# Patient Record
Sex: MALE | Race: WHITE | NOT HISPANIC OR LATINO | Employment: OTHER | URBAN - METROPOLITAN AREA
[De-identification: names, ages, dates, MRNs, and addresses within clinical notes are randomized per-mention and may not be internally consistent; named-entity substitution may affect disease eponyms.]

---

## 2023-03-02 LAB
ALANINE AMINOTRANSFERASE (SGPT) (U/L) IN SER/PLAS: 10 U/L (ref 10–52)
ALBUMIN (G/DL) IN SER/PLAS: 4.6 G/DL (ref 3.4–5)
ALKALINE PHOSPHATASE (U/L) IN SER/PLAS: 57 U/L (ref 33–136)
ANION GAP IN SER/PLAS: 14 MMOL/L (ref 10–20)
ASPARTATE AMINOTRANSFERASE (SGOT) (U/L) IN SER/PLAS: 11 U/L (ref 9–39)
BILIRUBIN TOTAL (MG/DL) IN SER/PLAS: 1.5 MG/DL (ref 0–1.2)
CALCIDIOL (25 OH VITAMIN D3) (NG/ML) IN SER/PLAS: 70 NG/ML
CALCIUM (MG/DL) IN SER/PLAS: 9 MG/DL (ref 8.6–10.3)
CARBON DIOXIDE, TOTAL (MMOL/L) IN SER/PLAS: 26 MMOL/L (ref 21–32)
CHLORIDE (MMOL/L) IN SER/PLAS: 103 MMOL/L (ref 98–107)
CHOLESTEROL (MG/DL) IN SER/PLAS: 63 MG/DL (ref 0–199)
CHOLESTEROL IN HDL (MG/DL) IN SER/PLAS: 32.3 MG/DL
CHOLESTEROL/HDL RATIO: 2
CREATININE (MG/DL) IN SER/PLAS: 1.58 MG/DL (ref 0.5–1.3)
CREATININE (MG/DL) IN URINE: 139 MG/DL (ref 20–370)
ERYTHROCYTE DISTRIBUTION WIDTH (RATIO) BY AUTOMATED COUNT: 21.6 % (ref 11.5–14.5)
ERYTHROCYTE MEAN CORPUSCULAR HEMOGLOBIN CONCENTRATION (G/DL) BY AUTOMATED: 32.1 G/DL (ref 32–36)
ERYTHROCYTE MEAN CORPUSCULAR VOLUME (FL) BY AUTOMATED COUNT: 114 FL (ref 80–100)
ERYTHROCYTES (10*6/UL) IN BLOOD BY AUTOMATED COUNT: 1.96 X10E12/L (ref 4.5–5.9)
ESTIMATED AVERAGE GLUCOSE FOR HBA1C: 134 MG/DL
GFR MALE: 44 ML/MIN/1.73M2
GLUCOSE (MG/DL) IN SER/PLAS: 135 MG/DL (ref 74–99)
HEMATOCRIT (%) IN BLOOD BY AUTOMATED COUNT: 22.4 % (ref 41–52)
HEMOGLOBIN (G/DL) IN BLOOD: 7.2 G/DL (ref 13.5–17.5)
HEMOGLOBIN A1C/HEMOGLOBIN TOTAL IN BLOOD: 6.3 %
LDL: 19 MG/DL (ref 0–99)
LEUKOCYTES (10*3/UL) IN BLOOD BY AUTOMATED COUNT: 4.8 X10E9/L (ref 4.4–11.3)
NRBC (PER 100 WBCS) BY AUTOMATED COUNT: 0.4 /100 WBC (ref 0–0)
PARATHYRIN INTACT (PG/ML) IN SER/PLAS: 48.1 PG/ML (ref 18.5–88)
PHOSPHATE (MG/DL) IN SER/PLAS: 3.9 MG/DL (ref 2.5–4.9)
PLATELETS (10*3/UL) IN BLOOD AUTOMATED COUNT: 183 X10E9/L (ref 150–450)
POTASSIUM (MMOL/L) IN SER/PLAS: 4.1 MMOL/L (ref 3.5–5.3)
PROTEIN (MG/DL) IN URINE: 46 MG/DL (ref 5–25)
PROTEIN TOTAL: 6.8 G/DL (ref 6.4–8.2)
PROTEIN/CREATININE (MG/MG) IN URINE: 0.33 MG/MG CREAT (ref 0–0.17)
SODIUM (MMOL/L) IN SER/PLAS: 139 MMOL/L (ref 136–145)
TRIGLYCERIDE (MG/DL) IN SER/PLAS: 60 MG/DL (ref 0–149)
UREA NITROGEN (MG/DL) IN SER/PLAS: 27 MG/DL (ref 6–23)
VLDL: 12 MG/DL (ref 0–40)

## 2023-03-03 LAB
ABO GROUP (TYPE) IN BLOOD: NORMAL
ABO GROUP (TYPE) IN BLOOD: NORMAL
ANTIBODY SCREEN: NORMAL
RH FACTOR: NORMAL
RH FACTOR: NORMAL

## 2023-05-15 LAB
ABO GROUP (TYPE) IN BLOOD: NORMAL
ANTIBODY SCREEN: NORMAL
RH FACTOR: NORMAL

## 2023-05-16 LAB
BB ANTIBODY IDENTIFICATION: NORMAL
PATH REV-IMMUNOHEMATOLOGY-PR30: NORMAL

## 2023-06-06 LAB — PROSTATE SPECIFIC AG (NG/ML) IN SER/PLAS: 1.21 NG/ML (ref 0–4)

## 2023-06-16 LAB
ABO GROUP (TYPE) IN BLOOD: NORMAL
ANTIBODY SCREEN: NORMAL
RH FACTOR: NORMAL

## 2023-06-19 LAB
BB ANTIBODY IDENTIFICATION: NORMAL
PATH REV-IMMUNOHEMATOLOGY-PR30: NORMAL

## 2023-06-30 LAB
ANION GAP IN SER/PLAS: 14 MMOL/L (ref 10–20)
CALCIDIOL (25 OH VITAMIN D3) (NG/ML) IN SER/PLAS: 54 NG/ML
CALCIUM (MG/DL) IN SER/PLAS: 9.2 MG/DL (ref 8.6–10.3)
CARBON DIOXIDE, TOTAL (MMOL/L) IN SER/PLAS: 25 MMOL/L (ref 21–32)
CHLORIDE (MMOL/L) IN SER/PLAS: 105 MMOL/L (ref 98–107)
CREATININE (MG/DL) IN SER/PLAS: 1.59 MG/DL (ref 0.5–1.3)
CREATININE (MG/DL) IN URINE: 115 MG/DL (ref 20–370)
GFR MALE: 44 ML/MIN/1.73M2
GLUCOSE (MG/DL) IN SER/PLAS: 134 MG/DL (ref 74–99)
PARATHYRIN INTACT (PG/ML) IN SER/PLAS: 35.8 PG/ML (ref 18.5–88)
PHOSPHATE (MG/DL) IN SER/PLAS: 4 MG/DL (ref 2.5–4.9)
POTASSIUM (MMOL/L) IN SER/PLAS: 4.1 MMOL/L (ref 3.5–5.3)
PROTEIN (MG/DL) IN URINE: 66 MG/DL (ref 5–25)
PROTEIN/CREATININE (MG/MG) IN URINE: 0.57 MG/MG CREAT (ref 0–0.17)
SODIUM (MMOL/L) IN SER/PLAS: 140 MMOL/L (ref 136–145)
UREA NITROGEN (MG/DL) IN SER/PLAS: 30 MG/DL (ref 6–23)

## 2023-07-07 LAB
COBALAMIN (VITAMIN B12) (PG/ML) IN SER/PLAS: 1786 PG/ML (ref 211–911)
ESTIMATED AVERAGE GLUCOSE FOR HBA1C: 137 MG/DL
HEMOGLOBIN A1C/HEMOGLOBIN TOTAL IN BLOOD: 6.4 %
THYROTROPIN (MIU/L) IN SER/PLAS BY DETECTION LIMIT <= 0.05 MIU/L: 2.78 MIU/L (ref 0.44–3.98)

## 2023-08-30 PROBLEM — N40.0 BENIGN PROSTATIC HYPERPLASIA: Status: ACTIVE | Noted: 2023-08-30

## 2023-08-30 PROBLEM — R09.89 CAROTID BRUIT: Status: ACTIVE | Noted: 2023-08-30

## 2023-08-30 PROBLEM — Z79.4 DIABETES MELLITUS TREATED WITH INSULIN AND ORAL MEDICATION (MULTI): Status: ACTIVE | Noted: 2023-08-30

## 2023-08-30 PROBLEM — E83.10 IRON DISORDER: Status: ACTIVE | Noted: 2023-08-30

## 2023-08-30 PROBLEM — I25.10 CORONARY ARTERY DISEASE INVOLVING NATIVE CORONARY ARTERY OF NATIVE HEART: Status: ACTIVE | Noted: 2023-08-30

## 2023-08-30 PROBLEM — I65.23 STENOSIS OF BOTH EXTERNAL CAROTID ARTERIES: Status: ACTIVE | Noted: 2023-08-30

## 2023-08-30 PROBLEM — K21.9 GERD (GASTROESOPHAGEAL REFLUX DISEASE): Status: ACTIVE | Noted: 2023-08-30

## 2023-08-30 PROBLEM — Z95.1 S/P CABG X 5: Status: ACTIVE | Noted: 2023-08-30

## 2023-08-30 PROBLEM — I20.9 ANGINA, CLASS II (CMS-HCC): Status: ACTIVE | Noted: 2023-08-30

## 2023-08-30 PROBLEM — C44.320 SQUAMOUS CELL CARCINOMA OF SKIN OF FACE: Status: ACTIVE | Noted: 2023-08-30

## 2023-08-30 PROBLEM — R94.30 ABNORMAL RESULTS OF CARDIOVASCULAR FUNCTION STUDIES: Status: ACTIVE | Noted: 2023-08-30

## 2023-08-30 PROBLEM — K76.0 FATTY LIVER: Status: ACTIVE | Noted: 2023-08-30

## 2023-08-30 PROBLEM — I10 ESSENTIAL HYPERTENSION: Status: ACTIVE | Noted: 2023-08-30

## 2023-08-30 PROBLEM — D64.9 CHRONIC ANEMIA: Status: ACTIVE | Noted: 2023-08-30

## 2023-08-30 PROBLEM — E66.3 OVERWEIGHT WITH BODY MASS INDEX (BMI) OF 27 TO 27.9 IN ADULT: Status: ACTIVE | Noted: 2023-08-30

## 2023-08-30 PROBLEM — I73.9 PVD (PERIPHERAL VASCULAR DISEASE) (CMS-HCC): Status: ACTIVE | Noted: 2023-08-30

## 2023-08-30 PROBLEM — E78.2 MIXED HYPERLIPIDEMIA: Status: ACTIVE | Noted: 2023-08-30

## 2023-08-30 PROBLEM — N18.30 CKD (CHRONIC KIDNEY DISEASE), STAGE III (MULTI): Status: ACTIVE | Noted: 2023-08-30

## 2023-08-30 PROBLEM — E55.9 VITAMIN D DEFICIENCY: Status: ACTIVE | Noted: 2023-08-30

## 2023-08-30 PROBLEM — Z87.891 FORMER SMOKER: Status: ACTIVE | Noted: 2023-08-30

## 2023-08-30 PROBLEM — E11.9 DIABETES MELLITUS TREATED WITH INSULIN AND ORAL MEDICATION (MULTI): Status: ACTIVE | Noted: 2023-08-30

## 2023-08-30 PROBLEM — Z79.84 DIABETES MELLITUS TREATED WITH INSULIN AND ORAL MEDICATION (MULTI): Status: ACTIVE | Noted: 2023-08-30

## 2023-08-30 RX ORDER — ASPIRIN 81 MG/1
1 TABLET ORAL DAILY
COMMUNITY

## 2023-08-30 RX ORDER — TAMSULOSIN HYDROCHLORIDE 0.4 MG/1
1 CAPSULE ORAL DAILY
COMMUNITY

## 2023-08-30 RX ORDER — METFORMIN HYDROCHLORIDE 1000 MG/1
1000 TABLET ORAL 2 TIMES DAILY
COMMUNITY
End: 2023-11-10 | Stop reason: ALTCHOICE

## 2023-08-30 RX ORDER — LUSPATERCEPT 25 MG/1
INJECTION, POWDER, LYOPHILIZED, FOR SOLUTION SUBCUTANEOUS
COMMUNITY
End: 2024-01-25 | Stop reason: WASHOUT

## 2023-08-30 RX ORDER — CYANOCOBALAMIN (VITAMIN B-12) 500 MCG
1 TABLET ORAL NIGHTLY
COMMUNITY

## 2023-08-30 RX ORDER — LANOLIN ALCOHOL/MO/W.PET/CERES
2 CREAM (GRAM) TOPICAL DAILY
COMMUNITY

## 2023-08-30 RX ORDER — GLIMEPIRIDE 4 MG/1
4 TABLET ORAL 2 TIMES DAILY
COMMUNITY
End: 2023-11-30 | Stop reason: SDUPTHER

## 2023-08-30 RX ORDER — ISOSORBIDE MONONITRATE 30 MG/1
1 TABLET, EXTENDED RELEASE ORAL DAILY
COMMUNITY
End: 2023-10-17 | Stop reason: SDUPTHER

## 2023-08-30 RX ORDER — NITROGLYCERIN 0.4 MG/1
0.4 TABLET SUBLINGUAL EVERY 5 MIN PRN
COMMUNITY
End: 2023-11-10 | Stop reason: SDUPTHER

## 2023-08-30 RX ORDER — DAPAGLIFLOZIN 10 MG/1
1 TABLET, FILM COATED ORAL EVERY MORNING
COMMUNITY
Start: 2022-09-28 | End: 2023-10-03

## 2023-08-30 RX ORDER — SIMVASTATIN 20 MG/1
20 TABLET, FILM COATED ORAL NIGHTLY
COMMUNITY
End: 2023-10-17 | Stop reason: SDUPTHER

## 2023-08-30 RX ORDER — PEN NEEDLE, DIABETIC 29 G X1/2"
1 NEEDLE, DISPOSABLE MISCELLANEOUS DAILY
COMMUNITY
End: 2023-10-10 | Stop reason: ALTCHOICE

## 2023-08-30 RX ORDER — DILTIAZEM HYDROCHLORIDE 240 MG/1
1 CAPSULE, EXTENDED RELEASE ORAL DAILY
COMMUNITY
End: 2023-10-10 | Stop reason: ALTCHOICE

## 2023-08-30 RX ORDER — INSULIN DETEMIR 100 [IU]/ML
15 INJECTION, SOLUTION SUBCUTANEOUS NIGHTLY
COMMUNITY
End: 2024-02-29 | Stop reason: SDUPTHER

## 2023-08-30 RX ORDER — HYDROCHLOROTHIAZIDE 25 MG/1
1 TABLET ORAL DAILY
COMMUNITY
End: 2023-10-27

## 2023-08-30 RX ORDER — ERGOCALCIFEROL 1.25 MG/1
1 CAPSULE ORAL
COMMUNITY
Start: 2022-09-14 | End: 2024-03-27 | Stop reason: WASHOUT

## 2023-08-30 RX ORDER — PANTOPRAZOLE SODIUM 40 MG/1
1 TABLET, DELAYED RELEASE ORAL DAILY
COMMUNITY
End: 2024-01-30 | Stop reason: SDUPTHER

## 2023-08-30 RX ORDER — CLOPIDOGREL BISULFATE 75 MG/1
1 TABLET ORAL DAILY
COMMUNITY
Start: 2022-05-12 | End: 2023-11-30 | Stop reason: SDUPTHER

## 2023-08-30 RX ORDER — LISINOPRIL 30 MG/1
1 TABLET ORAL DAILY
COMMUNITY
End: 2023-11-30 | Stop reason: SDUPTHER

## 2023-09-21 VITALS — HEIGHT: 66 IN | BODY MASS INDEX: 27.32 KG/M2 | WEIGHT: 169.97 LBS

## 2023-09-21 DIAGNOSIS — D46.Z MDS (MYELODYSPLASTIC SYNDROME), LOW GRADE (MULTI): Primary | ICD-10-CM

## 2023-09-21 DIAGNOSIS — D46.1 REFRACTORY ANEMIA WITH RING SIDEROBLASTS (MULTI): ICD-10-CM

## 2023-09-21 RX ORDER — FAMOTIDINE 10 MG/ML
20 INJECTION INTRAVENOUS ONCE AS NEEDED
Status: CANCELLED | OUTPATIENT
Start: 2023-10-02

## 2023-09-21 RX ORDER — HEPARIN SODIUM,PORCINE/PF 10 UNIT/ML
50 SYRINGE (ML) INTRAVENOUS AS NEEDED
Status: CANCELLED | OUTPATIENT
Start: 2023-09-30

## 2023-09-21 RX ORDER — HEPARIN 100 UNIT/ML
500 SYRINGE INTRAVENOUS AS NEEDED
Status: CANCELLED | OUTPATIENT
Start: 2023-09-30

## 2023-09-21 RX ORDER — DIPHENHYDRAMINE HYDROCHLORIDE 50 MG/ML
50 INJECTION INTRAMUSCULAR; INTRAVENOUS AS NEEDED
Status: CANCELLED | OUTPATIENT
Start: 2023-10-02

## 2023-09-21 RX ORDER — EPINEPHRINE 0.3 MG/.3ML
0.3 INJECTION SUBCUTANEOUS EVERY 5 MIN PRN
Status: CANCELLED | OUTPATIENT
Start: 2023-10-02

## 2023-09-21 RX ORDER — ALBUTEROL SULFATE 0.83 MG/ML
3 SOLUTION RESPIRATORY (INHALATION) AS NEEDED
Status: CANCELLED | OUTPATIENT
Start: 2023-10-02

## 2023-09-24 DIAGNOSIS — D46.1 REFRACTORY ANEMIA WITH RING SIDEROBLASTS (MULTI): ICD-10-CM

## 2023-09-24 DIAGNOSIS — D46.Z MDS (MYELODYSPLASTIC SYNDROME), LOW GRADE (MULTI): Primary | ICD-10-CM

## 2023-10-02 ENCOUNTER — OFFICE VISIT (OUTPATIENT)
Dept: HEMATOLOGY/ONCOLOGY | Facility: CLINIC | Age: 78
End: 2023-10-02
Payer: MEDICARE

## 2023-10-02 ENCOUNTER — LAB (OUTPATIENT)
Dept: LAB | Facility: CLINIC | Age: 78
End: 2023-10-02
Payer: MEDICARE

## 2023-10-02 ENCOUNTER — INFUSION (OUTPATIENT)
Dept: HEMATOLOGY/ONCOLOGY | Facility: CLINIC | Age: 78
End: 2023-10-02
Payer: MEDICARE

## 2023-10-02 VITALS
SYSTOLIC BLOOD PRESSURE: 123 MMHG | RESPIRATION RATE: 18 BRPM | TEMPERATURE: 97.3 F | WEIGHT: 168.43 LBS | OXYGEN SATURATION: 96 % | HEIGHT: 66 IN | DIASTOLIC BLOOD PRESSURE: 57 MMHG | BODY MASS INDEX: 27.07 KG/M2 | HEART RATE: 86 BPM

## 2023-10-02 DIAGNOSIS — Z79.4 DIABETES MELLITUS TREATED WITH INSULIN AND ORAL MEDICATION (MULTI): ICD-10-CM

## 2023-10-02 DIAGNOSIS — E83.110 HEREDITARY HEMOCHROMATOSIS (CMS-HCC): ICD-10-CM

## 2023-10-02 DIAGNOSIS — D46.1 REFRACTORY ANEMIA WITH RING SIDEROBLASTS (MULTI): ICD-10-CM

## 2023-10-02 DIAGNOSIS — I10 ESSENTIAL HYPERTENSION: ICD-10-CM

## 2023-10-02 DIAGNOSIS — D46.Z MDS (MYELODYSPLASTIC SYNDROME), LOW GRADE (MULTI): ICD-10-CM

## 2023-10-02 DIAGNOSIS — Z79.84 DIABETES MELLITUS TREATED WITH INSULIN AND ORAL MEDICATION (MULTI): ICD-10-CM

## 2023-10-02 DIAGNOSIS — I20.9 ANGINA, CLASS II (CMS-HCC): ICD-10-CM

## 2023-10-02 DIAGNOSIS — E78.2 MIXED HYPERLIPIDEMIA: ICD-10-CM

## 2023-10-02 DIAGNOSIS — D46.Z MDS (MYELODYSPLASTIC SYNDROME), LOW GRADE (MULTI): Primary | ICD-10-CM

## 2023-10-02 DIAGNOSIS — I73.9 PVD (PERIPHERAL VASCULAR DISEASE) (CMS-HCC): ICD-10-CM

## 2023-10-02 DIAGNOSIS — E11.9 DIABETES MELLITUS TREATED WITH INSULIN AND ORAL MEDICATION (MULTI): ICD-10-CM

## 2023-10-02 DIAGNOSIS — N18.31 STAGE 3A CHRONIC KIDNEY DISEASE (MULTI): ICD-10-CM

## 2023-10-02 LAB
ALBUMIN SERPL BCP-MCNC: 4.6 G/DL (ref 3.4–5)
ALP SERPL-CCNC: 74 U/L (ref 33–136)
ALT SERPL W P-5'-P-CCNC: 10 U/L (ref 10–52)
ANION GAP SERPL CALC-SCNC: 17 MMOL/L (ref 10–20)
AST SERPL W P-5'-P-CCNC: 11 U/L (ref 9–39)
BASOPHILS # BLD AUTO: 0.06 X10*3/UL (ref 0–0.1)
BASOPHILS NFR BLD AUTO: 1.1 %
BILIRUB SERPL-MCNC: 1.4 MG/DL (ref 0–1.2)
BUN SERPL-MCNC: 28 MG/DL (ref 6–23)
CALCIUM SERPL-MCNC: 9.5 MG/DL (ref 8.6–10.3)
CHLORIDE SERPL-SCNC: 102 MMOL/L (ref 98–107)
CO2 SERPL-SCNC: 24 MMOL/L (ref 21–32)
CREAT SERPL-MCNC: 1.6 MG/DL (ref 0.5–1.3)
DACRYOCYTES BLD QL SMEAR: NORMAL
EOSINOPHIL # BLD AUTO: 0.51 X10*3/UL (ref 0–0.4)
EOSINOPHIL NFR BLD AUTO: 9.5 %
ERYTHROCYTE [DISTWIDTH] IN BLOOD BY AUTOMATED COUNT: 21.5 % (ref 11.5–14.5)
GFR SERPL CREATININE-BSD FRML MDRD: 44 ML/MIN/1.73M*2
GLUCOSE SERPL-MCNC: 184 MG/DL (ref 74–99)
HCT VFR BLD AUTO: 25.7 % (ref 41–52)
HGB BLD-MCNC: 8.6 G/DL (ref 13.5–17.5)
IMM GRANULOCYTES # BLD AUTO: 0.02 X10*3/UL (ref 0–0.5)
IMM GRANULOCYTES NFR BLD AUTO: 0.4 % (ref 0–0.9)
LYMPHOCYTES # BLD AUTO: 1.74 X10*3/UL (ref 0.8–3)
LYMPHOCYTES NFR BLD AUTO: 32.3 %
MCH RBC QN AUTO: 37.6 PG (ref 26–34)
MCHC RBC AUTO-ENTMCNC: 33.5 G/DL (ref 32–36)
MCV RBC AUTO: 112 FL (ref 80–100)
MONOCYTES # BLD AUTO: 0.42 X10*3/UL (ref 0.05–0.8)
MONOCYTES NFR BLD AUTO: 7.8 %
NEUTROPHILS # BLD AUTO: 2.64 X10*3/UL (ref 1.6–5.5)
NEUTROPHILS NFR BLD AUTO: 48.9 %
NRBC BLD-RTO: ABNORMAL /100{WBCS}
OVALOCYTES BLD QL SMEAR: NORMAL
PLATELET # BLD AUTO: 218 X10*3/UL (ref 150–450)
PMV BLD AUTO: 14.5 FL (ref 7.5–11.5)
POLYCHROMASIA BLD QL SMEAR: NORMAL
POTASSIUM SERPL-SCNC: 4.1 MMOL/L (ref 3.5–5.3)
PROT SERPL-MCNC: 6.9 G/DL (ref 6.4–8.2)
RBC # BLD AUTO: 2.29 X10*6/UL (ref 4.5–5.9)
RBC MORPH BLD: NORMAL
SCHISTOCYTES BLD QL SMEAR: NORMAL
SODIUM SERPL-SCNC: 139 MMOL/L (ref 136–145)
WBC # BLD AUTO: 5.4 X10*3/UL (ref 4.4–11.3)

## 2023-10-02 PROCEDURE — 1159F MED LIST DOCD IN RCRD: CPT | Performed by: INTERNAL MEDICINE

## 2023-10-02 PROCEDURE — 1125F AMNT PAIN NOTED PAIN PRSNT: CPT | Performed by: INTERNAL MEDICINE

## 2023-10-02 PROCEDURE — 99214 OFFICE O/P EST MOD 30 MIN: CPT | Performed by: INTERNAL MEDICINE

## 2023-10-02 PROCEDURE — 3078F DIAST BP <80 MM HG: CPT | Performed by: INTERNAL MEDICINE

## 2023-10-02 PROCEDURE — 1126F AMNT PAIN NOTED NONE PRSNT: CPT | Performed by: INTERNAL MEDICINE

## 2023-10-02 PROCEDURE — 85025 COMPLETE CBC W/AUTO DIFF WBC: CPT

## 2023-10-02 PROCEDURE — 2500000004 HC RX 250 GENERAL PHARMACY W/ HCPCS (ALT 636 FOR OP/ED): Mod: JW | Performed by: INTERNAL MEDICINE

## 2023-10-02 PROCEDURE — 80053 COMPREHEN METABOLIC PANEL: CPT

## 2023-10-02 PROCEDURE — 36415 COLL VENOUS BLD VENIPUNCTURE: CPT

## 2023-10-02 PROCEDURE — 85060 BLOOD SMEAR INTERPRETATION: CPT | Performed by: INTERNAL MEDICINE

## 2023-10-02 PROCEDURE — 3074F SYST BP LT 130 MM HG: CPT | Performed by: INTERNAL MEDICINE

## 2023-10-02 PROCEDURE — 1036F TOBACCO NON-USER: CPT | Performed by: INTERNAL MEDICINE

## 2023-10-02 PROCEDURE — 1160F RVW MEDS BY RX/DR IN RCRD: CPT | Performed by: INTERNAL MEDICINE

## 2023-10-02 RX ORDER — EPINEPHRINE 0.3 MG/.3ML
0.3 INJECTION SUBCUTANEOUS EVERY 5 MIN PRN
Status: CANCELLED | OUTPATIENT
Start: 2023-10-23

## 2023-10-02 RX ORDER — ALBUTEROL SULFATE 0.83 MG/ML
3 SOLUTION RESPIRATORY (INHALATION) AS NEEDED
Status: CANCELLED | OUTPATIENT
Start: 2023-10-23

## 2023-10-02 RX ORDER — DIPHENHYDRAMINE HYDROCHLORIDE 50 MG/ML
50 INJECTION INTRAMUSCULAR; INTRAVENOUS AS NEEDED
Status: DISCONTINUED | OUTPATIENT
Start: 2023-10-02 | End: 2023-10-02 | Stop reason: HOSPADM

## 2023-10-02 RX ORDER — FAMOTIDINE 10 MG/ML
20 INJECTION INTRAVENOUS ONCE AS NEEDED
Status: DISCONTINUED | OUTPATIENT
Start: 2023-10-02 | End: 2023-10-02 | Stop reason: HOSPADM

## 2023-10-02 RX ORDER — DIPHENHYDRAMINE HYDROCHLORIDE 50 MG/ML
50 INJECTION INTRAMUSCULAR; INTRAVENOUS AS NEEDED
Status: CANCELLED | OUTPATIENT
Start: 2023-10-23

## 2023-10-02 RX ORDER — EPINEPHRINE 0.3 MG/.3ML
0.3 INJECTION SUBCUTANEOUS EVERY 5 MIN PRN
Status: DISCONTINUED | OUTPATIENT
Start: 2023-10-02 | End: 2023-10-02 | Stop reason: HOSPADM

## 2023-10-02 RX ORDER — FAMOTIDINE 10 MG/ML
20 INJECTION INTRAVENOUS ONCE AS NEEDED
Status: CANCELLED | OUTPATIENT
Start: 2023-10-23

## 2023-10-02 RX ORDER — ALBUTEROL SULFATE 0.83 MG/ML
3 SOLUTION RESPIRATORY (INHALATION) AS NEEDED
Status: DISCONTINUED | OUTPATIENT
Start: 2023-10-02 | End: 2023-10-02 | Stop reason: HOSPADM

## 2023-10-02 RX ADMIN — LUSPATERCEPT 102.5 MG: 25 INJECTION, POWDER, LYOPHILIZED, FOR SOLUTION SUBCUTANEOUS at 15:10

## 2023-10-02 ASSESSMENT — PROMIS GLOBAL HEALTH SCALE
IN THE PAST 7 DAYS, HOW WOULD YOU RATE YOUR FATIGUE ON AVERAGE [ON A SCALE FROM 1 (NONE) TO 5 (VERY SEVERE)]?: MODERATE
IN GENERAL, PLEASE RATE HOW WELL YOU CARRY OUT YOUR USUAL SOCIAL ACTIVITIES (INCLUDES ACTIVITIES AT HOME, AT WORK, AND IN YOUR COMMUNITY, AND RESPONSIBILITIES AS A PARENT, CHILD, SPOUSE, EMPLOYEE, FRIEND, ETC) [ON A SCALE OF 1 (POOR) TO 5 (EXCELLENT)]?: FAIR
IN THE PAST 7 DAYS, HOW OFTEN HAVE YOU BEEN BOTHERED BY EMOTIONAL PROBLEMS, SUCH AS FEELING ANXIOUS, DEPRESSED, OR IRRITABLE [ON A SCALE FROM 1 (NEVER) TO 5 (ALWAYS)]?: RARELY
IN GENERAL, HOW WOULD YOU RATE YOUR SATISFACTION WITH YOUR SOCIAL ACTIVITIES AND RELATIONSHIPS [ON A SCALE OF 1 (POOR) TO 5 (EXCELLENT)]?: GOOD
TO WHAT EXTENT ARE YOU ABLE TO CARRY OUT YOUR EVERYDAY PHYSICAL ACTIVITIES SUCH AS WALKING, CLIMBING STAIRS, CARRYING GROCERIES, OR MOVING A CHAIR [ON A SCALE OF 1 (NOT AT ALL) TO 5 (COMPLETELY)]?: MODERATELY
IN GENERAL, WOULD YOU SAY YOUR QUALITY OF LIFE IS...[ON A SCALE OF 1 (POOR) TO 5 (EXCELLENT)]: VERY GOOD
IN GENERAL, HOW WOULD YOU RATE YOUR PHYSICAL HEALTH [ON A SCALE OF 1 (POOR) TO 5 (EXCELLENT)]?: FAIR
IN THE PAST 7 DAYS, HOW WOULD YOU RATE YOUR PAIN ON AVERAGE [ON A SCALE FROM 0 (NO PAIN) TO 10 (WORST IMAGINABLE PAIN)]?: 2
IN GENERAL, HOW WOULD YOU RATE YOUR MENTAL HEALTH, INCLUDING YOUR MOOD AND YOUR ABILITY TO THINK [ON A SCALE OF 1 (POOR) TO 5 (EXCELLENT)]?: EXCELLENT

## 2023-10-02 ASSESSMENT — ENCOUNTER SYMPTOMS
LOSS OF SENSATION IN FEET: 0
DEPRESSION: 0
OCCASIONAL FEELINGS OF UNSTEADINESS: 0

## 2023-10-02 ASSESSMENT — PAIN SCALES - GENERAL: PAINLEVEL: 0-NO PAIN

## 2023-10-02 NOTE — PROGRESS NOTES
"Patient ID: Chas Melgar is a 78 y.o. male.    Subjective    HPI How are my blood counts?    Here for next dose of luspatercept.  Says his exercise tolerance over the last couple weeks has gotten a bit better.        Objective    BSA: 1.88 meters squared  /57   Pulse 86   Temp 36.3 °C (97.3 °F)   Resp 18   Ht 1.667 m (5' 5.63\")   Wt 76.4 kg (168 lb 6.9 oz)   SpO2 96%   BMI 27.49 kg/m²      Physical Exam  Vitals reviewed.   Constitutional:       Appearance: Normal appearance.   HENT:      Head: Normocephalic.   Eyes:      Extraocular Movements: Extraocular movements intact.      Pupils: Pupils are equal, round, and reactive to light.   Cardiovascular:      Rate and Rhythm: Normal rate and regular rhythm.      Heart sounds: Normal heart sounds.   Abdominal:      General: Bowel sounds are normal.      Palpations: Abdomen is soft.   Musculoskeletal:         General: Normal range of motion.      Cervical back: Normal range of motion and neck supple.   Skin:     General: Skin is warm.   Neurological:      General: No focal deficit present.      Mental Status: He is alert and oriented to person, place, and time.   Psychiatric:         Mood and Affect: Mood normal.     Constitutional: positive for fatigue   Eyes: negative  Ears, nose, mouth, throat, and face: negative  Respiratory: negative  Cardiovascular: negative  Integument/breasts: negative  Gastrointestinal: negative  Genitourinary:negative  Musculoskeletal: legs feel weak  Neurological: negative  Behavioral/Psych: negative  Hematologic/lymphatic: negative  Endocrine: negative  Allergic/Immunologic: negative    Performance Status:  Symptomatic; fully ambulatory      Assessment/Plan    1) MDS/RARS  -has symptomatic anemia  -failed aranesp  -currently on luspatercept Q3 weeks  -currently being dose at 1.33 mg/kg  -reports exercise tolerance a bit better over the last couple weeks  -labs to be done today include CBC, COMP  -results reviewed--wbc 5.4, " hgb 8.6, plt 218,000, creatinine 1.6  -benefits, risks, potential morbidity related to luspatercept were reviewed with Chas and he provided informed consent to prceed  -he went on to receive luspatercept 1.33 mg/kg subcutaneous  -he will return in 3 weeks for next dose    2) hereditary hemochromatosis  -had been on therapeutic phlebotomy in the past, however, given current marked anemia, it has not been wise to continue with phlebotomy    3) hyperlipidemia  -on simvastatin    4) hypertension  -on hydrochlorothiazide  -on lisinopril  -on Cartia XT    5) diabetes  -on glimepiride  -on metformin  -on levemir    6) PAD  -on ASA  -s/p multiple stents    Cancer Staging   No matching staging information was found for the patient.    Oncology History    No history exists.        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

## 2023-10-03 LAB — PATH REVIEW-CBC DIFFERENTIAL: NORMAL

## 2023-10-10 RX ORDER — PEN NEEDLE, DIABETIC 31 GX5/16"
NEEDLE, DISPOSABLE MISCELLANEOUS
COMMUNITY
Start: 2023-08-31 | End: 2023-12-20 | Stop reason: SDUPTHER

## 2023-10-10 RX ORDER — DILTIAZEM HYDROCHLORIDE 240 MG/1
240 CAPSULE, COATED, EXTENDED RELEASE ORAL DAILY
COMMUNITY
Start: 2023-09-16 | End: 2023-12-21

## 2023-10-17 ENCOUNTER — DOCUMENTATION (OUTPATIENT)
Dept: CARDIOLOGY | Facility: CLINIC | Age: 78
End: 2023-10-17
Payer: MEDICARE

## 2023-10-17 DIAGNOSIS — I25.10 CORONARY ARTERY DISEASE INVOLVING NATIVE CORONARY ARTERY OF NATIVE HEART, UNSPECIFIED WHETHER ANGINA PRESENT: ICD-10-CM

## 2023-10-17 DIAGNOSIS — Z95.1 S/P CABG X 5: ICD-10-CM

## 2023-10-17 DIAGNOSIS — E78.2 MIXED HYPERLIPIDEMIA: ICD-10-CM

## 2023-10-17 DIAGNOSIS — I20.9 ANGINA, CLASS II (CMS-HCC): ICD-10-CM

## 2023-10-17 NOTE — PROGRESS NOTES
10/17/23  Recvd vm from [facility] asking for refills of pt's Imdur 30mg every day, and Simvastatin 20mg every day, from Dr. Mcmullen.  This pt. Follows with Dr. Goodwin.  Sent pending auth.  Alfonso

## 2023-10-18 RX ORDER — SIMVASTATIN 20 MG/1
20 TABLET, FILM COATED ORAL NIGHTLY
Qty: 90 TABLET | Refills: 3 | Status: SHIPPED | OUTPATIENT
Start: 2023-10-18 | End: 2024-10-17

## 2023-10-18 RX ORDER — ISOSORBIDE MONONITRATE 30 MG/1
30 TABLET, EXTENDED RELEASE ORAL DAILY
Qty: 90 TABLET | Refills: 3 | Status: SHIPPED | OUTPATIENT
Start: 2023-10-18 | End: 2024-10-17

## 2023-10-23 ENCOUNTER — APPOINTMENT (OUTPATIENT)
Dept: LAB | Facility: CLINIC | Age: 78
End: 2023-10-23
Payer: MEDICARE

## 2023-10-23 ENCOUNTER — INFUSION (OUTPATIENT)
Dept: HEMATOLOGY/ONCOLOGY | Facility: CLINIC | Age: 78
End: 2023-10-23
Payer: MEDICARE

## 2023-10-23 VITALS
DIASTOLIC BLOOD PRESSURE: 68 MMHG | OXYGEN SATURATION: 99 % | TEMPERATURE: 97.3 F | WEIGHT: 169.31 LBS | BODY MASS INDEX: 27.64 KG/M2 | SYSTOLIC BLOOD PRESSURE: 142 MMHG | RESPIRATION RATE: 20 BRPM

## 2023-10-23 DIAGNOSIS — D46.1 REFRACTORY ANEMIA WITH RING SIDEROBLASTS (MULTI): ICD-10-CM

## 2023-10-23 DIAGNOSIS — D46.Z MDS (MYELODYSPLASTIC SYNDROME), LOW GRADE (MULTI): ICD-10-CM

## 2023-10-23 LAB
ALBUMIN SERPL BCP-MCNC: 4.3 G/DL (ref 3.4–5)
ALP SERPL-CCNC: 78 U/L (ref 33–136)
ALT SERPL W P-5'-P-CCNC: 10 U/L (ref 10–52)
ANION GAP SERPL CALC-SCNC: 17 MMOL/L (ref 10–20)
AST SERPL W P-5'-P-CCNC: 9 U/L (ref 9–39)
BASO STIPL BLD QL SMEAR: PRESENT
BASOPHILS # BLD AUTO: 0.06 X10*3/UL (ref 0–0.1)
BASOPHILS NFR BLD AUTO: 1.2 %
BILIRUB SERPL-MCNC: 1.3 MG/DL (ref 0–1.2)
BUN SERPL-MCNC: 32 MG/DL (ref 6–23)
CALCIUM SERPL-MCNC: 9.3 MG/DL (ref 8.6–10.3)
CHLORIDE SERPL-SCNC: 103 MMOL/L (ref 98–107)
CO2 SERPL-SCNC: 24 MMOL/L (ref 21–32)
CREAT SERPL-MCNC: 1.65 MG/DL (ref 0.5–1.3)
DACRYOCYTES BLD QL SMEAR: NORMAL
EOSINOPHIL # BLD AUTO: 0.45 X10*3/UL (ref 0–0.4)
EOSINOPHIL NFR BLD AUTO: 8.8 %
ERYTHROCYTE [DISTWIDTH] IN BLOOD BY AUTOMATED COUNT: 22.3 % (ref 11.5–14.5)
GFR SERPL CREATININE-BSD FRML MDRD: 42 ML/MIN/1.73M*2
GLUCOSE SERPL-MCNC: 302 MG/DL (ref 74–99)
HCT VFR BLD AUTO: 25.7 % (ref 41–52)
HGB BLD-MCNC: 8.6 G/DL (ref 13.5–17.5)
HYPOCHROMIA BLD QL SMEAR: NORMAL
IMM GRANULOCYTES # BLD AUTO: 0.01 X10*3/UL (ref 0–0.5)
IMM GRANULOCYTES NFR BLD AUTO: 0.2 % (ref 0–0.9)
LYMPHOCYTES # BLD AUTO: 1.38 X10*3/UL (ref 0.8–3)
LYMPHOCYTES NFR BLD AUTO: 27 %
MCH RBC QN AUTO: 37.4 PG (ref 26–34)
MCHC RBC AUTO-ENTMCNC: 33.5 G/DL (ref 32–36)
MCV RBC AUTO: 112 FL (ref 80–100)
MONOCYTES # BLD AUTO: 0.46 X10*3/UL (ref 0.05–0.8)
MONOCYTES NFR BLD AUTO: 9 %
NEUTROPHILS # BLD AUTO: 2.75 X10*3/UL (ref 1.6–5.5)
NEUTROPHILS NFR BLD AUTO: 53.8 %
NRBC BLD-RTO: ABNORMAL /100{WBCS}
OVALOCYTES BLD QL SMEAR: NORMAL
PLATELET # BLD AUTO: 155 X10*3/UL (ref 150–450)
PMV BLD AUTO: 14.6 FL (ref 7.5–11.5)
POLYCHROMASIA BLD QL SMEAR: NORMAL
POTASSIUM SERPL-SCNC: 4.7 MMOL/L (ref 3.5–5.3)
PROT SERPL-MCNC: 6.4 G/DL (ref 6.4–8.2)
RBC # BLD AUTO: 2.3 X10*6/UL (ref 4.5–5.9)
RBC MORPH BLD: NORMAL
SCHISTOCYTES BLD QL SMEAR: NORMAL
SODIUM SERPL-SCNC: 139 MMOL/L (ref 136–145)
TARGETS BLD QL SMEAR: NORMAL
WBC # BLD AUTO: 5.1 X10*3/UL (ref 4.4–11.3)

## 2023-10-23 PROCEDURE — 36415 COLL VENOUS BLD VENIPUNCTURE: CPT

## 2023-10-23 PROCEDURE — 80053 COMPREHEN METABOLIC PANEL: CPT

## 2023-10-23 PROCEDURE — 2500000004 HC RX 250 GENERAL PHARMACY W/ HCPCS (ALT 636 FOR OP/ED): Performed by: INTERNAL MEDICINE

## 2023-10-23 PROCEDURE — 96372 THER/PROPH/DIAG INJ SC/IM: CPT

## 2023-10-23 PROCEDURE — 85025 COMPLETE CBC W/AUTO DIFF WBC: CPT

## 2023-10-23 RX ORDER — ALBUTEROL SULFATE 0.83 MG/ML
3 SOLUTION RESPIRATORY (INHALATION) AS NEEDED
Status: CANCELLED | OUTPATIENT
Start: 2023-11-13

## 2023-10-23 RX ORDER — DIPHENHYDRAMINE HYDROCHLORIDE 50 MG/ML
50 INJECTION INTRAMUSCULAR; INTRAVENOUS AS NEEDED
Status: CANCELLED | OUTPATIENT
Start: 2023-11-13

## 2023-10-23 RX ORDER — EPINEPHRINE 0.3 MG/.3ML
0.3 INJECTION SUBCUTANEOUS EVERY 5 MIN PRN
Status: CANCELLED | OUTPATIENT
Start: 2023-11-13

## 2023-10-23 RX ORDER — FAMOTIDINE 10 MG/ML
20 INJECTION INTRAVENOUS ONCE AS NEEDED
Status: CANCELLED | OUTPATIENT
Start: 2023-11-13

## 2023-10-23 RX ADMIN — LUSPATERCEPT 92.5 MG: 75 INJECTION, POWDER, LYOPHILIZED, FOR SOLUTION SUBCUTANEOUS at 14:14

## 2023-10-23 ASSESSMENT — PAIN SCALES - GENERAL: PAINLEVEL: 4

## 2023-10-27 DIAGNOSIS — I10 ESSENTIAL HYPERTENSION: Primary | ICD-10-CM

## 2023-10-27 RX ORDER — HYDROCHLOROTHIAZIDE 25 MG/1
25 TABLET ORAL DAILY
Qty: 90 TABLET | Refills: 0 | Status: SHIPPED | OUTPATIENT
Start: 2023-10-27 | End: 2024-01-30 | Stop reason: SDUPTHER

## 2023-11-10 ENCOUNTER — OFFICE VISIT (OUTPATIENT)
Dept: PRIMARY CARE | Facility: CLINIC | Age: 78
End: 2023-11-10
Payer: MEDICARE

## 2023-11-10 VITALS
DIASTOLIC BLOOD PRESSURE: 57 MMHG | BODY MASS INDEX: 27.26 KG/M2 | HEIGHT: 66 IN | SYSTOLIC BLOOD PRESSURE: 116 MMHG | WEIGHT: 169.6 LBS | HEART RATE: 82 BPM

## 2023-11-10 DIAGNOSIS — Z79.84 DIABETES MELLITUS TREATED WITH INSULIN AND ORAL MEDICATION (MULTI): ICD-10-CM

## 2023-11-10 DIAGNOSIS — E11.9 DIABETES MELLITUS TREATED WITH INSULIN AND ORAL MEDICATION (MULTI): ICD-10-CM

## 2023-11-10 DIAGNOSIS — R53.1 GENERALIZED WEAKNESS: ICD-10-CM

## 2023-11-10 DIAGNOSIS — I10 PRIMARY HYPERTENSION: Primary | ICD-10-CM

## 2023-11-10 DIAGNOSIS — Z79.4 DIABETES MELLITUS TREATED WITH INSULIN AND ORAL MEDICATION (MULTI): ICD-10-CM

## 2023-11-10 DIAGNOSIS — I20.9 ANGINA, CLASS II (CMS-HCC): ICD-10-CM

## 2023-11-10 DIAGNOSIS — Z23 NEED FOR INFLUENZA VACCINATION: ICD-10-CM

## 2023-11-10 PROCEDURE — 1159F MED LIST DOCD IN RCRD: CPT | Performed by: FAMILY MEDICINE

## 2023-11-10 PROCEDURE — G0008 ADMIN INFLUENZA VIRUS VAC: HCPCS | Performed by: FAMILY MEDICINE

## 2023-11-10 PROCEDURE — 99213 OFFICE O/P EST LOW 20 MIN: CPT | Performed by: FAMILY MEDICINE

## 2023-11-10 PROCEDURE — 1125F AMNT PAIN NOTED PAIN PRSNT: CPT | Performed by: FAMILY MEDICINE

## 2023-11-10 PROCEDURE — 1160F RVW MEDS BY RX/DR IN RCRD: CPT | Performed by: FAMILY MEDICINE

## 2023-11-10 PROCEDURE — 90662 IIV NO PRSV INCREASED AG IM: CPT | Performed by: FAMILY MEDICINE

## 2023-11-10 PROCEDURE — 3078F DIAST BP <80 MM HG: CPT | Performed by: FAMILY MEDICINE

## 2023-11-10 PROCEDURE — 3074F SYST BP LT 130 MM HG: CPT | Performed by: FAMILY MEDICINE

## 2023-11-10 PROCEDURE — 1036F TOBACCO NON-USER: CPT | Performed by: FAMILY MEDICINE

## 2023-11-10 RX ORDER — NITROGLYCERIN 0.4 MG/1
0.4 TABLET SUBLINGUAL EVERY 5 MIN PRN
Qty: 25 TABLET | Refills: 1 | Status: CANCELLED | OUTPATIENT
Start: 2023-11-10

## 2023-11-10 RX ORDER — METFORMIN HYDROCHLORIDE 1000 MG/1
1000 TABLET ORAL
Qty: 180 TABLET | Refills: 1 | Status: CANCELLED | OUTPATIENT
Start: 2023-11-10

## 2023-11-10 RX ORDER — NITROGLYCERIN 0.4 MG/1
0.4 TABLET SUBLINGUAL EVERY 5 MIN PRN
Qty: 30 TABLET | Refills: 1 | Status: SHIPPED | OUTPATIENT
Start: 2023-11-10 | End: 2023-11-16

## 2023-11-10 ASSESSMENT — PATIENT HEALTH QUESTIONNAIRE - PHQ9
2. FEELING DOWN, DEPRESSED OR HOPELESS: NOT AT ALL
SUM OF ALL RESPONSES TO PHQ9 QUESTIONS 1 AND 2: 0
1. LITTLE INTEREST OR PLEASURE IN DOING THINGS: NOT AT ALL

## 2023-11-10 ASSESSMENT — ENCOUNTER SYMPTOMS
GASTROINTESTINAL NEGATIVE: 1
RESPIRATORY NEGATIVE: 1
CARDIOVASCULAR NEGATIVE: 1
ENDOCRINE NEGATIVE: 1
HEMATOLOGIC/LYMPHATIC NEGATIVE: 1
MUSCULOSKELETAL NEGATIVE: 1
ALLERGIC/IMMUNOLOGIC NEGATIVE: 1
EYES NEGATIVE: 1
FATIGUE: 1
PSYCHIATRIC NEGATIVE: 1
WEAKNESS: 1

## 2023-11-10 NOTE — PROGRESS NOTES
Pat Hua is here for follow-up on diabetes and hypertension.  He states that he has overall been feeling well.  He does have chronic weakness because of his chronic anemia that is being followed closely by his hematologist.  Patient has not had any recent falls and he does use a cane on occasion for his gait.  He sees Dr. Burnett for nephrology care and Dr. Goodwin for cardiology care.  He continues on his meds noted.    Patient ID: Chas Melgar is a 78 y.o. male who presents for Follow-up (Routine 4 month follow up:  metformin and nitroglycerin refill:  flu shot: drooling:):    Problem List Items Addressed This Visit       Angina, class II (CMS/[facility])    Diabetes mellitus treated with insulin and oral medication (CMS/[facility])     Other Visit Diagnoses       Need for influenza vaccination               Past Medical History:   Diagnosis Date    Acute gastric ulcer with hemorrhage 09/07/2022    Acute gastric ulcer with bleeding    Acute upper respiratory infection, unspecified     URI, acute    Body mass index (BMI) 28.0-28.9, adult 10/19/2021    BMI 28.0-28.9,adult    Encounter for other preprocedural examination 10/14/2021    Pre-operative clearance    Encounter for screening for malignant neoplasm of prostate     Encounter for prostate cancer screening    Impingement syndrome of unspecified shoulder 07/22/2021    Impingement syndrome of shoulder region, unspecified laterality    Other abnormalities of breathing     Difficulty breathing    Other specified disorders of pancreatic internal secretion     Other specified disorders of pancreatic internal secretion    Other specified follicular disorders     Actinic folliculitis    Other specified postprocedural states 06/09/2021    Status post arthroscopy of left shoulder    Overweight 02/21/2022    Overweight with body mass index (BMI) of 29 to 29.9 in adult    Overweight 01/18/2022    Overweight with body mass index (BMI) of 28 to 28.9 in adult    Pain in  right hip     Hip pain, right    Pain in right leg     Pain of right lower extremity    Pain in unspecified hip 08/04/2021    Hip joint pain    Pain in unspecified shoulder 08/10/2021    Shoulder pain    Personal history of malignant neoplasm, unspecified 08/04/2021    History of malignant neoplasm    Personal history of other diseases of male genital organs     History of benign prostatic hyperplasia    Personal history of other diseases of the circulatory system 08/04/2021    History of hypertension    Personal history of other diseases of the circulatory system 08/04/2021    History of cardiac disorder    Personal history of other diseases of the digestive system 10/14/2021    History of gastrointestinal hemorrhage    Personal history of other diseases of the digestive system 02/26/2022    History of right inguinal hernia    Personal history of other diseases of the digestive system     History of fatty infiltration of liver    Personal history of other diseases of the musculoskeletal system and connective tissue 08/04/2021    History of arthritis    Personal history of other diseases of the musculoskeletal system and connective tissue 08/05/2021    History of rotator cuff tear    Personal history of other diseases of the respiratory system 11/17/2021    History of upper respiratory infection    Personal history of other diseases of the respiratory system     History of acute bronchitis    Personal history of other diseases of the respiratory system     History of acute pharyngitis    Personal history of other diseases of the respiratory system     History of acute sinusitis    Personal history of other diseases of urinary system 09/01/2022    History of renal insufficiency syndrome    Personal history of other endocrine, nutritional and metabolic disease 10/06/2021    History of diabetes mellitus    Personal history of other endocrine, nutritional and metabolic disease 01/18/2022    History of hyperkalemia     Personal history of other malignant neoplasm of skin     History of other malignant neoplasm of skin    Personal history of other specified conditions 09/01/2022    History of chest pain    Personal history of other specified conditions     History of dysuria    Personal history of other specified conditions     History of chest pain    Persons encountering health services in other specified circumstances     Encounter for support and coordination of transition of care    Primary osteoarthritis, 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    DJD of left shoulder    Right lower quadrant pain 02/26/2022    Right inguinal pain    Strain of muscle, fascia and tendon of lower back, initial encounter     Lumbar strain    Strain of muscle, fascia and tendon of other parts of biceps, right arm, initial encounter 10/14/2021    Rupture of right biceps tendon, initial encounter    Trochanteric bursitis, left hip 08/10/2021    Trochanteric bursitis of left hip      Past Surgical History:   Procedure Laterality Date    CT AORTA AND BILATERAL ILIOFEMORAL RUNOFF ANGIOGRAM W AND/OR WO IV CONTRAST  10/27/2020    CT AORTA AND BILATERAL ILIOFEMORAL RUNOFF ANGIOGRAM W AND/OR WO IV CONTRAST 10/27/2020 [facility]    OTHER SURGICAL HISTORY  11/07/2022    Colonoscopy    OTHER SURGICAL HISTORY  10/14/2021    Cardiac catheterization with stent placement    OTHER SURGICAL HISTORY  10/14/2021    PTA femoral artery    OTHER SURGICAL HISTORY  10/14/2021    Rotator cuff repair    OTHER SURGICAL HISTORY  10/14/2021    Inguinal hernia repair    OTHER SURGICAL HISTORY  10/14/2021    Knee arthroscopy    OTHER SURGICAL HISTORY  11/17/2021    Skin biopsy      Family History   Problem Relation Name Age of Onset    Coronary artery disease Father      Hypertension Other      Alzheimer's disease Other        Social History     Socioeconomic History    Marital status:      Spouse name: Not on file    Number of children: Not on file    Years of education: Not  "on file    Highest education level: Not on file   Occupational History    Not on file   Tobacco Use    Smoking status: Former     Types: Cigarettes     Quit date: 1986     Years since quittin.8    Smokeless tobacco: Never   Substance and Sexual Activity    Alcohol use: Yes     Alcohol/week: 2.0 standard drinks of alcohol     Types: 2 Shots of liquor per week    Drug use: Never    Sexual activity: Not on file   Other Topics Concern    Not on file   Social History Narrative    Not on file     Social Determinants of Health     Financial Resource Strain: Not on file   Food Insecurity: Not on file   Transportation Needs: Not on file   Physical Activity: Not on file   Stress: Not on file   Social Connections: Not on file   Intimate Partner Violence: Not on file   Housing Stability: Not on file      Aluminum and Nickel   Current Outpatient Medications   Medication Sig Dispense Refill    aspirin 81 mg EC tablet Take 1 tablet (81 mg) by mouth once daily.      BD Ultra-Fine Short Pen Needle 31 gauge x 5/16\" needle USE ONCE DAILY WITH LEVEMIR      clopidogrel (Plavix) 75 mg tablet Take 1 tablet (75 mg) by mouth once daily.      cyanocobalamin (Vitamin B-12) 1,000 mcg tablet Take 2 tablets (2,000 mcg) by mouth once daily.      dilTIAZem CD (Cardizem CD) 240 mg 24 hr capsule Take 1 capsule (240 mg) by mouth once daily.      ergocalciferol (Vitamin D-2) 1.25 MG (95247 UT) capsule Take 1 capsule (1,250 mcg) by mouth every 14 (fourteen) days.      Farxiga 10 mg TAKE 1 TABLET BY MOUTH EVERY MORNING 90 tablet 1    folic acid (Folvite) 400 mcg tablet Take 2 tablets (0.8 mg) by mouth once daily at bedtime.      glimepiride (Amaryl) 4 mg tablet Take 1 tablet (4 mg) by mouth twice a day.      hydroCHLOROthiazide (HYDRODiuril) 25 mg tablet TAKE 1 TABLET BY MOUTH DAILY 90 tablet 0    insulin detemir (Levemir FlexPen) 100 unit/mL (3 mL) pen Inject 15 Units under the skin once daily at bedtime.      isosorbide mononitrate ER (Imdur) " 30 mg 24 hr tablet Take 1 tablet (30 mg) by mouth once daily. 90 tablet 3    lisinopril 30 mg tablet Take 1 tablet (30 mg) by mouth once daily.      luspatercept-aamt (ReblozyL) 25 mg recon soln Inject under the skin. As directed      metFORMIN (Glucophage) 1,000 mg tablet Take 1 tablet (1,000 mg) by mouth twice a day.      nitroglycerin (Nitrostat) 0.4 mg SL tablet Place 1 tablet (0.4 mg) under the tongue every 5 minutes if needed for chest pain. up to 3 doses As Needed - for chest pain      pantoprazole (ProtoNix) 40 mg EC tablet Take 1 tablet (40 mg) by mouth once daily.      simvastatin (Zocor) 20 mg tablet Take 1 tablet (20 mg) by mouth once daily at bedtime. 90 tablet 3    tamsulosin (Flomax) 0.4 mg 24 hr capsule Take 1 capsule (0.4 mg) by mouth once daily.       No current facility-administered medications for this visit.       Immunization History   Administered Date(s) Administered    Flu vaccine, quadrivalent, high-dose, preservative free, age 65y+ (FLUZONE) 10/28/2020    Hep A / Hep B 05/08/2014, 06/16/2014, 11/12/2014    Influenza, High Dose Seasonal, Preservative Free 11/16/2017, 09/20/2018, 11/29/2019    Influenza, Unspecified 10/01/2011, 10/03/2012, 10/01/2013, 10/01/2014, 10/01/2015, 10/14/2016    Pfizer Purple Cap SARS-CoV-2 02/27/2021, 03/20/2021, 01/22/2022    Pneumococcal conjugate vaccine, 13-valent (PREVNAR 13) 11/30/2016    Pneumococcal polysaccharide vaccine, 23-valent, age 2 years and older (PNEUMOVAX 23) 06/19/2011        Review of Systems   Constitutional:  Positive for fatigue.   HENT: Negative.     Eyes: Negative.    Respiratory: Negative.     Cardiovascular: Negative.    Gastrointestinal: Negative.    Endocrine: Negative.    Genitourinary: Negative.    Musculoskeletal: Negative.    Skin: Negative.    Allergic/Immunologic: Negative.    Neurological:  Positive for weakness.   Hematological: Negative.    Psychiatric/Behavioral: Negative.     All other systems reviewed and are negative.        Vitals:    11/10/23 0921   BP: 116/57   Pulse: 82     Vitals:    11/10/23 0921   Weight: 76.9 kg (169 lb 9.6 oz)      Physical Exam  Constitutional:       General: He is not in acute distress.     Appearance: Normal appearance.   Cardiovascular:      Rate and Rhythm: Normal rate and regular rhythm.   Pulmonary:      Effort: Pulmonary effort is normal.      Breath sounds: Normal breath sounds.   Neurological:      Mental Status: He is alert.   Psychiatric:         Mood and Affect: Mood normal.         Thought Content: Thought content normal.     Heart-regular S1 and S2 with a grade 1/6 systolic ejection murmur at the left sternal border  The neck is supple.  There is a right carotid bruit.  Recent carotid duplex scan was unremarkable  ASSESSMENT/PLAN: Diabetes mellitus type 2  Hypertension stable  Peripheral vascular disease  Carotid vascular disease  Chronic anemia followed by hematology  Chronic kidney disease  Generalized weakness  Hyperlipidemia    Plan--check A1c lipid profile and TSH  Continue current meds except discontinue metformin  Follow-up closely with Dr. Burnett for nephrology care and Dr. Bowser for hematology care  We discussed the importance of fall prevention.  Recommended using a cane at all times  Try to stay active within his limits  Follow-up in 4 months and call as needed

## 2023-11-13 ENCOUNTER — INFUSION (OUTPATIENT)
Dept: HEMATOLOGY/ONCOLOGY | Facility: CLINIC | Age: 78
End: 2023-11-13
Payer: MEDICARE

## 2023-11-13 ENCOUNTER — LAB (OUTPATIENT)
Dept: LAB | Facility: CLINIC | Age: 78
End: 2023-11-13
Payer: MEDICARE

## 2023-11-13 VITALS
HEART RATE: 89 BPM | TEMPERATURE: 97.2 F | SYSTOLIC BLOOD PRESSURE: 122 MMHG | BODY MASS INDEX: 27.78 KG/M2 | DIASTOLIC BLOOD PRESSURE: 62 MMHG | WEIGHT: 169.53 LBS | RESPIRATION RATE: 17 BRPM | OXYGEN SATURATION: 95 %

## 2023-11-13 DIAGNOSIS — D46.Z MDS (MYELODYSPLASTIC SYNDROME), LOW GRADE (MULTI): ICD-10-CM

## 2023-11-13 DIAGNOSIS — D46.1 REFRACTORY ANEMIA WITH RING SIDEROBLASTS (MULTI): ICD-10-CM

## 2023-11-13 LAB
ALBUMIN SERPL BCP-MCNC: 4.4 G/DL (ref 3.4–5)
ALP SERPL-CCNC: 77 U/L (ref 33–136)
ALT SERPL W P-5'-P-CCNC: 13 U/L (ref 10–52)
ANION GAP SERPL CALC-SCNC: 14 MMOL/L (ref 10–20)
AST SERPL W P-5'-P-CCNC: 11 U/L (ref 9–39)
BASO STIPL BLD QL SMEAR: PRESENT
BASOPHILS # BLD AUTO: 0.09 X10*3/UL (ref 0–0.1)
BASOPHILS NFR BLD AUTO: 1.2 %
BILIRUB SERPL-MCNC: 1.3 MG/DL (ref 0–1.2)
BUN SERPL-MCNC: 28 MG/DL (ref 6–23)
CALCIUM SERPL-MCNC: 9.3 MG/DL (ref 8.6–10.3)
CHLORIDE SERPL-SCNC: 102 MMOL/L (ref 98–107)
CO2 SERPL-SCNC: 26 MMOL/L (ref 21–32)
CREAT SERPL-MCNC: 1.76 MG/DL (ref 0.5–1.3)
DACRYOCYTES BLD QL SMEAR: NORMAL
EOSINOPHIL # BLD AUTO: 0.67 X10*3/UL (ref 0–0.4)
EOSINOPHIL NFR BLD AUTO: 8.9 %
ERYTHROCYTE [DISTWIDTH] IN BLOOD BY AUTOMATED COUNT: 21.8 % (ref 11.5–14.5)
GFR SERPL CREATININE-BSD FRML MDRD: 39 ML/MIN/1.73M*2
GLUCOSE SERPL-MCNC: 317 MG/DL (ref 74–99)
HCT VFR BLD AUTO: 25.6 % (ref 41–52)
HGB BLD-MCNC: 8.4 G/DL (ref 13.5–17.5)
HYPOCHROMIA BLD QL SMEAR: NORMAL
IMM GRANULOCYTES # BLD AUTO: 0.05 X10*3/UL (ref 0–0.5)
IMM GRANULOCYTES NFR BLD AUTO: 0.7 % (ref 0–0.9)
LYMPHOCYTES # BLD AUTO: 1.62 X10*3/UL (ref 0.8–3)
LYMPHOCYTES NFR BLD AUTO: 21.6 %
MCH RBC QN AUTO: 36.2 PG (ref 26–34)
MCHC RBC AUTO-ENTMCNC: 32.8 G/DL (ref 32–36)
MCV RBC AUTO: 110 FL (ref 80–100)
MONOCYTES # BLD AUTO: 0.7 X10*3/UL (ref 0.05–0.8)
MONOCYTES NFR BLD AUTO: 9.3 %
NEUTROPHILS # BLD AUTO: 4.38 X10*3/UL (ref 1.6–5.5)
NEUTROPHILS NFR BLD AUTO: 58.3 %
NRBC BLD-RTO: ABNORMAL /100{WBCS}
OVALOCYTES BLD QL SMEAR: NORMAL
PLATELET # BLD AUTO: 202 X10*3/UL (ref 150–450)
POLYCHROMASIA BLD QL SMEAR: NORMAL
POTASSIUM SERPL-SCNC: 4.2 MMOL/L (ref 3.5–5.3)
PROT SERPL-MCNC: 6.6 G/DL (ref 6.4–8.2)
RBC # BLD AUTO: 2.32 X10*6/UL (ref 4.5–5.9)
RBC MORPH BLD: NORMAL
SODIUM SERPL-SCNC: 138 MMOL/L (ref 136–145)
TARGETS BLD QL SMEAR: NORMAL
WBC # BLD AUTO: 7.5 X10*3/UL (ref 4.4–11.3)

## 2023-11-13 PROCEDURE — 36415 COLL VENOUS BLD VENIPUNCTURE: CPT

## 2023-11-13 PROCEDURE — 82374 ASSAY BLOOD CARBON DIOXIDE: CPT

## 2023-11-13 PROCEDURE — 96372 THER/PROPH/DIAG INJ SC/IM: CPT

## 2023-11-13 PROCEDURE — 85025 COMPLETE CBC W/AUTO DIFF WBC: CPT

## 2023-11-13 PROCEDURE — 2500000004 HC RX 250 GENERAL PHARMACY W/ HCPCS (ALT 636 FOR OP/ED): Mod: JW | Performed by: INTERNAL MEDICINE

## 2023-11-13 PROCEDURE — 82565 ASSAY OF CREATININE: CPT

## 2023-11-13 RX ORDER — DIPHENHYDRAMINE HYDROCHLORIDE 50 MG/ML
50 INJECTION INTRAMUSCULAR; INTRAVENOUS AS NEEDED
Status: CANCELLED | OUTPATIENT
Start: 2023-12-04

## 2023-11-13 RX ORDER — HEPARIN 100 UNIT/ML
500 SYRINGE INTRAVENOUS AS NEEDED
OUTPATIENT
Start: 2023-11-13

## 2023-11-13 RX ORDER — ALBUTEROL SULFATE 0.83 MG/ML
3 SOLUTION RESPIRATORY (INHALATION) AS NEEDED
Status: DISCONTINUED | OUTPATIENT
Start: 2023-11-13 | End: 2023-11-13 | Stop reason: HOSPADM

## 2023-11-13 RX ORDER — HEPARIN SODIUM,PORCINE/PF 10 UNIT/ML
50 SYRINGE (ML) INTRAVENOUS AS NEEDED
OUTPATIENT
Start: 2023-11-13

## 2023-11-13 RX ORDER — EPINEPHRINE 0.3 MG/.3ML
0.3 INJECTION SUBCUTANEOUS EVERY 5 MIN PRN
Status: DISCONTINUED | OUTPATIENT
Start: 2023-11-13 | End: 2023-11-13 | Stop reason: HOSPADM

## 2023-11-13 RX ORDER — DIPHENHYDRAMINE HYDROCHLORIDE 50 MG/ML
50 INJECTION INTRAMUSCULAR; INTRAVENOUS AS NEEDED
Status: DISCONTINUED | OUTPATIENT
Start: 2023-11-13 | End: 2023-11-13 | Stop reason: HOSPADM

## 2023-11-13 RX ORDER — FAMOTIDINE 10 MG/ML
20 INJECTION INTRAVENOUS ONCE AS NEEDED
Status: DISCONTINUED | OUTPATIENT
Start: 2023-11-13 | End: 2023-11-13 | Stop reason: HOSPADM

## 2023-11-13 RX ORDER — EPINEPHRINE 0.3 MG/.3ML
0.3 INJECTION SUBCUTANEOUS EVERY 5 MIN PRN
Status: CANCELLED | OUTPATIENT
Start: 2023-12-04

## 2023-11-13 RX ORDER — ALBUTEROL SULFATE 0.83 MG/ML
3 SOLUTION RESPIRATORY (INHALATION) AS NEEDED
Status: CANCELLED | OUTPATIENT
Start: 2023-12-04

## 2023-11-13 RX ORDER — FAMOTIDINE 10 MG/ML
20 INJECTION INTRAVENOUS ONCE AS NEEDED
Status: CANCELLED | OUTPATIENT
Start: 2023-12-04

## 2023-11-13 RX ADMIN — LUSPATERCEPT 102.5 MG: 75 INJECTION, POWDER, LYOPHILIZED, FOR SOLUTION SUBCUTANEOUS at 11:21

## 2023-11-13 ASSESSMENT — PATIENT HEALTH QUESTIONNAIRE - PHQ9
2. FEELING DOWN, DEPRESSED OR HOPELESS: NOT AT ALL
1. LITTLE INTEREST OR PLEASURE IN DOING THINGS: NOT AT ALL
SUM OF ALL RESPONSES TO PHQ9 QUESTIONS 1 AND 2: 0

## 2023-11-13 ASSESSMENT — COLUMBIA-SUICIDE SEVERITY RATING SCALE - C-SSRS
2. HAVE YOU ACTUALLY HAD ANY THOUGHTS OF KILLING YOURSELF?: NO
6. HAVE YOU EVER DONE ANYTHING, STARTED TO DO ANYTHING, OR PREPARED TO DO ANYTHING TO END YOUR LIFE?: NO
1. IN THE PAST MONTH, HAVE YOU WISHED YOU WERE DEAD OR WISHED YOU COULD GO TO SLEEP AND NOT WAKE UP?: NO

## 2023-11-13 ASSESSMENT — PAIN SCALES - GENERAL: PAINLEVEL: 4

## 2023-11-16 ENCOUNTER — ANCILLARY PROCEDURE (OUTPATIENT)
Dept: RADIOLOGY | Facility: CLINIC | Age: 78
End: 2023-11-16
Payer: MEDICARE

## 2023-11-16 ENCOUNTER — OFFICE VISIT (OUTPATIENT)
Dept: ORTHOPEDIC SURGERY | Facility: CLINIC | Age: 78
End: 2023-11-16
Payer: MEDICARE

## 2023-11-16 VITALS — WEIGHT: 165 LBS | HEIGHT: 66 IN | BODY MASS INDEX: 26.52 KG/M2

## 2023-11-16 DIAGNOSIS — M25.552 LEFT HIP PAIN: ICD-10-CM

## 2023-11-16 DIAGNOSIS — M70.62 TROCHANTERIC BURSITIS OF LEFT HIP: ICD-10-CM

## 2023-11-16 PROCEDURE — 1159F MED LIST DOCD IN RCRD: CPT | Performed by: ORTHOPAEDIC SURGERY

## 2023-11-16 PROCEDURE — 1125F AMNT PAIN NOTED PAIN PRSNT: CPT | Performed by: ORTHOPAEDIC SURGERY

## 2023-11-16 PROCEDURE — 73502 X-RAY EXAM HIP UNI 2-3 VIEWS: CPT | Mod: LEFT SIDE | Performed by: ORTHOPAEDIC SURGERY

## 2023-11-16 PROCEDURE — 20610 DRAIN/INJ JOINT/BURSA W/O US: CPT | Performed by: ORTHOPAEDIC SURGERY

## 2023-11-16 PROCEDURE — 1036F TOBACCO NON-USER: CPT | Performed by: ORTHOPAEDIC SURGERY

## 2023-11-16 PROCEDURE — 99214 OFFICE O/P EST MOD 30 MIN: CPT | Performed by: ORTHOPAEDIC SURGERY

## 2023-11-16 PROCEDURE — 73502 X-RAY EXAM HIP UNI 2-3 VIEWS: CPT | Mod: LT

## 2023-11-16 PROCEDURE — 20610 DRAIN/INJ JOINT/BURSA W/O US: CPT | Mod: PO | Performed by: ORTHOPAEDIC SURGERY

## 2023-11-16 PROCEDURE — 2500000004 HC RX 250 GENERAL PHARMACY W/ HCPCS (ALT 636 FOR OP/ED): Mod: PO | Performed by: ORTHOPAEDIC SURGERY

## 2023-11-16 PROCEDURE — 99214 OFFICE O/P EST MOD 30 MIN: CPT | Mod: PO | Performed by: ORTHOPAEDIC SURGERY

## 2023-11-16 PROCEDURE — 1160F RVW MEDS BY RX/DR IN RCRD: CPT | Performed by: ORTHOPAEDIC SURGERY

## 2023-11-16 PROCEDURE — 2500000005 HC RX 250 GENERAL PHARMACY W/O HCPCS: Mod: PO | Performed by: ORTHOPAEDIC SURGERY

## 2023-11-16 RX ORDER — BETAMETHASONE SODIUM PHOSPHATE AND BETAMETHASONE ACETATE 3; 3 MG/ML; MG/ML
12 INJECTION, SUSPENSION INTRA-ARTICULAR; INTRALESIONAL; INTRAMUSCULAR; SOFT TISSUE ONCE
Status: COMPLETED | OUTPATIENT
Start: 2023-11-16 | End: 2023-11-16

## 2023-11-16 RX ORDER — LIDOCAINE HYDROCHLORIDE 10 MG/ML
5 INJECTION INFILTRATION; PERINEURAL ONCE
Status: COMPLETED | OUTPATIENT
Start: 2023-11-16 | End: 2023-11-16

## 2023-11-16 RX ADMIN — BETAMETHASONE SODIUM PHOSPHATE AND BETAMETHASONE ACETATE 12 MG: 3; 3 INJECTION, SUSPENSION INTRA-ARTICULAR; INTRALESIONAL; INTRAMUSCULAR at 10:07

## 2023-11-16 RX ADMIN — LIDOCAINE HYDROCHLORIDE 5 ML: 10 INJECTION, SOLUTION INFILTRATION; PERINEURAL at 10:08

## 2023-11-16 ASSESSMENT — PAIN - FUNCTIONAL ASSESSMENT: PAIN_FUNCTIONAL_ASSESSMENT: 0-10

## 2023-11-16 ASSESSMENT — PATIENT HEALTH QUESTIONNAIRE - PHQ9
1. LITTLE INTEREST OR PLEASURE IN DOING THINGS: NOT AT ALL
2. FEELING DOWN, DEPRESSED OR HOPELESS: NOT AT ALL
SUM OF ALL RESPONSES TO PHQ9 QUESTIONS 1 AND 2: 0

## 2023-11-16 ASSESSMENT — PAIN SCALES - GENERAL: PAINLEVEL_OUTOF10: 3

## 2023-11-16 NOTE — PROGRESS NOTES
History of present illness: Patient with chronic left hip trochanteric bursitis    He has a history of low back pain who is seen Dr. Osman flores in the office he is on shoulder pain who is seen Dr. Bart Ramirez for and he seen Dr. Hilliard for his knee he comes in today for left hip evaluation and aggravating his hip this summer but mostly over the trochanteric bursa    Physical exam:    General: No acute distress or breathing difficulty or discomfort, pleasant and cooperative with the examination.    Extremities: The affected left hip was examined and inspected.  There was tenderness to touch along the groin and over the lateral aspect of the hip over the bursal area.  Hip joint moves freely.    There was no pain over the groin area to internal/external rotation abduction.    Palpable reproducible pain was reproduced with palpation over the lateral trochanteric process.  There was a tight IT band with a positive Tito sign.      The skin was intact without breakdown or open wound.  Old incisions of present were all healed.  There was mild crepitus seen with internal and external rotation and range of motion without evidence of gross instability.    Inspection of the low back showed normal curvature integrity of the skin.  The strength and stability of the low back and ligaments were within normal limits.    There was a normal straight leg raise with no foot drop, numbness or tingling in the bilateral lower extremities.  Sensation, reflexes and pulses in the foot and ankle are preserved and present.  There was no obvious effusion.    Range of motion showed flexion to beyond 100 degrees degrees, abduction to 30 degrees, external rotation to 15 degrees and 18 degrees of internal rotation.  The patient had the ability to bear weight but with discomfort.  The patient's gait antalgic  secondary to discomfort      Before aspiration injection the benefits of a cortisone injection including infection, local skin irritation,  skin atrophy, calcification, continued pain and discomfort, elevated blood sugar, burning, failure to relieve pain, possible late infection were discussed with the patient.    Postprocedure discomfort can be alleviated with additional medications, ice, elevation, rest over the first 24 hours as recommended.    Patient verbalized understanding and wanted to proceed with the planned procedure.    After informed consent was provided and allergies verified, the patient was positioned appropriately on the  bed.  The left hip to be aspirated and injected was prepped and draped in a sterile fashion.  The skin was anesthetized with ethyl chloride spray.  A joint aspiration was to be performed an 18-gauge needle was used otherwise a 22-gauge needle was used to inject the appropriate joint.    Joint injection was performed with a mixture of 5 cc 1% lidocaine plain and 2 cc Celestone Soluspan 6 mg per mL.  The needle was removed and the puncture site closed and sealed with a Band-Aid.  The patient tolerated the procedure well.    Diagnostic studies: X-rays show heavily calcified arteries but otherwise fairly well-preserved left hip joint no fracture dislocation noted    Impression: Trochanteric bursitis left hip    Plan: Injection therapy stretching  program    He tolerated the shot quite nicely local modalities moist heat stretching we offered formal therapy he declined he said he has been through therapy for his back    He will see us back in 5 6 weeks or after the holidays if he fails to improve we can talk about advanced imaging studies additionally it is recommended that formal PT and stretching can be very beneficial but again he declined we will see him back then after the holidays if he needed    If he does well he can cancel visits and would see him back then possibly in the future if needed for additional injections and treatment

## 2023-11-21 ENCOUNTER — APPOINTMENT (OUTPATIENT)
Dept: RADIOLOGY | Facility: HOSPITAL | Age: 78
End: 2023-11-21
Payer: MEDICARE

## 2023-11-21 ENCOUNTER — HOSPITAL ENCOUNTER (EMERGENCY)
Facility: HOSPITAL | Age: 78
Discharge: HOME | End: 2023-11-21
Attending: EMERGENCY MEDICINE
Payer: MEDICARE

## 2023-11-21 VITALS
TEMPERATURE: 97.7 F | DIASTOLIC BLOOD PRESSURE: 62 MMHG | OXYGEN SATURATION: 98 % | HEIGHT: 66 IN | BODY MASS INDEX: 26.52 KG/M2 | SYSTOLIC BLOOD PRESSURE: 133 MMHG | RESPIRATION RATE: 20 BRPM | WEIGHT: 165 LBS | HEART RATE: 82 BPM
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
## 2023-11-21 DIAGNOSIS — S22.42XA CLOSED FRACTURE OF MULTIPLE RIBS OF LEFT SIDE, INITIAL ENCOUNTER: Primary | ICD-10-CM

## 2023-11-21 LAB
ALBUMIN SERPL BCP-MCNC: 4.8 G/DL (ref 3.4–5)
ALP SERPL-CCNC: 104 U/L (ref 33–136)
ALT SERPL W P-5'-P-CCNC: 11 U/L (ref 10–52)
ANION GAP SERPL CALC-SCNC: 15 MMOL/L (ref 10–20)
APTT PPP: 31 SECONDS (ref 27–38)
AST SERPL W P-5'-P-CCNC: 8 U/L (ref 9–39)
BASOPHILS # BLD AUTO: 0.07 X10*3/UL (ref 0–0.1)
BASOPHILS NFR BLD AUTO: 0.9 %
BILIRUB SERPL-MCNC: 1.4 MG/DL (ref 0–1.2)
BUN SERPL-MCNC: 39 MG/DL (ref 6–23)
CALCIUM SERPL-MCNC: 9.6 MG/DL (ref 8.6–10.3)
CARDIAC TROPONIN I PNL SERPL HS: 19 NG/L (ref 0–20)
CHLORIDE SERPL-SCNC: 100 MMOL/L (ref 98–107)
CO2 SERPL-SCNC: 26 MMOL/L (ref 21–32)
CREAT SERPL-MCNC: 1.77 MG/DL (ref 0.5–1.3)
EOSINOPHIL # BLD AUTO: 0.52 X10*3/UL (ref 0–0.4)
EOSINOPHIL NFR BLD AUTO: 6.9 %
ERYTHROCYTE [DISTWIDTH] IN BLOOD BY AUTOMATED COUNT: 22.4 % (ref 11.5–14.5)
GFR SERPL CREATININE-BSD FRML MDRD: 39 ML/MIN/1.73M*2
GLUCOSE SERPL-MCNC: 272 MG/DL (ref 74–99)
HCT VFR BLD AUTO: 29.5 % (ref 41–52)
HGB BLD-MCNC: 9.7 G/DL (ref 13.5–17.5)
IMM GRANULOCYTES # BLD AUTO: 0.1 X10*3/UL (ref 0–0.5)
IMM GRANULOCYTES NFR BLD AUTO: 1.3 % (ref 0–0.9)
INR PPP: 1.2 (ref 0.9–1.1)
LYMPHOCYTES # BLD AUTO: 1.93 X10*3/UL (ref 0.8–3)
LYMPHOCYTES NFR BLD AUTO: 25.6 %
MCH RBC QN AUTO: 36.3 PG (ref 26–34)
MCHC RBC AUTO-ENTMCNC: 32.9 G/DL (ref 32–36)
MCV RBC AUTO: 111 FL (ref 80–100)
MONOCYTES # BLD AUTO: 1.07 X10*3/UL (ref 0.05–0.8)
MONOCYTES NFR BLD AUTO: 14.2 %
NEUTROPHILS # BLD AUTO: 3.85 X10*3/UL (ref 1.6–5.5)
NEUTROPHILS NFR BLD AUTO: 51.1 %
NRBC BLD-RTO: 0.3 /100 WBCS (ref 0–0)
PLATELET # BLD AUTO: 197 X10*3/UL (ref 150–450)
POTASSIUM SERPL-SCNC: 4.2 MMOL/L (ref 3.5–5.3)
PROT SERPL-MCNC: 7.3 G/DL (ref 6.4–8.2)
PROTHROMBIN TIME: 13.4 SECONDS (ref 9.8–12.8)
RBC # BLD AUTO: 2.67 X10*6/UL (ref 4.5–5.9)
SODIUM SERPL-SCNC: 137 MMOL/L (ref 136–145)
WBC # BLD AUTO: 7.5 X10*3/UL (ref 4.4–11.3)

## 2023-11-21 PROCEDURE — 36415 COLL VENOUS BLD VENIPUNCTURE: CPT | Performed by: EMERGENCY MEDICINE

## 2023-11-21 PROCEDURE — 96360 HYDRATION IV INFUSION INIT: CPT

## 2023-11-21 PROCEDURE — 2500000005 HC RX 250 GENERAL PHARMACY W/O HCPCS: Performed by: EMERGENCY MEDICINE

## 2023-11-21 PROCEDURE — 74177 CT ABD & PELVIS W/CONTRAST: CPT

## 2023-11-21 PROCEDURE — 99285 EMERGENCY DEPT VISIT HI MDM: CPT | Performed by: EMERGENCY MEDICINE

## 2023-11-21 PROCEDURE — 96361 HYDRATE IV INFUSION ADD-ON: CPT

## 2023-11-21 PROCEDURE — 85730 THROMBOPLASTIN TIME PARTIAL: CPT | Performed by: EMERGENCY MEDICINE

## 2023-11-21 PROCEDURE — 2550000001 HC RX 255 CONTRASTS: Performed by: EMERGENCY MEDICINE

## 2023-11-21 PROCEDURE — 85025 COMPLETE CBC W/AUTO DIFF WBC: CPT | Performed by: EMERGENCY MEDICINE

## 2023-11-21 PROCEDURE — 99285 EMERGENCY DEPT VISIT HI MDM: CPT | Mod: 25 | Performed by: EMERGENCY MEDICINE

## 2023-11-21 PROCEDURE — 85610 PROTHROMBIN TIME: CPT | Performed by: EMERGENCY MEDICINE

## 2023-11-21 PROCEDURE — 80053 COMPREHEN METABOLIC PANEL: CPT | Performed by: EMERGENCY MEDICINE

## 2023-11-21 PROCEDURE — 71260 CT THORAX DX C+: CPT

## 2023-11-21 PROCEDURE — 2500000004 HC RX 250 GENERAL PHARMACY W/ HCPCS (ALT 636 FOR OP/ED): Performed by: EMERGENCY MEDICINE

## 2023-11-21 PROCEDURE — 84484 ASSAY OF TROPONIN QUANT: CPT | Performed by: EMERGENCY MEDICINE

## 2023-11-21 RX ORDER — LIDOCAINE 560 MG/1
1 PATCH PERCUTANEOUS; TOPICAL; TRANSDERMAL ONCE
Status: DISCONTINUED | OUTPATIENT
Start: 2023-11-21 | End: 2023-11-21 | Stop reason: HOSPADM

## 2023-11-21 RX ORDER — ACETAMINOPHEN 325 MG/1
650 TABLET ORAL ONCE
Status: DISCONTINUED | OUTPATIENT
Start: 2023-11-21 | End: 2023-11-21 | Stop reason: HOSPADM

## 2023-11-21 RX ORDER — LIDOCAINE 560 MG/1
1 PATCH PERCUTANEOUS; TOPICAL; TRANSDERMAL DAILY
Qty: 10 PATCH | Refills: 0 | Status: SHIPPED | OUTPATIENT
Start: 2023-11-21 | End: 2023-11-30 | Stop reason: SDUPTHER

## 2023-11-21 RX ADMIN — IOHEXOL 75 ML: 350 INJECTION, SOLUTION INTRAVENOUS at 10:05

## 2023-11-21 RX ADMIN — LIDOCAINE 1 PATCH: 4 PATCH TOPICAL at 09:14

## 2023-11-21 RX ADMIN — SODIUM CHLORIDE, POTASSIUM CHLORIDE, SODIUM LACTATE AND CALCIUM CHLORIDE 500 ML: 600; 310; 30; 20 INJECTION, SOLUTION INTRAVENOUS at 10:36

## 2023-11-21 ASSESSMENT — PAIN SCALES - GENERAL: PAINLEVEL_OUTOF10: 2

## 2023-11-21 ASSESSMENT — LIFESTYLE VARIABLES
EVER FELT BAD OR GUILTY ABOUT YOUR DRINKING: NO
HAVE YOU EVER FELT YOU SHOULD CUT DOWN ON YOUR DRINKING: NO
REASON UNABLE TO ASSESS: NO
EVER HAD A DRINK FIRST THING IN THE MORNING TO STEADY YOUR NERVES TO GET RID OF A HANGOVER: NO
HAVE PEOPLE ANNOYED YOU BY CRITICIZING YOUR DRINKING: NO

## 2023-11-21 ASSESSMENT — COLUMBIA-SUICIDE SEVERITY RATING SCALE - C-SSRS
6. HAVE YOU EVER DONE ANYTHING, STARTED TO DO ANYTHING, OR PREPARED TO DO ANYTHING TO END YOUR LIFE?: NO
1. IN THE PAST MONTH, HAVE YOU WISHED YOU WERE DEAD OR WISHED YOU COULD GO TO SLEEP AND NOT WAKE UP?: NO
2. HAVE YOU ACTUALLY HAD ANY THOUGHTS OF KILLING YOURSELF?: NO

## 2023-11-21 ASSESSMENT — PAIN - FUNCTIONAL ASSESSMENT: PAIN_FUNCTIONAL_ASSESSMENT: 0-10

## 2023-11-21 ASSESSMENT — PAIN DESCRIPTION - DESCRIPTORS: DESCRIPTORS: STABBING

## 2023-11-21 ASSESSMENT — PAIN DESCRIPTION - LOCATION: LOCATION: OTHER (COMMENT)

## 2023-11-21 ASSESSMENT — PAIN DESCRIPTION - ORIENTATION: ORIENTATION: LEFT

## 2023-11-21 ASSESSMENT — PAIN DESCRIPTION - FREQUENCY: FREQUENCY: CONSTANT/CONTINUOUS

## 2023-11-21 NOTE — ED PROVIDER NOTES
HPI   Chief Complaint   Patient presents with    Fall       78-year-old male presenting to the emergency department with left-sided chest wall pain.  Patient states that he had a fall about 12 hours ago and landed on the left side of his chest hitting it against a footstool.  Patient states that he was getting up from the couch to go to another couch lost his balance causing him to fall.  He states that he did not hit his head or lose consciousness.  He denies any presyncopal symptoms prior to falling.  He denies any shortness of breath.  He denies any other injuries.  Patient is on Plavix, no other blood thinners.                          [place] Coma Scale Score: 15                  Patient History   Past Medical History:   Diagnosis Date    Acute gastric ulcer with hemorrhage 09/07/2022    Acute gastric ulcer with bleeding    Acute upper respiratory infection, unspecified     URI, acute    Body mass index (BMI) 28.0-28.9, adult 10/19/2021    BMI 28.0-28.9,adult    Encounter for other preprocedural examination 10/14/2021    Pre-operative clearance    Encounter for screening for malignant neoplasm of prostate     Encounter for prostate cancer screening    Impingement syndrome of unspecified shoulder 07/22/2021    Impingement syndrome of shoulder region, unspecified laterality    Other abnormalities of breathing     Difficulty breathing    Other specified disorders of pancreatic internal secretion     Other specified disorders of pancreatic internal secretion    Other specified follicular disorders     Actinic folliculitis    Other specified postprocedural states 06/09/2021    Status post arthroscopy of left shoulder    Overweight 02/21/2022    Overweight with body mass index (BMI) of 29 to 29.9 in adult    Overweight 01/18/2022    Overweight with body mass index (BMI) of 28 to 28.9 in adult    Pain in right hip     Hip pain, right    Pain in right leg     Pain of right lower extremity    Pain in unspecified hip  08/04/2021    Hip joint pain    Pain in unspecified shoulder 08/10/2021    Shoulder pain    Personal history of malignant neoplasm, unspecified 08/04/2021    History of malignant neoplasm    Personal history of other diseases of male genital organs     History of benign prostatic hyperplasia    Personal history of other diseases of the circulatory system 08/04/2021    History of hypertension    Personal history of other diseases of the circulatory system 08/04/2021    History of cardiac disorder    Personal history of other diseases of the digestive system 10/14/2021    History of gastrointestinal hemorrhage    Personal history of other diseases of the digestive system 02/26/2022    History of right inguinal hernia    Personal history of other diseases of the digestive system     History of fatty infiltration of liver    Personal history of other diseases of the musculoskeletal system and connective tissue 08/04/2021    History of arthritis    Personal history of other diseases of the musculoskeletal system and connective tissue 08/05/2021    History of rotator cuff tear    Personal history of other diseases of the respiratory system 11/17/2021    History of upper respiratory infection    Personal history of other diseases of the respiratory system     History of acute bronchitis    Personal history of other diseases of the respiratory system     History of acute pharyngitis    Personal history of other diseases of the respiratory system     History of acute sinusitis    Personal history of other diseases of urinary system 09/01/2022    History of renal insufficiency syndrome    Personal history of other endocrine, nutritional and metabolic disease 10/06/2021    History of diabetes mellitus    Personal history of other endocrine, nutritional and metabolic disease 01/18/2022    History of hyperkalemia    Personal history of other malignant neoplasm of skin     History of other malignant neoplasm of skin    Personal  history of other specified conditions 2022    History of chest pain    Personal history of other specified conditions     History of dysuria    Personal history of other specified conditions     History of chest pain    Persons encountering health services in other specified circumstances     Encounter for support and coordination of transition of care    Primary osteoarthritis, 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    DJD of left shoulder    Right lower quadrant pain 2022    Right inguinal pain    Strain of muscle, fascia and tendon of lower back, initial encounter     Lumbar strain    Strain of muscle, fascia and tendon of other parts of biceps, right arm, initial encounter 10/14/2021    Rupture of right biceps tendon, initial encounter    Trochanteric bursitis, left hip 08/10/2021    Trochanteric bursitis of left hip     Past Surgical History:   Procedure Laterality Date    CT AORTA AND BILATERAL ILIOFEMORAL RUNOFF ANGIOGRAM W AND/OR WO IV CONTRAST  10/27/2020    CT AORTA AND BILATERAL ILIOFEMORAL RUNOFF ANGIOGRAM W AND/OR WO IV CONTRAST 10/27/2020 [facility]    OTHER SURGICAL HISTORY  2022    Colonoscopy    OTHER SURGICAL HISTORY  10/14/2021    Cardiac catheterization with stent placement    OTHER SURGICAL HISTORY  10/14/2021    PTA femoral artery    OTHER SURGICAL HISTORY  10/14/2021    Rotator cuff repair    OTHER SURGICAL HISTORY  10/14/2021    Inguinal hernia repair    OTHER SURGICAL HISTORY  10/14/2021    Knee arthroscopy    OTHER SURGICAL HISTORY  2021    Skin biopsy     Family History   Problem Relation Name Age of Onset    Coronary artery disease Father      Hypertension Other      Alzheimer's disease Other       Social History     Tobacco Use    Smoking status: Former     Types: Cigarettes     Quit date: 1986     Years since quittin.9    Smokeless tobacco: Never   Substance Use Topics    Alcohol use: Yes     Alcohol/week: 2.0 standard drinks of alcohol     Types: 2 Shots of  liquor per week    Drug use: Never       Physical Exam   ED Triage Vitals [11/21/23 0756]   Temp Heart Rate Resp BP   36.5 °C (97.7 °F) 99 18 144/79      SpO2 Temp src Heart Rate Source Patient Position   98 % -- -- --      BP Location FiO2 (%)     -- --       Physical Exam  Vitals and nursing note reviewed.   Constitutional:       Appearance: Normal appearance.   HENT:      Head: Normocephalic.      Nose: Nose normal.      Mouth/Throat:      Mouth: Mucous membranes are moist.   Eyes:      Extraocular Movements: Extraocular movements intact.      Pupils: Pupils are equal, round, and reactive to light.   Cardiovascular:      Rate and Rhythm: Normal rate and regular rhythm.   Pulmonary:      Effort: Pulmonary effort is normal.      Breath sounds: Normal breath sounds.   Chest:      Chest wall: Tenderness (Left lower rib tenderness with palpation) present.   Abdominal:      General: Abdomen is flat.      Tenderness: There is abdominal tenderness (Mild left upper quadrant abdominal pain to palpation).   Musculoskeletal:         General: No signs of injury. Normal range of motion.      Cervical back: Normal range of motion and neck supple.   Skin:     General: Skin is warm and dry.      Capillary Refill: Capillary refill takes less than 2 seconds.      Coloration: Skin is not pale.   Neurological:      General: No focal deficit present.      Mental Status: He is alert and oriented to person, place, and time.      Cranial Nerves: No cranial nerve deficit.      Sensory: No sensory deficit.      Motor: No weakness.   Psychiatric:         Mood and Affect: Mood normal.         Behavior: Behavior normal.         ED Course & MDM   Diagnoses as of 11/21/23 1412   Closed fracture of multiple ribs of left side, initial encounter   Fall, initial encounter     CT chest abdomen pelvis w IV contrast   Final Result   Probable nondisplaced fractures of the anterior left 9th and 10th   ribs near the chondral margins        No solid organ  injury is noted        Right renal mass measuring 1.4 cm extends from the lower posterior   right kidney and is dense, measuring about 85 Hounsfield units. There   is no noncontrast imaging available to determine if this is an   enhancing mass. Most commonly this would be due to a cyst with   hemorrhagic or proteinaceous content, but consider either   surveillance CT with pre and postcontrast imaging or MRI.             MACRO:   None.        Signed by: Meg Castellanos 11/21/2023 10:33 AM   Dictation workstation:   [id number]            Medical Decision Making  Patient presenting with left lower chest wall pain and left upper quadrant abdominal pain after he sustained a mechanical fall about 12 hours prior to arrival.  Patient is on Plavix.  Given concern for rib fracture, with abdominal pain on examination plan to perform CT scan of the chest abdomen pelvis with IV contrast to evaluate for solid organ injury.    Labs and imaging all reviewed.  Patient does not have any splenic laceration or solid organ injury noted.  He does have nondisplaced fractures of the 9th and 10th ribs.  He had not incidental renal mass noted.  He was informed of this and instructed to follow-up with his urologist (Dr. Morales) regarding this finding.  No other traumatic injuries are noted on examination.    Pain is improved after Lidoderm patch, he is requesting some Tylenol which is ordered for him.  He does not want anything stronger than Tylenol for home.  At this time I feel he is appropriate for discharge and outpatient management.  He is given walker to help with ambulation.  He is given prescription for Lidoderm patches and instructed to take Tylenol at home.  He is to return if he is unable to tolerate the pain or with any increased shortness of breath or any new or worsening symptoms.  Patient understands and agrees stated plan.    Amount and/or Complexity of Data Reviewed  Labs: ordered.  Radiology: ordered and independent  interpretation performed.  ECG/medicine tests: ordered and independent interpretation performed.        Procedure  Procedures     Carlos Alberto Coon,   11/21/23 1714

## 2023-11-21 NOTE — ED TRIAGE NOTES
Pt to ED after falling yesterday from standing to floor, pt states he feels weaker than normal. Pt c/o left sided rib pain. + pt is on Plavix since after his bypass surgery. -LOC, - head injury or pain.

## 2023-11-21 NOTE — DISCHARGE INSTRUCTIONS
Return to the ED if unable to tolerate the pain or with shortness of breath or with any new or worsening symptoms.

## 2023-11-30 ENCOUNTER — HOSPITAL ENCOUNTER (OUTPATIENT)
Dept: CARDIOLOGY | Facility: HOSPITAL | Age: 78
Discharge: HOME | End: 2023-11-30
Payer: MEDICARE

## 2023-11-30 ENCOUNTER — TELEMEDICINE (OUTPATIENT)
Dept: PRIMARY CARE | Facility: CLINIC | Age: 78
End: 2023-11-30
Payer: MEDICARE

## 2023-11-30 DIAGNOSIS — Z79.4 DIABETES MELLITUS TREATED WITH INSULIN AND ORAL MEDICATION (MULTI): ICD-10-CM

## 2023-11-30 DIAGNOSIS — I10 ESSENTIAL HYPERTENSION: ICD-10-CM

## 2023-11-30 DIAGNOSIS — S22.42XA CLOSED FRACTURE OF MULTIPLE RIBS OF LEFT SIDE, INITIAL ENCOUNTER: ICD-10-CM

## 2023-11-30 DIAGNOSIS — E11.9 DIABETES MELLITUS TREATED WITH INSULIN AND ORAL MEDICATION (MULTI): ICD-10-CM

## 2023-11-30 DIAGNOSIS — J06.9 VIRAL UPPER RESPIRATORY TRACT INFECTION: Primary | ICD-10-CM

## 2023-11-30 DIAGNOSIS — I25.10 CORONARY ARTERY DISEASE INVOLVING NATIVE CORONARY ARTERY OF NATIVE HEART, UNSPECIFIED WHETHER ANGINA PRESENT: ICD-10-CM

## 2023-11-30 DIAGNOSIS — Z79.84 DIABETES MELLITUS TREATED WITH INSULIN AND ORAL MEDICATION (MULTI): ICD-10-CM

## 2023-11-30 DIAGNOSIS — Z87.891 FORMER SMOKER: ICD-10-CM

## 2023-11-30 LAB
ATRIAL RATE: 92 BPM
P AXIS: 48 DEGREES
P OFFSET: 197 MS
P ONSET: 138 MS
PR INTERVAL: 124 MS
Q ONSET: 200 MS
QRS COUNT: 16 BEATS
QRS DURATION: 126 MS
QT INTERVAL: 366 MS
QTC CALCULATION(BAZETT): 452 MS
QTC FREDERICIA: 422 MS
R AXIS: 50 DEGREES
T AXIS: 42 DEGREES
T OFFSET: 383 MS
VENTRICULAR RATE: 92 BPM

## 2023-11-30 PROCEDURE — 93005 ELECTROCARDIOGRAM TRACING: CPT

## 2023-11-30 PROCEDURE — 99442 PR PHYS/QHP TELEPHONE EVALUATION 11-20 MIN: CPT | Performed by: FAMILY MEDICINE

## 2023-11-30 RX ORDER — LISINOPRIL 30 MG/1
30 TABLET ORAL DAILY
Qty: 90 TABLET | Refills: 1 | Status: SHIPPED | OUTPATIENT
Start: 2023-11-30 | End: 2024-06-03

## 2023-11-30 RX ORDER — LIDOCAINE 560 MG/1
1 PATCH PERCUTANEOUS; TOPICAL; TRANSDERMAL DAILY
Qty: 10 PATCH | Refills: 1 | Status: SHIPPED | OUTPATIENT
Start: 2023-11-30

## 2023-11-30 RX ORDER — GLIMEPIRIDE 4 MG/1
4 TABLET ORAL 2 TIMES DAILY
Qty: 180 TABLET | Refills: 1 | Status: SHIPPED | OUTPATIENT
Start: 2023-11-30

## 2023-11-30 RX ORDER — CLOPIDOGREL BISULFATE 75 MG/1
75 TABLET ORAL DAILY
Qty: 90 TABLET | Refills: 1 | Status: SHIPPED | OUTPATIENT
Start: 2023-11-30 | End: 2024-01-25 | Stop reason: ALTCHOICE

## 2023-11-30 ASSESSMENT — ENCOUNTER SYMPTOMS
MUSCULOSKELETAL NEGATIVE: 1
PSYCHIATRIC NEGATIVE: 1
GASTROINTESTINAL NEGATIVE: 1
COUGH: 1
HEMATOLOGIC/LYMPHATIC NEGATIVE: 1
ALLERGIC/IMMUNOLOGIC NEGATIVE: 1
CARDIOVASCULAR NEGATIVE: 1
EYES NEGATIVE: 1
ENDOCRINE NEGATIVE: 1
FATIGUE: 1

## 2023-11-30 NOTE — PROGRESS NOTES
"Subjective Javid and I participated in a telephone visit regarding coughing and rib fractures.  The patient began to have a cough about 2 weeks ago.  The cough has been productive of clear sputum.  Along with this he had a sore throat but no nasal congestion fever or worsening shortness of breath.  At this point the cough and sore throat are much improved.  He did not test for COVID.  About 10 days ago the patient fell down a step in his home and hit his chest on a stool.  2 days later he went to [facility] and CT of the chest and abdomen did show nondisplaced fractures of the anterior left ninth and 10th ribs.  At this point patient states the pain is worse with motion but overall improving.  He denies any other type of injury.  The last week or 2 the patient has not been eating a good diet and his blood sugars have been running around 300.  He also has been off of his glimepiride for several days.  He continues on his other meds noted    Patient ID: Chas Melgar is a 78 y.o. male who presents for Cough (Cough non-productive 2-4 weeks- had been \"sticky\"; no fever:  did not test for Covid:), Fall (11/19 left rib fracture:  [facility];), Hyperglycemia (Dr. Jeter is requesting PT resume metformin d/t increased urination d/t blood sugar control), and Med Refill (Lisinopril/Clopidogrel/Glimepiride/Lidocaine patches):    Problem List Items Addressed This Visit       Coronary artery disease involving native coronary artery of native heart    Relevant Medications    clopidogrel (Plavix) 75 mg tablet    Essential hypertension    Relevant Medications    lisinopril 30 mg tablet    Mixed hyperlipidemia    Diabetes mellitus treated with insulin and oral medication (CMS/[facility])    Relevant Medications    glimepiride (Amaryl) 4 mg tablet    Former smoker     Other Visit Diagnoses       Closed fracture of multiple ribs of left side, initial encounter        Relevant Medications    lidocaine 4 % patch      "      Past Medical History:   Diagnosis Date    Acute gastric ulcer with hemorrhage 09/07/2022    Acute gastric ulcer with bleeding    Acute upper respiratory infection, unspecified     URI, acute    Arthritis     Body mass index (BMI) 28.0-28.9, adult 10/19/2021    BMI 28.0-28.9,adult    Cancer (CMS/[facility])     Coronary artery disease     Diabetes mellitus (CMS/[facility])     Encounter for other preprocedural examination 10/14/2021    Pre-operative clearance    Encounter for screening for malignant neoplasm of prostate     Encounter for prostate cancer screening    Impingement syndrome of unspecified shoulder 07/22/2021    Impingement syndrome of shoulder region, unspecified laterality    Other abnormalities of breathing     Difficulty breathing    Other specified disorders of pancreatic internal secretion     Other specified disorders of pancreatic internal secretion    Other specified follicular disorders     Actinic folliculitis    Other specified postprocedural states 06/09/2021    Status post arthroscopy of left shoulder    Overweight 02/21/2022    Overweight with body mass index (BMI) of 29 to 29.9 in adult    Overweight 01/18/2022    Overweight with body mass index (BMI) of 28 to 28.9 in adult    Pain in right hip     Hip pain, right    Pain in right leg     Pain of right lower extremity    Pain in unspecified hip 08/04/2021    Hip joint pain    Pain in unspecified shoulder 08/10/2021    Shoulder pain    Personal history of malignant neoplasm, unspecified 08/04/2021    History of malignant neoplasm    Personal history of other diseases of male genital organs     History of benign prostatic hyperplasia    Personal history of other diseases of the circulatory system 08/04/2021    History of hypertension    Personal history of other diseases of the circulatory system 08/04/2021    History of cardiac disorder    Personal history of other diseases of the digestive system 10/14/2021    History of gastrointestinal hemorrhage     Personal history of other diseases of the digestive system 02/26/2022    History of right inguinal hernia    Personal history of other diseases of the digestive system     History of fatty infiltration of liver    Personal history of other diseases of the musculoskeletal system and connective tissue 08/04/2021    History of arthritis    Personal history of other diseases of the musculoskeletal system and connective tissue 08/05/2021    History of rotator cuff tear    Personal history of other diseases of the respiratory system 11/17/2021    History of upper respiratory infection    Personal history of other diseases of the respiratory system     History of acute bronchitis    Personal history of other diseases of the respiratory system     History of acute pharyngitis    Personal history of other diseases of the respiratory system     History of acute sinusitis    Personal history of other diseases of urinary system 09/01/2022    History of renal insufficiency syndrome    Personal history of other endocrine, nutritional and metabolic disease 10/06/2021    History of diabetes mellitus    Personal history of other endocrine, nutritional and metabolic disease 01/18/2022    History of hyperkalemia    Personal history of other malignant neoplasm of skin     History of other malignant neoplasm of skin    Personal history of other specified conditions 09/01/2022    History of chest pain    Personal history of other specified conditions     History of dysuria    Personal history of other specified conditions     History of chest pain    Persons encountering health services in other specified circumstances     Encounter for support and coordination of transition of care    Primary osteoarthritis, 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    DJD of left shoulder    Right lower quadrant pain 02/26/2022    Right inguinal pain    Strain of muscle, fascia and tendon of lower back, initial encounter     Lumbar strain    Strain of muscle, fascia  and tendon of other parts of biceps, right arm, initial encounter 10/14/2021    Rupture of right biceps tendon, initial encounter    Trochanteric bursitis, left hip 08/10/2021    Trochanteric bursitis of left hip      Past Surgical History:   Procedure Laterality Date    CT AORTA AND BILATERAL ILIOFEMORAL RUNOFF ANGIOGRAM W AND/OR WO IV CONTRAST  10/27/2020    CT AORTA AND BILATERAL ILIOFEMORAL RUNOFF ANGIOGRAM W AND/OR WO IV CONTRAST 10/27/2020 [facility]    OTHER SURGICAL HISTORY  2022    Colonoscopy    OTHER SURGICAL HISTORY  10/14/2021    Cardiac catheterization with stent placement    OTHER SURGICAL HISTORY  10/14/2021    PTA femoral artery    OTHER SURGICAL HISTORY  10/14/2021    Rotator cuff repair    OTHER SURGICAL HISTORY  10/14/2021    Inguinal hernia repair    OTHER SURGICAL HISTORY  10/14/2021    Knee arthroscopy    OTHER SURGICAL HISTORY  2021    Skin biopsy    ROTATOR CUFF REPAIR        Family History   Problem Relation Name Age of Onset    Coronary artery disease Father      Hypertension Other      Alzheimer's disease Other      Diabetes Mother Melinda andino       Social History     Socioeconomic History    Marital status:      Spouse name: Not on file    Number of children: Not on file    Years of education: Not on file    Highest education level: Not on file   Occupational History    Not on file   Tobacco Use    Smoking status: Former     Types: Cigarettes     Quit date: 1986     Years since quittin.9    Smokeless tobacco: Never   Substance and Sexual Activity    Alcohol use: Yes     Alcohol/week: 2.0 standard drinks of alcohol     Types: 2 Standard drinks or equivalent per week    Drug use: Never    Sexual activity: Not Currently     Partners: Female   Other Topics Concern    Not on file   Social History Narrative    Not on file     Social Determinants of Health     Financial Resource Strain: Not on file   Food Insecurity: Not on file   Transportation Needs:  "Not on file   Physical Activity: Not on file   Stress: Not on file   Social Connections: Not on file   Intimate Partner Violence: Not on file   Housing Stability: Not on file      Aluminum and [employer]el   Current Outpatient Medications   Medication Sig Dispense Refill    aspirin 81 mg EC tablet Take 1 tablet (81 mg) by mouth once daily.      cyanocobalamin (Vitamin B-12) 1,000 mcg tablet Take 2 tablets (2,000 mcg) by mouth once daily.      dilTIAZem CD (Cardizem CD) 240 mg 24 hr capsule Take 1 capsule (240 mg) by mouth once daily.      ergocalciferol (Vitamin D-2) 1.25 MG (25556 UT) capsule Take 1 capsule (1,250 mcg) by mouth every 14 (fourteen) days.      Farxiga 10 mg TAKE 1 TABLET BY MOUTH EVERY MORNING 90 tablet 1    folic acid 0.8 mg capsule Take 1 tablet by mouth once daily at bedtime.      hydroCHLOROthiazide (HYDRODiuril) 25 mg tablet TAKE 1 TABLET BY MOUTH DAILY 90 tablet 0    insulin detemir (Levemir FlexPen) 100 unit/mL (3 mL) pen Inject 15 Units under the skin once daily at bedtime.      isosorbide mononitrate ER (Imdur) 30 mg 24 hr tablet Take 1 tablet (30 mg) by mouth once daily. 90 tablet 3    luspatercept-aamt (ReblozyL) 25 mg recon soln Inject under the skin. As directed      pantoprazole (ProtoNix) 40 mg EC tablet Take 1 tablet (40 mg) by mouth once daily.      simvastatin (Zocor) 20 mg tablet Take 1 tablet (20 mg) by mouth once daily at bedtime. 90 tablet 3    tamsulosin (Flomax) 0.4 mg 24 hr capsule Take 1 capsule (0.4 mg) by mouth once daily.      BD Ultra-Fine Short Pen Needle 31 gauge x 5/16\" needle USE ONCE DAILY WITH LEVEMIR      clopidogrel (Plavix) 75 mg tablet Take 1 tablet (75 mg) by mouth once daily. 90 tablet 1    glimepiride (Amaryl) 4 mg tablet Take 1 tablet (4 mg) by mouth 2 times a day. 180 tablet 1    lidocaine 4 % patch Place 1 patch over 12 hours on the skin once daily. Remove & discard patch within 12 hours or as directed by MD. 10 patch 1    lisinopril 30 mg tablet Take 1 tablet " (30 mg) by mouth once daily. 90 tablet 1     No current facility-administered medications for this visit.       Immunization History   Administered Date(s) Administered    Flu vaccine, quadrivalent, high-dose, preservative free, age 65y+ (FLUZONE) 10/28/2020, 11/10/2023    Hep A / Hep B 05/08/2014, 06/16/2014, 11/12/2014    Influenza, High Dose Seasonal, Preservative Free 11/16/2017, 09/20/2018, 11/29/2019    Influenza, Unspecified 10/01/2011, 10/03/2012, 10/01/2013, 10/01/2014, 10/01/2015, 10/14/2016    Pfizer Purple Cap SARS-CoV-2 02/27/2021, 03/20/2021, 01/22/2022    Pneumococcal conjugate vaccine, 13-valent (PREVNAR 13) 11/30/2016    Pneumococcal polysaccharide vaccine, 23-valent, age 2 years and older (PNEUMOVAX 23) 06/19/2011        Review of Systems   Constitutional:  Positive for fatigue.   HENT: Negative.     Eyes: Negative.    Respiratory:  Positive for cough.    Cardiovascular: Negative.    Gastrointestinal: Negative.    Endocrine: Negative.    Genitourinary: Negative.    Musculoskeletal: Negative.    Skin: Negative.    Allergic/Immunologic: Negative.    Hematological: Negative.    Psychiatric/Behavioral: Negative.     All other systems reviewed and are negative.       There were no vitals filed for this visit.  There were no vitals filed for this visit.   Physical Exam this was a telephone visit.  The patient is alert and does not sound to be in any type of respiratory distress.    ASSESSMENT/PLAN: URI with bronchitis resolving  Falling episode with left anterior ninth and 10th rib fractures  Diabetes mellitus type 2    Plan-continue current meds.  Resume glimepiride 4 mg twice daily as soon as possible.  Do not restart metformin.  We discussed potential risks of using metformin with chronic kidney disease.  We discussed the importance of fall prevention  Rest and apply ice to chest area as needed.  Avoid any heavy lifting or straining  May continue Robitussin DM as needed for cough.  He is encouraged to  follow his diabetic diet.  Go to ER for any worsening cough or shortness of breath  Schedule follow-up office visit in 1 week and call as needed

## 2023-12-04 ENCOUNTER — LAB (OUTPATIENT)
Dept: LAB | Facility: CLINIC | Age: 78
End: 2023-12-04
Payer: MEDICARE

## 2023-12-04 ENCOUNTER — INFUSION (OUTPATIENT)
Dept: HEMATOLOGY/ONCOLOGY | Facility: CLINIC | Age: 78
End: 2023-12-04
Payer: MEDICARE

## 2023-12-04 VITALS
SYSTOLIC BLOOD PRESSURE: 114 MMHG | BODY MASS INDEX: 26.08 KG/M2 | TEMPERATURE: 97.3 F | HEART RATE: 88 BPM | DIASTOLIC BLOOD PRESSURE: 68 MMHG | OXYGEN SATURATION: 99 % | WEIGHT: 161.6 LBS | RESPIRATION RATE: 18 BRPM

## 2023-12-04 DIAGNOSIS — D46.1 REFRACTORY ANEMIA WITH RING SIDEROBLASTS (MULTI): ICD-10-CM

## 2023-12-04 DIAGNOSIS — D46.Z MDS (MYELODYSPLASTIC SYNDROME), LOW GRADE (MULTI): ICD-10-CM

## 2023-12-04 LAB
ALBUMIN SERPL BCP-MCNC: 4.2 G/DL (ref 3.4–5)
ALP SERPL-CCNC: 102 U/L (ref 33–136)
ALT SERPL W P-5'-P-CCNC: 8 U/L (ref 10–52)
ANION GAP SERPL CALC-SCNC: 15 MMOL/L (ref 10–20)
AST SERPL W P-5'-P-CCNC: 8 U/L (ref 9–39)
BASO STIPL BLD QL SMEAR: PRESENT
BASOPHILS # BLD AUTO: 0.05 X10*3/UL (ref 0–0.1)
BASOPHILS NFR BLD AUTO: 0.8 %
BILIRUB SERPL-MCNC: 1 MG/DL (ref 0–1.2)
BUN SERPL-MCNC: 41 MG/DL (ref 6–23)
CALCIUM SERPL-MCNC: 9.6 MG/DL (ref 8.6–10.3)
CHLORIDE SERPL-SCNC: 100 MMOL/L (ref 98–107)
CO2 SERPL-SCNC: 25 MMOL/L (ref 21–32)
CREAT SERPL-MCNC: 2.1 MG/DL (ref 0.5–1.3)
EOSINOPHIL # BLD AUTO: 0.54 X10*3/UL (ref 0–0.4)
EOSINOPHIL NFR BLD AUTO: 8.6 %
ERYTHROCYTE [DISTWIDTH] IN BLOOD BY AUTOMATED COUNT: 22.3 % (ref 11.5–14.5)
GFR SERPL CREATININE-BSD FRML MDRD: 31 ML/MIN/1.73M*2
GLUCOSE SERPL-MCNC: 213 MG/DL (ref 74–99)
HCT VFR BLD AUTO: 30.4 % (ref 41–52)
HGB BLD-MCNC: 10 G/DL (ref 13.5–17.5)
IMM GRANULOCYTES # BLD AUTO: 0.01 X10*3/UL (ref 0–0.5)
IMM GRANULOCYTES NFR BLD AUTO: 0.2 % (ref 0–0.9)
LYMPHOCYTES # BLD AUTO: 1.84 X10*3/UL (ref 0.8–3)
LYMPHOCYTES NFR BLD AUTO: 29.3 %
MCH RBC QN AUTO: 37.9 PG (ref 26–34)
MCHC RBC AUTO-ENTMCNC: 32.9 G/DL (ref 32–36)
MCV RBC AUTO: 115 FL (ref 80–100)
MONOCYTES # BLD AUTO: 0.51 X10*3/UL (ref 0.05–0.8)
MONOCYTES NFR BLD AUTO: 8.1 %
NEUTROPHILS # BLD AUTO: 3.33 X10*3/UL (ref 1.6–5.5)
NEUTROPHILS NFR BLD AUTO: 53 %
NRBC BLD-RTO: ABNORMAL /100{WBCS}
OVALOCYTES BLD QL SMEAR: NORMAL
PLATELET # BLD AUTO: 167 X10*3/UL (ref 150–450)
POLYCHROMASIA BLD QL SMEAR: NORMAL
POTASSIUM SERPL-SCNC: 4.2 MMOL/L (ref 3.5–5.3)
PROT SERPL-MCNC: 6.9 G/DL (ref 6.4–8.2)
RBC # BLD AUTO: 2.64 X10*6/UL (ref 4.5–5.9)
RBC MORPH BLD: NORMAL
SODIUM SERPL-SCNC: 136 MMOL/L (ref 136–145)
WBC # BLD AUTO: 6.3 X10*3/UL (ref 4.4–11.3)

## 2023-12-04 PROCEDURE — 2500000004 HC RX 250 GENERAL PHARMACY W/ HCPCS (ALT 636 FOR OP/ED): Mod: JZ | Performed by: INTERNAL MEDICINE

## 2023-12-04 PROCEDURE — 96372 THER/PROPH/DIAG INJ SC/IM: CPT

## 2023-12-04 PROCEDURE — 80053 COMPREHEN METABOLIC PANEL: CPT

## 2023-12-04 PROCEDURE — 36415 COLL VENOUS BLD VENIPUNCTURE: CPT

## 2023-12-04 PROCEDURE — 85025 COMPLETE CBC W/AUTO DIFF WBC: CPT

## 2023-12-04 RX ORDER — ALBUTEROL SULFATE 0.83 MG/ML
3 SOLUTION RESPIRATORY (INHALATION) AS NEEDED
Status: CANCELLED | OUTPATIENT
Start: 2023-12-25

## 2023-12-04 RX ORDER — EPINEPHRINE 0.3 MG/.3ML
0.3 INJECTION SUBCUTANEOUS EVERY 5 MIN PRN
Status: CANCELLED | OUTPATIENT
Start: 2023-12-25

## 2023-12-04 RX ORDER — FAMOTIDINE 10 MG/ML
20 INJECTION INTRAVENOUS ONCE AS NEEDED
Status: CANCELLED | OUTPATIENT
Start: 2023-12-25

## 2023-12-04 RX ORDER — DIPHENHYDRAMINE HYDROCHLORIDE 50 MG/ML
50 INJECTION INTRAMUSCULAR; INTRAVENOUS AS NEEDED
Status: CANCELLED | OUTPATIENT
Start: 2023-12-25

## 2023-12-04 RX ADMIN — LUSPATERCEPT 100 MG: 75 INJECTION, POWDER, LYOPHILIZED, FOR SOLUTION SUBCUTANEOUS at 11:23

## 2023-12-04 ASSESSMENT — PAIN SCALES - GENERAL: PAINLEVEL: 4

## 2023-12-08 ENCOUNTER — APPOINTMENT (OUTPATIENT)
Dept: PRIMARY CARE | Facility: CLINIC | Age: 78
End: 2023-12-08
Payer: MEDICARE

## 2023-12-16 DIAGNOSIS — I10 ESSENTIAL HYPERTENSION: Primary | ICD-10-CM

## 2023-12-18 ENCOUNTER — APPOINTMENT (OUTPATIENT)
Dept: PRIMARY CARE | Facility: CLINIC | Age: 78
End: 2023-12-18
Payer: MEDICARE

## 2023-12-19 ENCOUNTER — APPOINTMENT (OUTPATIENT)
Dept: PRIMARY CARE | Facility: CLINIC | Age: 78
End: 2023-12-19
Payer: MEDICARE

## 2023-12-21 RX ORDER — DILTIAZEM HYDROCHLORIDE 240 MG/1
240 CAPSULE, COATED, EXTENDED RELEASE ORAL DAILY
Qty: 90 CAPSULE | Refills: 3 | Status: SHIPPED | OUTPATIENT
Start: 2023-12-21

## 2023-12-22 ENCOUNTER — LAB (OUTPATIENT)
Dept: LAB | Facility: CLINIC | Age: 78
End: 2023-12-22
Payer: MEDICARE

## 2023-12-22 ENCOUNTER — INFUSION (OUTPATIENT)
Dept: HEMATOLOGY/ONCOLOGY | Facility: CLINIC | Age: 78
End: 2023-12-22
Payer: MEDICARE

## 2023-12-22 VITALS
BODY MASS INDEX: 26.69 KG/M2 | RESPIRATION RATE: 16 BRPM | SYSTOLIC BLOOD PRESSURE: 130 MMHG | OXYGEN SATURATION: 98 % | TEMPERATURE: 96.8 F | WEIGHT: 165.34 LBS | DIASTOLIC BLOOD PRESSURE: 68 MMHG | HEART RATE: 83 BPM

## 2023-12-22 DIAGNOSIS — D46.1 REFRACTORY ANEMIA WITH RING SIDEROBLASTS (MULTI): ICD-10-CM

## 2023-12-22 DIAGNOSIS — Z79.4 DIABETES MELLITUS TREATED WITH INSULIN AND ORAL MEDICATION (MULTI): ICD-10-CM

## 2023-12-22 DIAGNOSIS — I10 PRIMARY HYPERTENSION: ICD-10-CM

## 2023-12-22 DIAGNOSIS — Z79.84 DIABETES MELLITUS TREATED WITH INSULIN AND ORAL MEDICATION (MULTI): ICD-10-CM

## 2023-12-22 DIAGNOSIS — D46.Z MDS (MYELODYSPLASTIC SYNDROME), LOW GRADE (MULTI): ICD-10-CM

## 2023-12-22 DIAGNOSIS — E11.9 DIABETES MELLITUS TREATED WITH INSULIN AND ORAL MEDICATION (MULTI): ICD-10-CM

## 2023-12-22 LAB
ALBUMIN SERPL BCP-MCNC: 4.7 G/DL (ref 3.4–5)
ALP SERPL-CCNC: 81 U/L (ref 33–136)
ALT SERPL W P-5'-P-CCNC: 11 U/L (ref 10–52)
ANION GAP SERPL CALC-SCNC: 16 MMOL/L (ref 10–20)
AST SERPL W P-5'-P-CCNC: 10 U/L (ref 9–39)
BASOPHILS # BLD AUTO: 0.07 X10*3/UL (ref 0–0.1)
BASOPHILS NFR BLD AUTO: 1.4 %
BILIRUB SERPL-MCNC: 1.3 MG/DL (ref 0–1.2)
BUN SERPL-MCNC: 26 MG/DL (ref 6–23)
CALCIUM SERPL-MCNC: 9.3 MG/DL (ref 8.6–10.3)
CHLORIDE SERPL-SCNC: 102 MMOL/L (ref 98–107)
CHOLEST SERPL-MCNC: 88 MG/DL (ref 0–199)
CHOLESTEROL/HDL RATIO: 2.1
CO2 SERPL-SCNC: 27 MMOL/L (ref 21–32)
CREAT SERPL-MCNC: 1.64 MG/DL (ref 0.5–1.3)
EOSINOPHIL # BLD AUTO: 0.46 X10*3/UL (ref 0–0.4)
EOSINOPHIL NFR BLD AUTO: 9.2 %
ERYTHROCYTE [DISTWIDTH] IN BLOOD BY AUTOMATED COUNT: 20.6 % (ref 11.5–14.5)
EST. AVERAGE GLUCOSE BLD GHB EST-MCNC: 183 MG/DL
GFR SERPL CREATININE-BSD FRML MDRD: 42 ML/MIN/1.73M*2
GLUCOSE SERPL-MCNC: 199 MG/DL (ref 74–99)
HBA1C MFR BLD: 8 %
HCT VFR BLD AUTO: 29.2 % (ref 41–52)
HDLC SERPL-MCNC: 41.5 MG/DL
HGB BLD-MCNC: 9.8 G/DL (ref 13.5–17.5)
HYPOCHROMIA BLD QL SMEAR: NORMAL
IMM GRANULOCYTES # BLD AUTO: 0.01 X10*3/UL (ref 0–0.5)
IMM GRANULOCYTES NFR BLD AUTO: 0.2 % (ref 0–0.9)
LDLC SERPL CALC-MCNC: 33 MG/DL
LYMPHOCYTES # BLD AUTO: 1.57 X10*3/UL (ref 0.8–3)
LYMPHOCYTES NFR BLD AUTO: 31.3 %
MCH RBC QN AUTO: 38.1 PG (ref 26–34)
MCHC RBC AUTO-ENTMCNC: 33.6 G/DL (ref 32–36)
MCV RBC AUTO: 114 FL (ref 80–100)
MONOCYTES # BLD AUTO: 0.47 X10*3/UL (ref 0.05–0.8)
MONOCYTES NFR BLD AUTO: 9.4 %
NEUTROPHILS # BLD AUTO: 2.43 X10*3/UL (ref 1.6–5.5)
NEUTROPHILS NFR BLD AUTO: 48.5 %
NON HDL CHOLESTEROL: 47 MG/DL (ref 0–149)
NRBC BLD-RTO: ABNORMAL /100{WBCS}
OVALOCYTES BLD QL SMEAR: NORMAL
PLATELET # BLD AUTO: 150 X10*3/UL (ref 150–450)
POLYCHROMASIA BLD QL SMEAR: NORMAL
POTASSIUM SERPL-SCNC: 4.1 MMOL/L (ref 3.5–5.3)
PROT SERPL-MCNC: 6.8 G/DL (ref 6.4–8.2)
RBC # BLD AUTO: 2.57 X10*6/UL (ref 4.5–5.9)
RBC MORPH BLD: NORMAL
SODIUM SERPL-SCNC: 141 MMOL/L (ref 136–145)
TRIGL SERPL-MCNC: 66 MG/DL (ref 0–149)
TSH SERPL-ACNC: 2.02 MIU/L (ref 0.44–3.98)
VLDL: 13 MG/DL (ref 0–40)
WBC # BLD AUTO: 5 X10*3/UL (ref 4.4–11.3)

## 2023-12-22 PROCEDURE — 85025 COMPLETE CBC W/AUTO DIFF WBC: CPT

## 2023-12-22 PROCEDURE — 80053 COMPREHEN METABOLIC PANEL: CPT

## 2023-12-22 PROCEDURE — 2500000004 HC RX 250 GENERAL PHARMACY W/ HCPCS (ALT 636 FOR OP/ED): Mod: JZ | Performed by: INTERNAL MEDICINE

## 2023-12-22 PROCEDURE — 84443 ASSAY THYROID STIM HORMONE: CPT | Performed by: FAMILY MEDICINE

## 2023-12-22 PROCEDURE — 83036 HEMOGLOBIN GLYCOSYLATED A1C: CPT | Performed by: FAMILY MEDICINE

## 2023-12-22 PROCEDURE — 36415 COLL VENOUS BLD VENIPUNCTURE: CPT

## 2023-12-22 PROCEDURE — 80061 LIPID PANEL: CPT | Performed by: FAMILY MEDICINE

## 2023-12-22 PROCEDURE — 96372 THER/PROPH/DIAG INJ SC/IM: CPT

## 2023-12-22 RX ORDER — EPINEPHRINE 0.3 MG/.3ML
0.3 INJECTION SUBCUTANEOUS EVERY 5 MIN PRN
Status: DISCONTINUED | OUTPATIENT
Start: 2023-12-22 | End: 2023-12-22 | Stop reason: HOSPADM

## 2023-12-22 RX ORDER — DIPHENHYDRAMINE HYDROCHLORIDE 50 MG/ML
50 INJECTION INTRAMUSCULAR; INTRAVENOUS AS NEEDED
Status: DISCONTINUED | OUTPATIENT
Start: 2023-12-22 | End: 2023-12-22 | Stop reason: HOSPADM

## 2023-12-22 RX ORDER — EPINEPHRINE 0.3 MG/.3ML
0.3 INJECTION SUBCUTANEOUS EVERY 5 MIN PRN
Status: CANCELLED | OUTPATIENT
Start: 2023-12-25

## 2023-12-22 RX ORDER — FAMOTIDINE 10 MG/ML
20 INJECTION INTRAVENOUS ONCE AS NEEDED
Status: DISCONTINUED | OUTPATIENT
Start: 2023-12-22 | End: 2023-12-22 | Stop reason: HOSPADM

## 2023-12-22 RX ORDER — ALBUTEROL SULFATE 0.83 MG/ML
3 SOLUTION RESPIRATORY (INHALATION) AS NEEDED
Status: DISCONTINUED | OUTPATIENT
Start: 2023-12-22 | End: 2023-12-22 | Stop reason: HOSPADM

## 2023-12-22 RX ORDER — ALBUTEROL SULFATE 0.83 MG/ML
3 SOLUTION RESPIRATORY (INHALATION) AS NEEDED
Status: CANCELLED | OUTPATIENT
Start: 2023-12-25

## 2023-12-22 RX ORDER — DIPHENHYDRAMINE HYDROCHLORIDE 50 MG/ML
50 INJECTION INTRAMUSCULAR; INTRAVENOUS AS NEEDED
Status: CANCELLED | OUTPATIENT
Start: 2023-12-25

## 2023-12-22 RX ORDER — FAMOTIDINE 10 MG/ML
20 INJECTION INTRAVENOUS ONCE AS NEEDED
Status: CANCELLED | OUTPATIENT
Start: 2023-12-25

## 2023-12-22 RX ADMIN — LUSPATERCEPT 100 MG: 75 INJECTION, POWDER, LYOPHILIZED, FOR SOLUTION SUBCUTANEOUS at 09:45

## 2023-12-22 ASSESSMENT — PAIN SCALES - GENERAL: PAINLEVEL: 0-NO PAIN

## 2024-01-05 ENCOUNTER — TELEPHONE (OUTPATIENT)
Dept: ENDOCRINOLOGY | Facility: CLINIC | Age: 79
End: 2024-01-05

## 2024-01-15 ENCOUNTER — INFUSION (OUTPATIENT)
Dept: HEMATOLOGY/ONCOLOGY | Facility: CLINIC | Age: 79
End: 2024-01-15
Payer: MEDICARE

## 2024-01-15 ENCOUNTER — LAB (OUTPATIENT)
Dept: LAB | Facility: CLINIC | Age: 79
End: 2024-01-15
Payer: MEDICARE

## 2024-01-15 VITALS
DIASTOLIC BLOOD PRESSURE: 66 MMHG | OXYGEN SATURATION: 98 % | WEIGHT: 167.33 LBS | BODY MASS INDEX: 27.01 KG/M2 | RESPIRATION RATE: 16 BRPM | SYSTOLIC BLOOD PRESSURE: 136 MMHG | HEART RATE: 94 BPM | TEMPERATURE: 96.8 F

## 2024-01-15 DIAGNOSIS — D46.Z MDS (MYELODYSPLASTIC SYNDROME), LOW GRADE (MULTI): ICD-10-CM

## 2024-01-15 DIAGNOSIS — D46.1 REFRACTORY ANEMIA WITH RING SIDEROBLASTS (MULTI): ICD-10-CM

## 2024-01-15 LAB
ALBUMIN SERPL BCP-MCNC: 4.4 G/DL (ref 3.4–5)
ALP SERPL-CCNC: 85 U/L (ref 33–136)
ALT SERPL W P-5'-P-CCNC: 13 U/L (ref 10–52)
ANION GAP SERPL CALC-SCNC: 15 MMOL/L (ref 10–20)
AST SERPL W P-5'-P-CCNC: 12 U/L (ref 9–39)
BASOPHILS # BLD AUTO: 0.1 X10*3/UL (ref 0–0.1)
BASOPHILS NFR BLD AUTO: 1.5 %
BILIRUB SERPL-MCNC: 1.1 MG/DL (ref 0–1.2)
BUN SERPL-MCNC: 31 MG/DL (ref 6–23)
CALCIUM SERPL-MCNC: 9.7 MG/DL (ref 8.6–10.3)
CHLORIDE SERPL-SCNC: 101 MMOL/L (ref 98–107)
CO2 SERPL-SCNC: 25 MMOL/L (ref 21–32)
CREAT SERPL-MCNC: 1.7 MG/DL (ref 0.5–1.3)
DACRYOCYTES BLD QL SMEAR: NORMAL
EGFRCR SERPLBLD CKD-EPI 2021: 41 ML/MIN/1.73M*2
EOSINOPHIL # BLD AUTO: 0.62 X10*3/UL (ref 0–0.4)
EOSINOPHIL NFR BLD AUTO: 9.5 %
ERYTHROCYTE [DISTWIDTH] IN BLOOD BY AUTOMATED COUNT: 19.9 % (ref 11.5–14.5)
GIANT PLATELETS BLD QL SMEAR: NORMAL
GLUCOSE SERPL-MCNC: 264 MG/DL (ref 74–99)
HCT VFR BLD AUTO: 30.2 % (ref 41–52)
HGB BLD-MCNC: 10.1 G/DL (ref 13.5–17.5)
IMM GRANULOCYTES # BLD AUTO: 0.02 X10*3/UL (ref 0–0.5)
IMM GRANULOCYTES NFR BLD AUTO: 0.3 % (ref 0–0.9)
LYMPHOCYTES # BLD AUTO: 1.97 X10*3/UL (ref 0.8–3)
LYMPHOCYTES NFR BLD AUTO: 30.3 %
MCH RBC QN AUTO: 37.8 PG (ref 26–34)
MCHC RBC AUTO-ENTMCNC: 33.4 G/DL (ref 32–36)
MCV RBC AUTO: 113 FL (ref 80–100)
MONOCYTES # BLD AUTO: 0.63 X10*3/UL (ref 0.05–0.8)
MONOCYTES NFR BLD AUTO: 9.7 %
NEUTROPHILS # BLD AUTO: 3.17 X10*3/UL (ref 1.6–5.5)
NEUTROPHILS NFR BLD AUTO: 48.7 %
NRBC BLD-RTO: ABNORMAL /100{WBCS}
OVALOCYTES BLD QL SMEAR: NORMAL
PLATELET # BLD AUTO: 173 X10*3/UL (ref 150–450)
POLYCHROMASIA BLD QL SMEAR: NORMAL
POTASSIUM SERPL-SCNC: 4.6 MMOL/L (ref 3.5–5.3)
PROT SERPL-MCNC: 6.8 G/DL (ref 6.4–8.2)
RBC # BLD AUTO: 2.67 X10*6/UL (ref 4.5–5.9)
RBC MORPH BLD: NORMAL
SODIUM SERPL-SCNC: 136 MMOL/L (ref 136–145)
WBC # BLD AUTO: 6.5 X10*3/UL (ref 4.4–11.3)

## 2024-01-15 PROCEDURE — 36415 COLL VENOUS BLD VENIPUNCTURE: CPT

## 2024-01-15 PROCEDURE — 80053 COMPREHEN METABOLIC PANEL: CPT

## 2024-01-15 PROCEDURE — 96372 THER/PROPH/DIAG INJ SC/IM: CPT | Performed by: INTERNAL MEDICINE

## 2024-01-15 PROCEDURE — 96372 THER/PROPH/DIAG INJ SC/IM: CPT

## 2024-01-15 PROCEDURE — 85025 COMPLETE CBC W/AUTO DIFF WBC: CPT

## 2024-01-15 PROCEDURE — 2500000004 HC RX 250 GENERAL PHARMACY W/ HCPCS (ALT 636 FOR OP/ED): Mod: JZ | Performed by: INTERNAL MEDICINE

## 2024-01-15 PROCEDURE — 85060 BLOOD SMEAR INTERPRETATION: CPT | Performed by: INTERNAL MEDICINE

## 2024-01-15 RX ORDER — FAMOTIDINE 10 MG/ML
20 INJECTION INTRAVENOUS ONCE AS NEEDED
Status: CANCELLED | OUTPATIENT
Start: 2024-02-02

## 2024-01-15 RX ORDER — ALBUTEROL SULFATE 0.83 MG/ML
3 SOLUTION RESPIRATORY (INHALATION) AS NEEDED
Status: CANCELLED | OUTPATIENT
Start: 2024-02-02

## 2024-01-15 RX ORDER — DIPHENHYDRAMINE HYDROCHLORIDE 50 MG/ML
50 INJECTION INTRAMUSCULAR; INTRAVENOUS AS NEEDED
Status: CANCELLED | OUTPATIENT
Start: 2024-02-02

## 2024-01-15 RX ORDER — EPINEPHRINE 0.3 MG/.3ML
0.3 INJECTION SUBCUTANEOUS EVERY 5 MIN PRN
Status: CANCELLED | OUTPATIENT
Start: 2024-02-02

## 2024-01-15 RX ADMIN — LUSPATERCEPT 100 MG: 75 INJECTION, POWDER, LYOPHILIZED, FOR SOLUTION SUBCUTANEOUS at 10:46

## 2024-01-15 ASSESSMENT — PAIN SCALES - GENERAL: PAINLEVEL: 0-NO PAIN

## 2024-01-16 LAB — PATH REVIEW-CBC DIFFERENTIAL: NORMAL

## 2024-01-25 ENCOUNTER — OFFICE VISIT (OUTPATIENT)
Dept: CARDIOLOGY | Facility: CLINIC | Age: 79
End: 2024-01-25
Payer: MEDICARE

## 2024-01-25 VITALS
HEIGHT: 67 IN | WEIGHT: 166.1 LBS | BODY MASS INDEX: 26.07 KG/M2 | SYSTOLIC BLOOD PRESSURE: 100 MMHG | DIASTOLIC BLOOD PRESSURE: 58 MMHG | HEART RATE: 84 BPM

## 2024-01-25 DIAGNOSIS — N18.31 STAGE 3A CHRONIC KIDNEY DISEASE (MULTI): ICD-10-CM

## 2024-01-25 DIAGNOSIS — Z79.84 DIABETES MELLITUS TREATED WITH INSULIN AND ORAL MEDICATION (MULTI): ICD-10-CM

## 2024-01-25 DIAGNOSIS — Z79.4 DIABETES MELLITUS TREATED WITH INSULIN AND ORAL MEDICATION (MULTI): ICD-10-CM

## 2024-01-25 DIAGNOSIS — Z95.1 S/P CABG X 5: ICD-10-CM

## 2024-01-25 DIAGNOSIS — I25.10 CORONARY ARTERY DISEASE INVOLVING NATIVE CORONARY ARTERY OF NATIVE HEART, UNSPECIFIED WHETHER ANGINA PRESENT: Primary | ICD-10-CM

## 2024-01-25 DIAGNOSIS — I73.9 PVD (PERIPHERAL VASCULAR DISEASE) (CMS-HCC): ICD-10-CM

## 2024-01-25 DIAGNOSIS — E78.2 MIXED HYPERLIPIDEMIA: ICD-10-CM

## 2024-01-25 DIAGNOSIS — E11.9 DIABETES MELLITUS TREATED WITH INSULIN AND ORAL MEDICATION (MULTI): ICD-10-CM

## 2024-01-25 DIAGNOSIS — I10 ESSENTIAL HYPERTENSION: ICD-10-CM

## 2024-01-25 DIAGNOSIS — Z87.891 FORMER SMOKER: ICD-10-CM

## 2024-01-25 PROCEDURE — 1036F TOBACCO NON-USER: CPT | Performed by: INTERNAL MEDICINE

## 2024-01-25 PROCEDURE — 1126F AMNT PAIN NOTED NONE PRSNT: CPT | Performed by: INTERNAL MEDICINE

## 2024-01-25 PROCEDURE — 3074F SYST BP LT 130 MM HG: CPT | Performed by: INTERNAL MEDICINE

## 2024-01-25 PROCEDURE — 3078F DIAST BP <80 MM HG: CPT | Performed by: INTERNAL MEDICINE

## 2024-01-25 PROCEDURE — 1159F MED LIST DOCD IN RCRD: CPT | Performed by: INTERNAL MEDICINE

## 2024-01-25 PROCEDURE — 99213 OFFICE O/P EST LOW 20 MIN: CPT | Performed by: INTERNAL MEDICINE

## 2024-01-25 RX ORDER — NITROGLYCERIN 0.4 MG/1
0.4 TABLET SUBLINGUAL EVERY 5 MIN PRN
COMMUNITY
End: 2024-01-25 | Stop reason: SDUPTHER

## 2024-01-25 RX ORDER — NITROGLYCERIN 0.4 MG/1
0.4 TABLET SUBLINGUAL EVERY 5 MIN PRN
Qty: 25 TABLET | Refills: 5 | Status: SHIPPED | OUTPATIENT
Start: 2024-01-25 | End: 2025-01-24

## 2024-01-25 NOTE — PROGRESS NOTES
Referred by Dr. Marroquin ref. provider found provider found for   Chief Complaint   Patient presents with    Follow-up     6 month follow up, pt had PVR done 8/2023        History of Present Illness  Chas Melgar is a 78 y.o. year old male patient PVR showed no significant stenosis.  He does have chronic anemia and been managed by hematologist.  Did not have any symptoms of chest pain palpitations syncope or presyncope.  I told the patient because of his anemia probably can stop his Plavix for now.  Will continue other medication will call for any problem and follow-up as scheduled    Past Medical History  Past Medical History:   Diagnosis Date    Acute gastric ulcer with hemorrhage 09/07/2022    Acute gastric ulcer with bleeding    Acute upper respiratory infection, unspecified     URI, acute    Arthritis     Body mass index (BMI) 28.0-28.9, adult 10/19/2021    BMI 28.0-28.9,adult    Cancer (CMS/[facility])     Coronary artery disease     Diabetes mellitus (CMS/[facility])     Encounter for other preprocedural examination 10/14/2021    Pre-operative clearance    Encounter for screening for malignant neoplasm of prostate     Encounter for prostate cancer screening    Impingement syndrome of unspecified shoulder 07/22/2021    Impingement syndrome of shoulder region, unspecified laterality    Other abnormalities of breathing     Difficulty breathing    Other specified disorders of pancreatic internal secretion     Other specified disorders of pancreatic internal secretion    Other specified follicular disorders     Actinic folliculitis    Other specified postprocedural states 06/09/2021    Status post arthroscopy of left shoulder    Overweight 02/21/2022    Overweight with body mass index (BMI) of 29 to 29.9 in adult    Overweight 01/18/2022    Overweight with body mass index (BMI) of 28 to 28.9 in adult    Pain in right hip     Hip pain, right    Pain in right leg     Pain of right lower extremity    Pain in unspecified hip  08/04/2021    Hip joint pain    Pain in unspecified shoulder 08/10/2021    Shoulder pain    Personal history of malignant neoplasm, unspecified 08/04/2021    History of malignant neoplasm    Personal history of other diseases of male genital organs     History of benign prostatic hyperplasia    Personal history of other diseases of the circulatory system 08/04/2021    History of hypertension    Personal history of other diseases of the circulatory system 08/04/2021    History of cardiac disorder    Personal history of other diseases of the digestive system 10/14/2021    History of gastrointestinal hemorrhage    Personal history of other diseases of the digestive system 02/26/2022    History of right inguinal hernia    Personal history of other diseases of the digestive system     History of fatty infiltration of liver    Personal history of other diseases of the musculoskeletal system and connective tissue 08/04/2021    History of arthritis    Personal history of other diseases of the musculoskeletal system and connective tissue 08/05/2021    History of rotator cuff tear    Personal history of other diseases of the respiratory system 11/17/2021    History of upper respiratory infection    Personal history of other diseases of the respiratory system     History of acute bronchitis    Personal history of other diseases of the respiratory system     History of acute pharyngitis    Personal history of other diseases of the respiratory system     History of acute sinusitis    Personal history of other diseases of urinary system 09/01/2022    History of renal insufficiency syndrome    Personal history of other endocrine, nutritional and metabolic disease 10/06/2021    History of diabetes mellitus    Personal history of other endocrine, nutritional and metabolic disease 01/18/2022    History of hyperkalemia    Personal history of other malignant neoplasm of skin     History of other malignant neoplasm of skin    Personal  "history of other specified conditions 2022    History of chest pain    Personal history of other specified conditions     History of dysuria    Personal history of other specified conditions     History of chest pain    Persons encountering health services in other specified circumstances     Encounter for support and coordination of transition of care    Primary osteoarthritis, 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    DJD of left shoulder    Right lower quadrant pain 2022    Right inguinal pain    Strain of muscle, fascia and tendon of lower back, initial encounter     Lumbar strain    Strain of muscle, fascia and tendon of other parts of biceps, right arm, initial encounter 10/14/2021    Rupture of right biceps tendon, initial encounter    Trochanteric bursitis, left hip 08/10/2021    Trochanteric bursitis of left hip       Social History  Social History     Tobacco Use    Smoking status: Former     Packs/day: 2.00     Years: 20.00     Additional pack years: 0.00     Total pack years: 40.00     Types: Cigarettes     Quit date: 1986     Years since quittin.0    Smokeless tobacco: Never   Substance Use Topics    Alcohol use: Yes     Alcohol/week: 2.0 standard drinks of alcohol     Types: 2 Standard drinks or equivalent per week    Drug use: Never       Family History     Family History   Problem Relation Name Age of Onset    Diabetes Mother Melinda andino     Coronary artery disease Father      Hypertension Other      Alzheimer's disease Other         Review of Systems  As per HPI, all other systems reviewed and negative.    Allergies:  Allergies   Allergen Reactions    Aluminum Rash     Aluminum    Nickel Unknown and Rash     NICKEL  CONTACT DERMATITIS        Outpatient Medications:  Current Outpatient Medications   Medication Instructions    aspirin 81 mg EC tablet 1 tablet, oral, Daily    BD Ultra-Fine Short Pen Needle 31 gauge x 5/16\" needle USE ONCE DAILY WITH LEVEMIR    clopidogrel (PLAVIX) 75 mg, " oral, Daily    cyanocobalamin (Vitamin B-12) 1,000 mcg tablet 2 tablets, oral, Daily    dilTIAZem CD (CARDIZEM CD) 240 mg, oral, Daily    ergocalciferol (Vitamin D-2) 1.25 MG (88841 UT) capsule 1 capsule, oral, Every 14 days    Farxiga 10 mg, oral, Daily before breakfast    folic acid 0.8 mg capsule 1 tablet, oral, Nightly    glimepiride (AMARYL) 4 mg, oral, 2 times daily    hydroCHLOROthiazide (HYDRODIURIL) 25 mg, oral, Daily    isosorbide mononitrate ER (IMDUR) 30 mg, oral, Daily    Levemir FlexPen 15 Units, subcutaneous, Nightly    lidocaine 4 % patch 1 patch, transdermal, Daily, Remove & discard patch within 12 hours or as directed by MD.    lisinopril 30 mg, oral, Daily    nitroglycerin (NITROSTAT) 0.4 mg, sublingual, Every 5 min PRN    pantoprazole (ProtoNix) 40 mg EC tablet 1 tablet, oral, Daily    simvastatin (ZOCOR) 20 mg, oral, Nightly    tamsulosin (Flomax) 0.4 mg 24 hr capsule 1 capsule, oral, Daily         Vitals:  Vitals:    01/25/24 0926   BP: 100/58   Pulse: 84       Physical Exam:  Physical Exam  Constitutional:       Appearance: Normal appearance.   HENT:      Head: Normocephalic.   Eyes:      Pupils: Pupils are equal, round, and reactive to light.   Cardiovascular:      Rate and Rhythm: Normal rate and regular rhythm.      Pulses: Normal pulses.      Heart sounds: Normal heart sounds.   Pulmonary:      Effort: Pulmonary effort is normal.      Breath sounds: Normal breath sounds.   Musculoskeletal:         General: Normal range of motion.   Skin:     General: Skin is warm and dry.   Neurological:      General: No focal deficit present.      Mental Status: He is alert and oriented to person, place, and time.             Assessment/Plan   Diagnoses and all orders for this visit:  S/P CABG x 5  Essential hypertension  Coronary artery disease involving native coronary artery of native heart, unspecified whether angina present  Diabetes mellitus treated with insulin and oral medication (CMS/[facility])  Stage  3a chronic kidney disease (CMS/HCC)  Mixed hyperlipidemia  PVD (peripheral vascular disease) (CMS/HCC)  Former smoker  BMI 26.0-26.9,adult          Wilfrido Goodwin MD [facility]  Interventional Cardiology   of [facility]     Thank you for allowing me to participate in the care of this patient. Please do not hesitate to contact me with any further questions or concerns.

## 2024-01-30 DIAGNOSIS — K21.9 GASTROESOPHAGEAL REFLUX DISEASE, UNSPECIFIED WHETHER ESOPHAGITIS PRESENT: Primary | ICD-10-CM

## 2024-01-30 RX ORDER — PANTOPRAZOLE SODIUM 40 MG/1
40 TABLET, DELAYED RELEASE ORAL DAILY
Qty: 90 TABLET | Refills: 3 | Status: SHIPPED | OUTPATIENT
Start: 2024-01-30

## 2024-02-05 ENCOUNTER — INFUSION (OUTPATIENT)
Dept: HEMATOLOGY/ONCOLOGY | Facility: CLINIC | Age: 79
End: 2024-02-05
Payer: MEDICARE

## 2024-02-05 ENCOUNTER — LAB (OUTPATIENT)
Dept: LAB | Facility: CLINIC | Age: 79
End: 2024-02-05
Payer: MEDICARE

## 2024-02-05 VITALS
OXYGEN SATURATION: 97 % | WEIGHT: 166.67 LBS | RESPIRATION RATE: 16 BRPM | SYSTOLIC BLOOD PRESSURE: 133 MMHG | DIASTOLIC BLOOD PRESSURE: 69 MMHG | TEMPERATURE: 97.2 F | BODY MASS INDEX: 26.5 KG/M2 | HEART RATE: 76 BPM

## 2024-02-05 DIAGNOSIS — D46.1 REFRACTORY ANEMIA WITH RING SIDEROBLASTS (MULTI): ICD-10-CM

## 2024-02-05 DIAGNOSIS — D46.Z MDS (MYELODYSPLASTIC SYNDROME), LOW GRADE (MULTI): ICD-10-CM

## 2024-02-05 LAB
ALBUMIN SERPL BCP-MCNC: 4.3 G/DL (ref 3.4–5)
ALP SERPL-CCNC: 83 U/L (ref 33–136)
ALT SERPL W P-5'-P-CCNC: 10 U/L (ref 10–52)
ANION GAP SERPL CALC-SCNC: 16 MMOL/L (ref 10–20)
AST SERPL W P-5'-P-CCNC: 10 U/L (ref 9–39)
BASO STIPL BLD QL SMEAR: PRESENT
BASOPHILS # BLD AUTO: 0.07 X10*3/UL (ref 0–0.1)
BASOPHILS NFR BLD AUTO: 1.1 %
BILIRUB SERPL-MCNC: 1.1 MG/DL (ref 0–1.2)
BUN SERPL-MCNC: 36 MG/DL (ref 6–23)
CALCIUM SERPL-MCNC: 8.8 MG/DL (ref 8.6–10.3)
CHLORIDE SERPL-SCNC: 103 MMOL/L (ref 98–107)
CO2 SERPL-SCNC: 23 MMOL/L (ref 21–32)
CREAT SERPL-MCNC: 1.8 MG/DL (ref 0.5–1.3)
EGFRCR SERPLBLD CKD-EPI 2021: 38 ML/MIN/1.73M*2
EOSINOPHIL # BLD AUTO: 0.72 X10*3/UL (ref 0–0.4)
EOSINOPHIL NFR BLD AUTO: 10.9 %
ERYTHROCYTE [DISTWIDTH] IN BLOOD BY AUTOMATED COUNT: 20.2 % (ref 11.5–14.5)
GIANT PLATELETS BLD QL SMEAR: NORMAL
GLUCOSE SERPL-MCNC: 223 MG/DL (ref 74–99)
HCT VFR BLD AUTO: 30.8 % (ref 41–52)
HGB BLD-MCNC: 10.5 G/DL (ref 13.5–17.5)
IMM GRANULOCYTES # BLD AUTO: 0.03 X10*3/UL (ref 0–0.5)
IMM GRANULOCYTES NFR BLD AUTO: 0.5 % (ref 0–0.9)
LYMPHOCYTES # BLD AUTO: 1.81 X10*3/UL (ref 0.8–3)
LYMPHOCYTES NFR BLD AUTO: 27.3 %
MCH RBC QN AUTO: 39.3 PG (ref 26–34)
MCHC RBC AUTO-ENTMCNC: 34.1 G/DL (ref 32–36)
MCV RBC AUTO: 115 FL (ref 80–100)
MONOCYTES # BLD AUTO: 0.65 X10*3/UL (ref 0.05–0.8)
MONOCYTES NFR BLD AUTO: 9.8 %
NEUTROPHILS # BLD AUTO: 3.34 X10*3/UL (ref 1.6–5.5)
NEUTROPHILS NFR BLD AUTO: 50.4 %
NRBC BLD-RTO: ABNORMAL /100{WBCS}
OVALOCYTES BLD QL SMEAR: NORMAL
PLATELET # BLD AUTO: 184 X10*3/UL (ref 150–450)
POLYCHROMASIA BLD QL SMEAR: NORMAL
POTASSIUM SERPL-SCNC: 4 MMOL/L (ref 3.5–5.3)
PROT SERPL-MCNC: 6.7 G/DL (ref 6.4–8.2)
RBC # BLD AUTO: 2.67 X10*6/UL (ref 4.5–5.9)
RBC MORPH BLD: NORMAL
SCHISTOCYTES BLD QL SMEAR: NORMAL
SODIUM SERPL-SCNC: 138 MMOL/L (ref 136–145)
TARGETS BLD QL SMEAR: NORMAL
WBC # BLD AUTO: 6.6 X10*3/UL (ref 4.4–11.3)

## 2024-02-05 PROCEDURE — 96372 THER/PROPH/DIAG INJ SC/IM: CPT | Performed by: INTERNAL MEDICINE

## 2024-02-05 PROCEDURE — 85025 COMPLETE CBC W/AUTO DIFF WBC: CPT

## 2024-02-05 PROCEDURE — 80053 COMPREHEN METABOLIC PANEL: CPT

## 2024-02-05 PROCEDURE — 96372 THER/PROPH/DIAG INJ SC/IM: CPT

## 2024-02-05 PROCEDURE — 2500000004 HC RX 250 GENERAL PHARMACY W/ HCPCS (ALT 636 FOR OP/ED): Mod: JZ | Performed by: INTERNAL MEDICINE

## 2024-02-05 PROCEDURE — 36415 COLL VENOUS BLD VENIPUNCTURE: CPT

## 2024-02-05 RX ORDER — FAMOTIDINE 10 MG/ML
20 INJECTION INTRAVENOUS ONCE AS NEEDED
Status: DISCONTINUED | OUTPATIENT
Start: 2024-02-05 | End: 2024-02-05 | Stop reason: HOSPADM

## 2024-02-05 RX ORDER — DIPHENHYDRAMINE HYDROCHLORIDE 50 MG/ML
50 INJECTION INTRAMUSCULAR; INTRAVENOUS AS NEEDED
Status: DISCONTINUED | OUTPATIENT
Start: 2024-02-05 | End: 2024-02-05 | Stop reason: HOSPADM

## 2024-02-05 RX ORDER — EPINEPHRINE 0.3 MG/.3ML
0.3 INJECTION SUBCUTANEOUS EVERY 5 MIN PRN
Status: DISCONTINUED | OUTPATIENT
Start: 2024-02-05 | End: 2024-02-05 | Stop reason: HOSPADM

## 2024-02-05 RX ORDER — DIPHENHYDRAMINE HYDROCHLORIDE 50 MG/ML
50 INJECTION INTRAMUSCULAR; INTRAVENOUS AS NEEDED
Status: CANCELLED | OUTPATIENT
Start: 2024-02-26

## 2024-02-05 RX ORDER — EPINEPHRINE 0.3 MG/.3ML
0.3 INJECTION SUBCUTANEOUS EVERY 5 MIN PRN
Status: CANCELLED | OUTPATIENT
Start: 2024-02-26

## 2024-02-05 RX ORDER — FAMOTIDINE 10 MG/ML
20 INJECTION INTRAVENOUS ONCE AS NEEDED
Status: CANCELLED | OUTPATIENT
Start: 2024-02-26

## 2024-02-05 RX ORDER — ALBUTEROL SULFATE 0.83 MG/ML
3 SOLUTION RESPIRATORY (INHALATION) AS NEEDED
Status: CANCELLED | OUTPATIENT
Start: 2024-02-26

## 2024-02-05 RX ORDER — ALBUTEROL SULFATE 0.83 MG/ML
3 SOLUTION RESPIRATORY (INHALATION) AS NEEDED
Status: DISCONTINUED | OUTPATIENT
Start: 2024-02-05 | End: 2024-02-05 | Stop reason: HOSPADM

## 2024-02-05 RX ADMIN — LUSPATERCEPT 100 MG: 75 INJECTION, POWDER, LYOPHILIZED, FOR SOLUTION SUBCUTANEOUS at 10:21

## 2024-02-05 ASSESSMENT — PAIN SCALES - GENERAL: PAINLEVEL: 0-NO PAIN

## 2024-02-19 ENCOUNTER — OFFICE VISIT (OUTPATIENT)
Dept: ORTHOPEDIC SURGERY | Facility: CLINIC | Age: 79
End: 2024-02-19
Payer: MEDICARE

## 2024-02-19 DIAGNOSIS — M70.62 TROCHANTERIC BURSITIS OF LEFT HIP: Primary | ICD-10-CM

## 2024-02-19 PROCEDURE — 20610 DRAIN/INJ JOINT/BURSA W/O US: CPT | Mod: LT | Performed by: ORTHOPAEDIC SURGERY

## 2024-02-19 PROCEDURE — 1159F MED LIST DOCD IN RCRD: CPT | Performed by: ORTHOPAEDIC SURGERY

## 2024-02-19 PROCEDURE — 99213 OFFICE O/P EST LOW 20 MIN: CPT | Performed by: ORTHOPAEDIC SURGERY

## 2024-02-19 PROCEDURE — 2500000004 HC RX 250 GENERAL PHARMACY W/ HCPCS (ALT 636 FOR OP/ED): Performed by: ORTHOPAEDIC SURGERY

## 2024-02-19 PROCEDURE — 2500000005 HC RX 250 GENERAL PHARMACY W/O HCPCS: Performed by: ORTHOPAEDIC SURGERY

## 2024-02-19 PROCEDURE — 1126F AMNT PAIN NOTED NONE PRSNT: CPT | Performed by: ORTHOPAEDIC SURGERY

## 2024-02-19 PROCEDURE — 1036F TOBACCO NON-USER: CPT | Performed by: ORTHOPAEDIC SURGERY

## 2024-02-19 RX ORDER — BETAMETHASONE SODIUM PHOSPHATE AND BETAMETHASONE ACETATE 3; 3 MG/ML; MG/ML
2 INJECTION, SUSPENSION INTRA-ARTICULAR; INTRALESIONAL; INTRAMUSCULAR; SOFT TISSUE
Status: COMPLETED | OUTPATIENT
Start: 2024-02-19 | End: 2024-02-19

## 2024-02-19 RX ORDER — LIDOCAINE HYDROCHLORIDE 10 MG/ML
5 INJECTION INFILTRATION; PERINEURAL
Status: COMPLETED | OUTPATIENT
Start: 2024-02-19 | End: 2024-02-19

## 2024-02-19 RX ADMIN — BETAMETHASONE SODIUM PHOSPHATE AND BETAMETHASONE ACETATE 2 ML: 3; 3 INJECTION, SUSPENSION INTRA-ARTICULAR; INTRALESIONAL; INTRAMUSCULAR at 10:54

## 2024-02-19 RX ADMIN — LIDOCAINE HYDROCHLORIDE 5 ML: 10 INJECTION, SOLUTION INFILTRATION; PERINEURAL at 10:54

## 2024-02-19 NOTE — PROGRESS NOTES
History of present illness: Seen evaluated for left hip trochanteric bursitis patient sits a lot during the day compressing the hip he had great relief from the shot for 8 to 10 weeks the pain returned he did not do any of the therapy    Physical exam:    General: No acute distress or breathing difficulty or discomfort, pleasant and cooperative with the examination.    Extremities: The affected left hip was examined and inspected.  There was tenderness to touch along the groin and over the lateral aspect of the hip over the bursal area.  Hip joint moves freely.    There was no pain over the groin area to internal/external rotation abduction.    Palpable reproducible pain was reproduced with palpation over the lateral trochanteric process.  There was a tight IT band with a positive Tito sign.      The skin was intact without breakdown or open wound.  Old incisions of present were all healed.  There was mild crepitus seen with internal and external rotation and range of motion without evidence of gross instability.    Inspection of the low back showed normal curvature integrity of the skin.  The strength and stability of the low back and ligaments were within normal limits.    There was a normal straight leg raise with no foot drop, numbness or tingling in the bilateral lower extremities.  Sensation, reflexes and pulses in the foot and ankle are preserved and present.  There was no obvious effusion.    Range of motion showed flexion to beyond 100 degrees degrees, abduction to 30 degrees, external rotation to 15 degrees and 18 degrees of internal rotation.  The patient had the ability to bear weight but with discomfort.  The patient's gait antalgic  secondary to discomfort      Before aspiration injection the benefits of a cortisone injection including infection, local skin irritation, skin atrophy, calcification, continued pain and discomfort, elevated blood sugar, burning, failure to relieve pain, possible late  infection were discussed with the patient.    Postprocedure discomfort can be alleviated with additional medications, ice, elevation, rest over the first 24 hours as recommended.    Patient verbalized understanding and wanted to proceed with the planned procedure.    After informed consent was provided and allergies verified, the patient was positioned appropriately on the  bed.  The left hip to be aspirated and injected was prepped and draped in a sterile fashion.  The skin was anesthetized with ethyl chloride spray.  A joint aspiration was to be performed an 18-gauge needle was used otherwise a 22-gauge needle was used to inject the appropriate joint.    Joint injection was performed with a mixture of 5 cc 1% lidocaine plain and 2 cc Celestone Soluspan 6 mg per mL.  The needle was removed and the puncture site closed and sealed with a Band-Aid.  The patient tolerated the procedure well.    Diagnostic studies: No x-rays    Impression: Injection left hip trochanteric bursitis    Plan: PT therapy stretching program will be very beneficial he is very thin and does not have a lot of subcutaneous fat and he sits a lot during the day so exercise stretching program after the injection could be significantly in terms of prove improving the longevity of the shot obviously no surgery I will see him back in 4 months for possible repeat injection if needed  L Inj/Asp: L greater trochanteric bursa on 2/19/2024 10:54 AM  Indications: pain and diagnostic evaluation  Details: 22 G needle, posterior approach  Medications: 5 mL lidocaine 10 mg/mL (1 %); 2 mL betamethasone acet,sod phos 6 mg/mL  Outcome: tolerated well, no immediate complications  Procedure, treatment alternatives, risks and benefits explained, specific risks discussed. Consent was given by the patient. Immediately prior to procedure a time out was called to verify the correct patient, procedure, equipment, support staff and site/side marked as required. Patient was  prepped and draped in the usual sterile fashion.

## 2024-02-23 ENCOUNTER — APPOINTMENT (OUTPATIENT)
Dept: HEMATOLOGY/ONCOLOGY | Facility: CLINIC | Age: 79
End: 2024-02-23
Payer: MEDICARE

## 2024-02-26 ENCOUNTER — INFUSION (OUTPATIENT)
Dept: HEMATOLOGY/ONCOLOGY | Facility: CLINIC | Age: 79
End: 2024-02-26
Payer: MEDICARE

## 2024-02-26 ENCOUNTER — LAB (OUTPATIENT)
Dept: LAB | Facility: CLINIC | Age: 79
End: 2024-02-26
Payer: MEDICARE

## 2024-02-26 VITALS
DIASTOLIC BLOOD PRESSURE: 68 MMHG | OXYGEN SATURATION: 99 % | TEMPERATURE: 97.3 F | SYSTOLIC BLOOD PRESSURE: 118 MMHG | RESPIRATION RATE: 18 BRPM | WEIGHT: 163.14 LBS | BODY MASS INDEX: 25.94 KG/M2 | HEART RATE: 92 BPM

## 2024-02-26 DIAGNOSIS — D46.Z MDS (MYELODYSPLASTIC SYNDROME), LOW GRADE (MULTI): ICD-10-CM

## 2024-02-26 DIAGNOSIS — D46.1 REFRACTORY ANEMIA WITH RING SIDEROBLASTS (MULTI): ICD-10-CM

## 2024-02-26 LAB
ALBUMIN SERPL BCP-MCNC: 4.5 G/DL (ref 3.4–5)
ALP SERPL-CCNC: 89 U/L (ref 33–136)
ALT SERPL W P-5'-P-CCNC: 10 U/L (ref 10–52)
ANION GAP SERPL CALC-SCNC: 13 MMOL/L (ref 10–20)
AST SERPL W P-5'-P-CCNC: 8 U/L (ref 9–39)
BASOPHILS # BLD AUTO: 0.08 X10*3/UL (ref 0–0.1)
BASOPHILS NFR BLD AUTO: 1 %
BILIRUB SERPL-MCNC: 0.9 MG/DL (ref 0–1.2)
BUN SERPL-MCNC: 49 MG/DL (ref 6–23)
CALCIUM SERPL-MCNC: 9.4 MG/DL (ref 8.6–10.3)
CHLORIDE SERPL-SCNC: 102 MMOL/L (ref 98–107)
CO2 SERPL-SCNC: 24 MMOL/L (ref 21–32)
CREAT SERPL-MCNC: 2.11 MG/DL (ref 0.5–1.3)
EGFRCR SERPLBLD CKD-EPI 2021: 31 ML/MIN/1.73M*2
EOSINOPHIL # BLD AUTO: 0.56 X10*3/UL (ref 0–0.4)
EOSINOPHIL NFR BLD AUTO: 6.7 %
ERYTHROCYTE [DISTWIDTH] IN BLOOD BY AUTOMATED COUNT: 19.2 % (ref 11.5–14.5)
GLUCOSE SERPL-MCNC: 341 MG/DL (ref 74–99)
HCT VFR BLD AUTO: 27.9 % (ref 41–52)
HGB BLD-MCNC: 9.6 G/DL (ref 13.5–17.5)
IMM GRANULOCYTES # BLD AUTO: 0.02 X10*3/UL (ref 0–0.5)
IMM GRANULOCYTES NFR BLD AUTO: 0.2 % (ref 0–0.9)
LYMPHOCYTES # BLD AUTO: 1.89 X10*3/UL (ref 0.8–3)
LYMPHOCYTES NFR BLD AUTO: 22.7 %
MCH RBC QN AUTO: 37.9 PG (ref 26–34)
MCHC RBC AUTO-ENTMCNC: 34.4 G/DL (ref 32–36)
MCV RBC AUTO: 110 FL (ref 80–100)
MONOCYTES # BLD AUTO: 0.76 X10*3/UL (ref 0.05–0.8)
MONOCYTES NFR BLD AUTO: 9.1 %
NEUTROPHILS # BLD AUTO: 5 X10*3/UL (ref 1.6–5.5)
NEUTROPHILS NFR BLD AUTO: 60.3 %
PLATELET # BLD AUTO: 177 X10*3/UL (ref 150–450)
POTASSIUM SERPL-SCNC: 4.2 MMOL/L (ref 3.5–5.3)
PROT SERPL-MCNC: 6.7 G/DL (ref 6.4–8.2)
RBC # BLD AUTO: 2.53 X10*6/UL (ref 4.5–5.9)
SODIUM SERPL-SCNC: 135 MMOL/L (ref 136–145)
WBC # BLD AUTO: 8.3 X10*3/UL (ref 4.4–11.3)

## 2024-02-26 PROCEDURE — 96372 THER/PROPH/DIAG INJ SC/IM: CPT

## 2024-02-26 PROCEDURE — 85025 COMPLETE CBC W/AUTO DIFF WBC: CPT

## 2024-02-26 PROCEDURE — 96372 THER/PROPH/DIAG INJ SC/IM: CPT | Performed by: INTERNAL MEDICINE

## 2024-02-26 PROCEDURE — 80053 COMPREHEN METABOLIC PANEL: CPT

## 2024-02-26 PROCEDURE — 36415 COLL VENOUS BLD VENIPUNCTURE: CPT

## 2024-02-26 PROCEDURE — 2500000004 HC RX 250 GENERAL PHARMACY W/ HCPCS (ALT 636 FOR OP/ED): Mod: JW | Performed by: INTERNAL MEDICINE

## 2024-02-26 RX ORDER — ALBUTEROL SULFATE 0.83 MG/ML
3 SOLUTION RESPIRATORY (INHALATION) AS NEEDED
Status: CANCELLED | OUTPATIENT
Start: 2024-03-18

## 2024-02-26 RX ORDER — EPINEPHRINE 0.3 MG/.3ML
0.3 INJECTION SUBCUTANEOUS EVERY 5 MIN PRN
Status: CANCELLED | OUTPATIENT
Start: 2024-03-18

## 2024-02-26 RX ORDER — FAMOTIDINE 10 MG/ML
20 INJECTION INTRAVENOUS ONCE AS NEEDED
Status: DISCONTINUED | OUTPATIENT
Start: 2024-02-26 | End: 2024-02-26 | Stop reason: HOSPADM

## 2024-02-26 RX ORDER — ALBUTEROL SULFATE 0.83 MG/ML
3 SOLUTION RESPIRATORY (INHALATION) AS NEEDED
Status: DISCONTINUED | OUTPATIENT
Start: 2024-02-26 | End: 2024-02-26 | Stop reason: HOSPADM

## 2024-02-26 RX ORDER — EPINEPHRINE 0.3 MG/.3ML
0.3 INJECTION SUBCUTANEOUS EVERY 5 MIN PRN
Status: DISCONTINUED | OUTPATIENT
Start: 2024-02-26 | End: 2024-02-26 | Stop reason: HOSPADM

## 2024-02-26 RX ORDER — FAMOTIDINE 10 MG/ML
20 INJECTION INTRAVENOUS ONCE AS NEEDED
Status: CANCELLED | OUTPATIENT
Start: 2024-03-18

## 2024-02-26 RX ORDER — DIPHENHYDRAMINE HYDROCHLORIDE 50 MG/ML
50 INJECTION INTRAMUSCULAR; INTRAVENOUS AS NEEDED
Status: CANCELLED | OUTPATIENT
Start: 2024-03-18

## 2024-02-26 RX ORDER — DIPHENHYDRAMINE HYDROCHLORIDE 50 MG/ML
50 INJECTION INTRAMUSCULAR; INTRAVENOUS AS NEEDED
Status: DISCONTINUED | OUTPATIENT
Start: 2024-02-26 | End: 2024-02-26 | Stop reason: HOSPADM

## 2024-02-26 RX ADMIN — LUSPATERCEPT 97.5 MG: 25 INJECTION, POWDER, LYOPHILIZED, FOR SOLUTION SUBCUTANEOUS at 12:32

## 2024-02-26 ASSESSMENT — PAIN SCALES - GENERAL: PAINLEVEL: 0-NO PAIN

## 2024-02-28 DIAGNOSIS — Z79.84 DIABETES MELLITUS TREATED WITH INSULIN AND ORAL MEDICATION (MULTI): Primary | ICD-10-CM

## 2024-02-28 DIAGNOSIS — E11.9 DIABETES MELLITUS TREATED WITH INSULIN AND ORAL MEDICATION (MULTI): Primary | ICD-10-CM

## 2024-02-28 DIAGNOSIS — Z79.4 DIABETES MELLITUS TREATED WITH INSULIN AND ORAL MEDICATION (MULTI): Primary | ICD-10-CM

## 2024-02-28 NOTE — TELEPHONE ENCOUNTER
Pt requesting rx refill for levemir flexpen to [facility]. Pt has pending appt 4/18/24.

## 2024-02-29 RX ORDER — INSULIN DETEMIR 100 [IU]/ML
15 INJECTION, SOLUTION SUBCUTANEOUS NIGHTLY
Qty: 3 ML | Refills: 1 | Status: SHIPPED | OUTPATIENT
Start: 2024-02-29 | End: 2024-03-06

## 2024-03-04 ENCOUNTER — EVALUATION (OUTPATIENT)
Dept: PHYSICAL THERAPY | Facility: CLINIC | Age: 79
End: 2024-03-04
Payer: MEDICARE

## 2024-03-04 DIAGNOSIS — M70.62 TROCHANTERIC BURSITIS OF LEFT HIP: ICD-10-CM

## 2024-03-04 PROCEDURE — 97162 PT EVAL MOD COMPLEX 30 MIN: CPT | Mod: GP | Performed by: PHYSICAL THERAPIST

## 2024-03-04 PROCEDURE — 97110 THERAPEUTIC EXERCISES: CPT | Mod: GP | Performed by: PHYSICAL THERAPIST

## 2024-03-04 NOTE — PROGRESS NOTES
Physical Therapy Evaluation and Treatment      Patient Name: Chas Melgar  MRN: [id number]  Today's Date: 3/4/2024  Time Calculation  Start Time: 0205  Stop Time: 0250  Time Calculation (min): 45 min      PT Evaluation Time Entry  PT Evaluation (Moderate) Time Entry: 25  PT Therapeutic Procedures Time Entry  Neuromuscular Re-Education Time Entry: 5  Therapeutic Exercise Time Entry: 15    INSURANCE:  Visit number: 1/10  MDCR    Referring Provider: Dr. Jimmy Ramirez  Hx: Skin CA, DM/Neuropathy, HTN, Arthritis    ASSESSMENT:  PT Assessment Results: decreased knowledge of HEP, activity limitations, ADLs/IADLs/self care skills, flexibility, motor function/control/tone, pain, participation restrictions, range of motion/joint mobility, strength, posture, transfers, coordination, balance, fall risk, gait/locomotion, decreased knowledge of assisted device use, integumentary, decreased knowledge of precautions.  Rehab Prognosis: Good  Evaluation/Treatment Tolerance: Patient tolerated treatment well    Chas Melgar is a 78 y.o. male presenting to the clinic with s/s consistent with lumbar radiculopathy and L hip bursitis. Pt demonstrates decreased ROM and strength of the B hips and lumbar spine causing pain and dysfunction with walking, standing, STSs, squatting, bending, lifting, carrying, and stairs. Pt was given and reviewed HEP including stretching/strengthening. The patient was educated on the importance of positioning, proper posture/use of lumbar roll, and body mechanics with transfers/log roll, the importance of general therapeutic exercise, anatomy, function, & likely cause of impairments. Pt will benefit from skilled PT in order to increase ROM and strength of the B hips and lumbar spine so that the pt can perform ADLs without pain and return to PLOF.     PLAN:  Treatments/Interventions: gait training, traction, dry needling, edema control, education/instruction, home program, self care/home  management, manual therapy, neuromuscular re-education, therapeutic activities, therapeutic exercises, modalities, therapeutic elastic taping.   PT Plan: Skilled PT  PT Frequency: 2 times per week  Duration: 10 visits  Onset Date: 03/04/22  Certification Period Start Date: 03/04/24  Certification Period End Date: 06/02/24  Rehab Potential: Good  Plan of Care Agreement: Patient    Goals -    Pt will report less than a 2/10 pain at the worst.  Pt will report a significant improvement with  LEFS score.  Pt will have at least 4+/5 strength for the bilateral hips.  Pt will have AROM to WFL for the bilateral hips and lumbar spine.  Pt will be independent with HEP.    CURRENT PROBLEM:   1. Trochanteric bursitis of left hip  Referral to Physical Therapy          Subjective    General -    Pt states that about 2 years ago he started having pain in his hips, mostly on the L side. Pt also has a history of lumbar stenosis.    Mechanism of Injury -    Insidious Onset    History of Current Episode - 2 years ago (3/4/22)    Pain -    Pain Location: B hips (L>R) and lumbar spine  Pain Best: 0/10  Pain Today: 1/10 sitting, 4-5 moving  Pain Worst: 7/10   Pain Type: Intermittent, Achy/Dull, Stiffness/Tightness, Spasm, and L LE and B feet Numbness/Tingling    Pain Exacerbating Factors: walking, standing, STSs, squatting, bending, lifting, carrying, and stairs.  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    Patient Stated Goals -  Reduce pain    PRECAUTIONS -    Fall Risk    Patient Active Problem List   Diagnosis    Abnormal results of cardiovascular function studies    Angina, class II (CMS/HCC)    Benign  prostatic hyperplasia    Carotid bruit    Chronic anemia    CKD (chronic kidney disease), stage III (CMS/HCC)    Coronary artery disease involving native coronary artery of native heart    Essential hypertension    Fatty liver    GERD (gastroesophageal reflux disease)    Iron disorder    Mixed hyperlipidemia    PVD (peripheral vascular disease) (CMS/HCC)    S/P CABG x 5    Squamous cell carcinoma of skin of face    Stenosis of both external carotid arteries    Vitamin D deficiency    Diabetes mellitus treated with insulin and oral medication (CMS/HCC)    Former smoker    BMI 26.0-26.9,adult    MDS (myelodysplastic syndrome), low grade (CMS/HCC)    Refractory anemia with ring sideroblasts (CMS/HCC)       Prior Level of Function -    I with ADLs/IADLs     Objective     Hip AROM (degrees) - WFL grossly except reduced ext and IR    Hip MMT (/5) -  R hip Flexion: 4   R hip ER: 4   R hip IR: 4   R hip Abduction: 4-   L hip Flexion: 4    L hip ER: 4-   L hip IR: 4-   L hip Abduction: 4-     Lumbar AROM by Percent -  Lumbar Flexion: 70%  Lumbar Extension: 20%    Posture -   Rounded Shoulders  Forward Head  Lower Crossed Syndrome  Increased Lumbar Lordosis/APT  Forward Trunk Lean    Functional Assessment/Special Tests -  Slump positive left  BULL positive bilateral  Mingo positive bilateral    Gait -  Pt is ambulating with a Rolator walker but able to perform without an assistive device.  Noted: antalgic, decreased heel strike, decreased toe push off, reduced jo and step length, Trendelenburg, and trunk lean.     Outcome Measures -   Tinetti  Initiation of Gait: No hesitancy  Step Height: R Swing Foot: Right foot does not clear floor completely with step  Step Length: R Swing Foot: Does not pass left stance foot with step  Step Height: L Swing Foot: Left foot does not clear floor completely with step  Step Length: L Swing Foot: Does not pass right stance foot with step  Step Symmetry: Right and left step appear  equal  Step Continuity: Stopping or discontinuity between steps  Path: Mild/moderate deviation or uses walking aid  Trunk: No sway but flexion of knees or back or spreads arms out while walking  Walking Time: Heels apart  Gait Score: 4    Other Measures  Lower Extremity Funtional Score (LEFS): 21/80     Treatment -   Evaluation -   Moderate (10002)  Therapeutic Exercise (76826) -     Modified Mingo Stretch   Bridge on Heels  TA isometric push with breath: 5 sec x10  A isometric push hook-lying Clamshell: RTB x10  Supine Piriformis Stretch: 30 sec x2 ea B (push or pull)  Seated Correct Posture/Log Roll: reviewed    OP Patient Education -   Access Code: [email or web address]  URL: [email or web address]  Date: 03/04/2024  Prepared by: Raisa Hughes    Exercises  - Modified Mingo Stretch  - 1-2 x daily - 3 sets - 30 seconds hold  - Bridge on Heels  - 1-2 x daily - 2-3 sets - 10 reps - 2 sec hold  - Supine Transversus Abdominis Bracing - Hands on Ground  - 1-2 x daily - 2 sets - 10 reps - 5 sec hold  - Hooklying Clamshell with Resistance  - 1-2 x daily - 2-3 sets - 10 reps  - Supine Figure 4 Piriformis Stretch  - 1-2 x daily - 3 sets - 30 sec hold  - Supine Piriformis Stretch with Foot on Ground  - 1-2 x daily - 3 sets - 30 sec hold  - Seated Correct Posture     Patient Education  - Log Roll

## 2024-03-12 ENCOUNTER — TREATMENT (OUTPATIENT)
Dept: PHYSICAL THERAPY | Facility: CLINIC | Age: 79
End: 2024-03-12
Payer: MEDICARE

## 2024-03-12 DIAGNOSIS — M70.62 TROCHANTERIC BURSITIS OF LEFT HIP: Primary | ICD-10-CM

## 2024-03-12 PROCEDURE — 97110 THERAPEUTIC EXERCISES: CPT | Mod: GP | Performed by: PHYSICAL THERAPIST

## 2024-03-13 ENCOUNTER — TELEPHONE (OUTPATIENT)
Dept: NEPHROLOGY | Facility: CLINIC | Age: 79
End: 2024-03-13
Payer: MEDICARE

## 2024-03-13 DIAGNOSIS — N18.32 STAGE 3B CHRONIC KIDNEY DISEASE (MULTI): Primary | ICD-10-CM

## 2024-03-13 DIAGNOSIS — E08.22 DIABETES MELLITUS DUE TO UNDERLYING CONDITION WITH DIABETIC CHRONIC KIDNEY DISEASE, UNSPECIFIED CKD STAGE, UNSPECIFIED WHETHER LONG TERM INSULIN USE (MULTI): ICD-10-CM

## 2024-03-13 DIAGNOSIS — I10 ESSENTIAL HYPERTENSION: ICD-10-CM

## 2024-03-13 DIAGNOSIS — R80.8 OTHER PROTEINURIA: ICD-10-CM

## 2024-03-13 NOTE — PROGRESS NOTES
Physical Therapy Treatment    Patient Name: Chas Melgar  MRN: [id number]  Today's Date: 3/13/2024             Current Problem  1. Trochanteric bursitis of left hip            Subjective   General     Insurance   Medicare  Visit 2  Pain       Objective   Treatments:    Assessment     Plan:  Progress with POC as tolerated.    OP EDUCATION:       Goals:

## 2024-03-13 NOTE — PROGRESS NOTES
"Physical Therapy Treatment    Patient Name: Chas Melgar  MRN: [id number]  Today's Date: 3/13/2024  Time In: 0915  Time Out: 0950  Total Time: 35 mins    Current Problem  Trochanteric bursitis    Insurance   *M70.62 (ICD-10-CM) - Trochanteric bursitis of left hip // Caitlin confirmed 2/29/24 2:22pm  MEDICARE/ [facility] 2230($0 USED) COPAY 0 ( MET)  COVERAGE 80/100 OOP 0 [facility] TO FOLLOW MEDICARE  NO AUTH REQ [id number]   Precautions      Subjective : pt reports little relief with injection, reports 1/10 resting pain in L hip      Pain  1/10 L hip pain      Objective :      Treatments:  There Ex:   Bridges on heels 2x10  TA isometric pish 5 sec x15  Isometric hooklying clamshell RTB x15  Supine piriformis figure 4 stretch 30\"x3  Modified nadine stretch 2x30 sec  LAQ w/add brace 2x10    Ambulate with no assistive devicie      Assessment:     Ambulate with no assistive device today, unsteady, encouraged to use rollator to dec fall risk, decreased step height on L, ambulate very  slowly due to unsteadiness. Pt reports decreased hip pain after tx session.      Plan:     Cont with core stabilization exercises to improve posture.     Treatment performed by Ana Kunz PTA  "

## 2024-03-14 ENCOUNTER — APPOINTMENT (OUTPATIENT)
Dept: PHYSICAL THERAPY | Facility: CLINIC | Age: 79
End: 2024-03-14
Payer: MEDICARE

## 2024-03-14 ENCOUNTER — LAB (OUTPATIENT)
Dept: LAB | Facility: LAB | Age: 79
End: 2024-03-14
Payer: MEDICARE

## 2024-03-14 DIAGNOSIS — N18.32 STAGE 3B CHRONIC KIDNEY DISEASE (MULTI): ICD-10-CM

## 2024-03-14 DIAGNOSIS — I10 ESSENTIAL HYPERTENSION: ICD-10-CM

## 2024-03-14 DIAGNOSIS — R80.8 OTHER PROTEINURIA: ICD-10-CM

## 2024-03-14 DIAGNOSIS — E21.3 HYPERPARATHYROIDISM (MULTI): Primary | ICD-10-CM

## 2024-03-14 DIAGNOSIS — E08.22 DIABETES MELLITUS DUE TO UNDERLYING CONDITION WITH DIABETIC CHRONIC KIDNEY DISEASE, UNSPECIFIED CKD STAGE, UNSPECIFIED WHETHER LONG TERM INSULIN USE (MULTI): ICD-10-CM

## 2024-03-14 LAB
25(OH)D3 SERPL-MCNC: 31 NG/ML (ref 30–100)
ANION GAP SERPL CALC-SCNC: 17 MMOL/L (ref 10–20)
BUN SERPL-MCNC: 28 MG/DL (ref 6–23)
CALCIUM SERPL-MCNC: 8.9 MG/DL (ref 8.6–10.3)
CHLORIDE SERPL-SCNC: 101 MMOL/L (ref 98–107)
CO2 SERPL-SCNC: 25 MMOL/L (ref 21–32)
CREAT SERPL-MCNC: 1.75 MG/DL (ref 0.5–1.3)
CREAT UR-MCNC: 84.8 MG/DL (ref 20–370)
EGFRCR SERPLBLD CKD-EPI 2021: 39 ML/MIN/1.73M*2
GLUCOSE SERPL-MCNC: 254 MG/DL (ref 74–99)
POTASSIUM SERPL-SCNC: 4.7 MMOL/L (ref 3.5–5.3)
PROT UR-ACNC: 60 MG/DL (ref 5–25)
PROT/CREAT UR: 0.71 MG/MG CREAT (ref 0–0.17)
SODIUM SERPL-SCNC: 138 MMOL/L (ref 136–145)

## 2024-03-14 PROCEDURE — 82570 ASSAY OF URINE CREATININE: CPT

## 2024-03-14 PROCEDURE — 83970 ASSAY OF PARATHORMONE: CPT

## 2024-03-14 PROCEDURE — 84156 ASSAY OF PROTEIN URINE: CPT

## 2024-03-14 PROCEDURE — 36415 COLL VENOUS BLD VENIPUNCTURE: CPT

## 2024-03-14 PROCEDURE — 82306 VITAMIN D 25 HYDROXY: CPT

## 2024-03-14 PROCEDURE — 80048 BASIC METABOLIC PNL TOTAL CA: CPT

## 2024-03-15 ENCOUNTER — APPOINTMENT (OUTPATIENT)
Dept: PRIMARY CARE | Facility: CLINIC | Age: 79
End: 2024-03-15
Payer: MEDICARE

## 2024-03-15 LAB — PTH-INTACT SERPL-MCNC: 111.7 PG/ML (ref 18.5–88)

## 2024-03-15 RX ORDER — CALCITRIOL 0.25 UG/1
0.25 CAPSULE ORAL EVERY OTHER DAY
Qty: 45 CAPSULE | Refills: 3 | Status: SHIPPED | OUTPATIENT
Start: 2024-03-15 | End: 2025-03-15

## 2024-03-18 ENCOUNTER — APPOINTMENT (OUTPATIENT)
Dept: PRIMARY CARE | Facility: CLINIC | Age: 79
End: 2024-03-18
Payer: MEDICARE

## 2024-03-18 ENCOUNTER — LAB (OUTPATIENT)
Dept: LAB | Facility: CLINIC | Age: 79
End: 2024-03-18
Payer: MEDICARE

## 2024-03-18 ENCOUNTER — INFUSION (OUTPATIENT)
Dept: HEMATOLOGY/ONCOLOGY | Facility: CLINIC | Age: 79
End: 2024-03-18
Payer: MEDICARE

## 2024-03-18 VITALS
RESPIRATION RATE: 18 BRPM | SYSTOLIC BLOOD PRESSURE: 124 MMHG | HEART RATE: 97 BPM | TEMPERATURE: 97.7 F | WEIGHT: 165.34 LBS | BODY MASS INDEX: 26.29 KG/M2 | OXYGEN SATURATION: 95 % | DIASTOLIC BLOOD PRESSURE: 72 MMHG

## 2024-03-18 DIAGNOSIS — D46.Z MDS (MYELODYSPLASTIC SYNDROME), LOW GRADE (MULTI): ICD-10-CM

## 2024-03-18 DIAGNOSIS — D46.1 REFRACTORY ANEMIA WITH RING SIDEROBLASTS (MULTI): ICD-10-CM

## 2024-03-18 LAB
ALBUMIN SERPL BCP-MCNC: 4.5 G/DL (ref 3.4–5)
ALP SERPL-CCNC: 75 U/L (ref 33–136)
ALT SERPL W P-5'-P-CCNC: 15 U/L (ref 10–52)
ANION GAP SERPL CALC-SCNC: 17 MMOL/L (ref 10–20)
AST SERPL W P-5'-P-CCNC: 13 U/L (ref 9–39)
BASOPHILS # BLD AUTO: 0.09 X10*3/UL (ref 0–0.1)
BASOPHILS NFR BLD AUTO: 1.4 %
BILIRUB SERPL-MCNC: 1.3 MG/DL (ref 0–1.2)
BUN SERPL-MCNC: 31 MG/DL (ref 6–23)
CALCIUM SERPL-MCNC: 9.7 MG/DL (ref 8.6–10.3)
CHLORIDE SERPL-SCNC: 97 MMOL/L (ref 98–107)
CO2 SERPL-SCNC: 24 MMOL/L (ref 21–32)
CREAT SERPL-MCNC: 1.96 MG/DL (ref 0.5–1.3)
EGFRCR SERPLBLD CKD-EPI 2021: 34 ML/MIN/1.73M*2
EOSINOPHIL # BLD AUTO: 0.34 X10*3/UL (ref 0–0.4)
EOSINOPHIL NFR BLD AUTO: 5.3 %
ERYTHROCYTE [DISTWIDTH] IN BLOOD BY AUTOMATED COUNT: 18.9 % (ref 11.5–14.5)
GLUCOSE SERPL-MCNC: 486 MG/DL (ref 74–99)
HCT VFR BLD AUTO: 31.8 % (ref 41–52)
HGB BLD-MCNC: 10.7 G/DL (ref 13.5–17.5)
HOLD SPECIMEN: NORMAL
IMM GRANULOCYTES # BLD AUTO: 0.02 X10*3/UL (ref 0–0.5)
IMM GRANULOCYTES NFR BLD AUTO: 0.3 % (ref 0–0.9)
LYMPHOCYTES # BLD AUTO: 1.42 X10*3/UL (ref 0.8–3)
LYMPHOCYTES NFR BLD AUTO: 22.2 %
MCH RBC QN AUTO: 37.3 PG (ref 26–34)
MCHC RBC AUTO-ENTMCNC: 33.6 G/DL (ref 32–36)
MCV RBC AUTO: 111 FL (ref 80–100)
MONOCYTES # BLD AUTO: 0.54 X10*3/UL (ref 0.05–0.8)
MONOCYTES NFR BLD AUTO: 8.4 %
NEUTROPHILS # BLD AUTO: 4 X10*3/UL (ref 1.6–5.5)
NEUTROPHILS NFR BLD AUTO: 62.4 %
PLATELET # BLD AUTO: 151 X10*3/UL (ref 150–450)
POTASSIUM SERPL-SCNC: 4.7 MMOL/L (ref 3.5–5.3)
PROT SERPL-MCNC: 6.7 G/DL (ref 6.4–8.2)
RBC # BLD AUTO: 2.87 X10*6/UL (ref 4.5–5.9)
SODIUM SERPL-SCNC: 133 MMOL/L (ref 136–145)
WBC # BLD AUTO: 6.4 X10*3/UL (ref 4.4–11.3)

## 2024-03-18 PROCEDURE — 96372 THER/PROPH/DIAG INJ SC/IM: CPT | Performed by: INTERNAL MEDICINE

## 2024-03-18 PROCEDURE — 85025 COMPLETE CBC W/AUTO DIFF WBC: CPT

## 2024-03-18 PROCEDURE — 2500000004 HC RX 250 GENERAL PHARMACY W/ HCPCS (ALT 636 FOR OP/ED): Performed by: INTERNAL MEDICINE

## 2024-03-18 PROCEDURE — 80053 COMPREHEN METABOLIC PANEL: CPT

## 2024-03-18 PROCEDURE — 96372 THER/PROPH/DIAG INJ SC/IM: CPT

## 2024-03-18 PROCEDURE — 36415 COLL VENOUS BLD VENIPUNCTURE: CPT

## 2024-03-18 RX ORDER — DIPHENHYDRAMINE HYDROCHLORIDE 50 MG/ML
50 INJECTION INTRAMUSCULAR; INTRAVENOUS AS NEEDED
Status: CANCELLED | OUTPATIENT
Start: 2024-04-08

## 2024-03-18 RX ORDER — FAMOTIDINE 10 MG/ML
20 INJECTION INTRAVENOUS ONCE AS NEEDED
Status: CANCELLED | OUTPATIENT
Start: 2024-04-08

## 2024-03-18 RX ORDER — ALBUTEROL SULFATE 0.83 MG/ML
3 SOLUTION RESPIRATORY (INHALATION) AS NEEDED
Status: CANCELLED | OUTPATIENT
Start: 2024-04-08

## 2024-03-18 RX ORDER — EPINEPHRINE 0.3 MG/.3ML
0.3 INJECTION SUBCUTANEOUS EVERY 5 MIN PRN
Status: CANCELLED | OUTPATIENT
Start: 2024-04-08

## 2024-03-18 RX ADMIN — LUSPATERCEPT 97.5 MG: 75 INJECTION, POWDER, LYOPHILIZED, FOR SOLUTION SUBCUTANEOUS at 13:04

## 2024-03-18 ASSESSMENT — PAIN SCALES - GENERAL: PAINLEVEL: 0-NO PAIN

## 2024-03-19 ENCOUNTER — TREATMENT (OUTPATIENT)
Dept: PHYSICAL THERAPY | Facility: CLINIC | Age: 79
End: 2024-03-19
Payer: MEDICARE

## 2024-03-19 ENCOUNTER — TELEPHONE (OUTPATIENT)
Dept: HEMATOLOGY/ONCOLOGY | Facility: CLINIC | Age: 79
End: 2024-03-19

## 2024-03-19 PROCEDURE — 97110 THERAPEUTIC EXERCISES: CPT | Mod: GP,CQ

## 2024-03-19 NOTE — TELEPHONE ENCOUNTER
I called the pt.  I explained that Dr. Bowser reviewed labs and noted glucose of 486.  I recommended he reach out to PCP or provider who manages glucose.  Pt states his blood sugar is always high when it is not a fasting blood sugar.  He is told of the critical nature of this level.

## 2024-03-19 NOTE — PROGRESS NOTES
"Physical Therapy Treatment    Patient Name: Chas Melgar  MRN: [id number]  Today's Date: 3/19/2024  Time In: 10:00  Time Out: 10:40  Total Time: 40 mins    Current Problem  Trochanteric bursitis    Insurance   *M70.62 (ICD-10-CM) - Trochanteric bursitis of left hip // Caitlin confirmed 2/29/24 2:22pm  MEDICARE/ [facility] 2230($0 USED) COPAY 0 ( MET)  COVERAGE 80/100 OOP 0 [facility] TO FOLLOW MEDICARE  NO AUTH REQ [id number]   Precautions      Subjective : pt reports sleeping poorly due to sleeping on couch, bed is upstairs and stairs are increasing pain reports 1/10 resting pain in L hip. Stiffness in hip in AM.      Pain  1/10 L hip pain      Objective :  Forward head positioning    Treatments:  There Ex:   Bridges on heels with ball squeeze 2x10  TA isometric push with lower abdominal march 2 x 10  Isometric hooklying clamshell RTB 2 x 10  Seated piriformis figure 4 stretch 30\"x3  Modified nadine stretch 2x30 sec  LAQ w/add brace 2 x 10  Standing March 2 x 10  Standing squats 2 x 10  Sit to stand at edge of mat 2 x 10    Ambulate with assistive devicie      Assessment:    Ambulated with rollator today, improved posture and balance during ambulation with device. No increase in pain following treatment. Improving standing posture during standing activities      Plan:     Cont with core stabilization exercises to improve posture.     Treatment performed by Ana Kunz PTA  "

## 2024-03-21 ENCOUNTER — TREATMENT (OUTPATIENT)
Dept: PHYSICAL THERAPY | Facility: CLINIC | Age: 79
End: 2024-03-21
Payer: MEDICARE

## 2024-03-21 DIAGNOSIS — M70.62 TROCHANTERIC BURSITIS OF LEFT HIP: Primary | ICD-10-CM

## 2024-03-21 PROCEDURE — 97110 THERAPEUTIC EXERCISES: CPT | Mod: GP,CQ

## 2024-03-21 NOTE — PROGRESS NOTES
"Physical Therapy Treatment    Patient Name: Chas Melgar  MRN: [id number]  Today's Date: 3/21/2024  Time Calculation  Start Time: 0917  Stop Time: 0955  Time Calculation (min): 38 min  PT Therapeutic Procedures Time Entry  Therapeutic Exercise Time Entry: 38       INSURANCE:  Visit number: 4/10  MDCR    Current Problem  1. Trochanteric bursitis of left hip            Subjective   General   Pt. Reports he has some soreness, but is feeling good overall.   Precautions     Pain       Objective   Treatments:   Bridges on heels with ball squeeze 2x10  TA isometric push with lower abdominal march 2 x 10  Isometric hooklying clamshell RTB 2 x 10  Seated piriformis figure 4 stretch 30\"x3  Modified nadine stretch 2x30 sec  LAQ w/add brace 2 x 10  Standing March 2 x 10  Sit to stand at edge of mat 2 x 10  HR/TR x20  Stand hip 3-way x10    Assessment:   Pt. Progressing well w/ all exercises w/ expected fatigue after treatment. Pt. Stated he just feels like he runs out of breath fast, most likely from all the years he spent smoking. Pt. Stated he felt good leaving therapy today. Pt. Will continue w/ current HEP.     Plan:   Continue w/ current POC.     OP EDUCATION:       Goals:     "

## 2024-03-26 ENCOUNTER — TREATMENT (OUTPATIENT)
Dept: PHYSICAL THERAPY | Facility: CLINIC | Age: 79
End: 2024-03-26
Payer: MEDICARE

## 2024-03-26 PROCEDURE — 97110 THERAPEUTIC EXERCISES: CPT | Mod: GP,CQ

## 2024-03-26 ASSESSMENT — PAIN SCALES - GENERAL: PAINLEVEL_OUTOF10: 0 - NO PAIN

## 2024-03-26 NOTE — PROGRESS NOTES
"Physical Therapy Treatment    Patient Name: Chas Melgar  MRN: [id number]  Today's Date: 3/26/2024  Time Calculation  Start Time: 9:16  Stop Time: 9:54  Time Calculation (min): 38  PT Therapeutic Procedures Time Entry  Therapeutic Exercise Time Entry: 38       INSURANCE:  Visit number: 5 /10  MDCR    Current Problem  1. Trochanteric bursitis of left hip            Subjective   General   Pt. Reports soreness from his exercises but no resting pain.  Precautions     Pain  0/10 resting hip pain, complains of fatigue       Objective   Treatments:   Bridges on heels with ball squeeze 2x10  TA isometric push with lower abdominal march 2 x 10  Isometric hooklying clamshell RTB 2 x 10  Seated piriformis figure 4 stretch 30\"x3  Modified nadine stretch 2x30 sec  LAQ w/add brace 2 x 10  Standing March 2 x 10  Sit to stand at edge of mat 2 x 10  HR/TR x20  Stand hip 3-way x10    Assessment:   Pt. Progressing well w/ all exercises w/ expected fatigue after treatment. Requires cues to avoid substitions during ther ex. No increase in resting pain following treatment.    Plan:   Continue w/ current POC, focus on posture and hip strength.    OP EDUCATION:       Goals:     "

## 2024-03-27 ENCOUNTER — OFFICE VISIT (OUTPATIENT)
Dept: NEPHROLOGY | Facility: CLINIC | Age: 79
End: 2024-03-27
Payer: MEDICARE

## 2024-03-27 VITALS
HEIGHT: 67 IN | BODY MASS INDEX: 25.9 KG/M2 | HEART RATE: 94 BPM | DIASTOLIC BLOOD PRESSURE: 72 MMHG | SYSTOLIC BLOOD PRESSURE: 126 MMHG | WEIGHT: 165 LBS

## 2024-03-27 DIAGNOSIS — E08.22 DIABETES MELLITUS DUE TO UNDERLYING CONDITION WITH DIABETIC CHRONIC KIDNEY DISEASE, UNSPECIFIED CKD STAGE, UNSPECIFIED WHETHER LONG TERM INSULIN USE (MULTI): Primary | ICD-10-CM

## 2024-03-27 DIAGNOSIS — N18.32 STAGE 3B CHRONIC KIDNEY DISEASE (MULTI): ICD-10-CM

## 2024-03-27 DIAGNOSIS — R80.8 OTHER PROTEINURIA: ICD-10-CM

## 2024-03-27 DIAGNOSIS — E21.3 HYPERPARATHYROIDISM (MULTI): ICD-10-CM

## 2024-03-27 DIAGNOSIS — I10 ESSENTIAL HYPERTENSION: ICD-10-CM

## 2024-03-27 PROCEDURE — 1159F MED LIST DOCD IN RCRD: CPT | Performed by: INTERNAL MEDICINE

## 2024-03-27 PROCEDURE — 3074F SYST BP LT 130 MM HG: CPT | Performed by: INTERNAL MEDICINE

## 2024-03-27 PROCEDURE — 99214 OFFICE O/P EST MOD 30 MIN: CPT | Performed by: INTERNAL MEDICINE

## 2024-03-27 PROCEDURE — 1036F TOBACCO NON-USER: CPT | Performed by: INTERNAL MEDICINE

## 2024-03-27 PROCEDURE — 3078F DIAST BP <80 MM HG: CPT | Performed by: INTERNAL MEDICINE

## 2024-03-27 NOTE — PROGRESS NOTES
"Chas Melgar   78 yNiao.    @@  [facility]/Room: [id number]/Room/bed info not found    Subjective:   The patient is being seen for a routine clinic follow-up of chronic kidney disease. Recently, the disease has been stable. Disease complications:  No hyperkalemia, no hypocalcemia, no hyperphosphatemia, no metabolic acidosis, no coagulopathy, no uremic encephalopathy, no neuropathy and no renal osteodystrophy. The patient is currently asymptomatic. No associated symptoms are reported.       Meds:   Current Outpatient Medications   Medication Sig Dispense Refill    aspirin 81 mg EC tablet Take 1 tablet (81 mg) by mouth once daily.      BD Ultra-Fine Short Pen Needle 31 gauge x 5/16\" needle USE ONCE DAILY WITH LEVEMIR 100 each 1    calcitriol (Rocaltrol) 0.25 mcg capsule Take 1 capsule (0.25 mcg) by mouth every other day. 45 capsule 3    cyanocobalamin (Vitamin B-12) 1,000 mcg tablet Take 2 tablets (2,000 mcg) by mouth once daily.      dilTIAZem CD (Cardizem CD) 240 mg 24 hr capsule TAKE 1 CAPSULE BY MOUTH DAILY 90 capsule 3    Farxiga 10 mg TAKE 1 TABLET BY MOUTH EVERY MORNING 90 tablet 1    folic acid 0.8 mg capsule Take 1 tablet by mouth once daily at bedtime.      glimepiride (Amaryl) 4 mg tablet Take 1 tablet (4 mg) by mouth 2 times a day. 180 tablet 1    hydroCHLOROthiazide (HYDRODiuril) 25 mg tablet Take 1 tablet (25 mg) by mouth once daily. 90 tablet 1    insulin glargine (Basaglar KwikPen U-100 Insulin) 100 unit/mL (3 mL) pen Inject 15 Units under the skin once daily at bedtime. 15 mL 2    isosorbide mononitrate ER (Imdur) 30 mg 24 hr tablet Take 1 tablet (30 mg) by mouth once daily. 90 tablet 3    lidocaine 4 % patch Place 1 patch over 12 hours on the skin once daily. Remove & discard patch within 12 hours or as directed by MD. 10 patch 1    lisinopril 30 mg tablet Take 1 tablet (30 mg) by mouth once daily. 90 tablet 1    nitroglycerin (Nitrostat) 0.4 mg SL tablet Place 1 tablet (0.4 mg) under the tongue every " 5 minutes if needed for chest pain. 25 tablet 5    pantoprazole (ProtoNix) 40 mg EC tablet Take 1 tablet (40 mg) by mouth once daily. 90 tablet 3    simvastatin (Zocor) 20 mg tablet Take 1 tablet (20 mg) by mouth once daily at bedtime. 90 tablet 3    tamsulosin (Flomax) 0.4 mg 24 hr capsule Take 1 capsule (0.4 mg) by mouth once daily.       No current facility-administered medications for this visit.          ROS:  The patient is awake and oriented. No dizziness or lightheadedness. No chills and no fever. No headaches. No nausea and no vomiting. No shortness of breath. No cough. No sputum. No chest pain. No chest tightness. No abdominal pain. No diarrhea and no constipation. No hematemesis or hemoptysis. No hematuria. No rectal bleeding. No melena. No epistaxis. No urinary symptoms. No flank pain. No leg edema. No leg pain. No weakness. No itching. Overall, the rest of the review of systems is also negative.  12 point review of systems otherwise negative as stated in HPI.        Physical Examination:        Vitals:    03/27/24 0930   BP: 126/72   Pulse: 94     General: The patient is awake, oriented, and is not in any distress.  Head and Neck: Normocephalic. No periorbital edema.  Eyes: non-icteric  Respiratory: Symmetric air entry. Symmetric chest expansion.No respiratory distress.  Cardiovascular: Symmetric peripheral pulses.  Skin: No maculopapular rash.  Abdomen: soft, nt/nd  Musculoskeletal: No peripheral edema in both left and right upper extremities.  No edema in either left or right lower extremities.  Neuro Exam: Speech is fluent. Moves extremities.    Imaging:  === 09/19/22 ===    US RENAL COMPLETE    - Impression -  No hydronephrosis.    Both kidneys have mildly increased echogenicity, suggesting medical  renal disease. Simple appearing cysts are noted bilaterally.    Enlarged prostate is present. There is postvoid residual volume in  the bladder measuring 60 cc. Bladder wall thickening is  likely  accentuated by under distension, however suggest chronic outflow  obstruction.       Blood Labs:  No results found for this or any previous visit (from the past 24 hour(s)).   Lab Results   Component Value Date    .7 (H) 03/14/2024    PROTUR 66 (H) 06/30/2023    PHOS 4.0 06/30/2023      Lab Results   Component Value Date    GLUCOSE 486 (HH) 03/18/2024    CALCIUM 9.7 03/18/2024     (L) 03/18/2024    K 4.7 03/18/2024    CO2 24 03/18/2024    CL 97 (L) 03/18/2024    BUN 31 (H) 03/18/2024    CREATININE 1.96 (H) 03/18/2024         Assessment and Plan:  1. Chronic kidney disease stage III. Last creatinine level is 1.96.  Overall worse kidney function compared to last year.  Normal potassium and bicarb level.      2. Hypertension. Blood pressure is under control. Continue the current medications.     3. Proteinuria. He has about 710 mg proteinuria. It is most likely because of diabetic nephropathy. He is on lisinopril and Farxigas.  I added Kerendia to his medications.  We talked about low potassium diet and I instructed him to do a basic metabolic panel 3 to 4 weeks after he starts taking Kerendia.    4.  Secondary hyperparathyroidism.  I put him on calcitriol.        I will see him in about 4 months for follow-up.           Siddhartha Burnett MD  Senior Attending Physician  Director of Onco-Nephrology Program  Division of Nephrology & Hypertension  [facility]

## 2024-04-02 ENCOUNTER — TREATMENT (OUTPATIENT)
Dept: PHYSICAL THERAPY | Facility: CLINIC | Age: 79
End: 2024-04-02
Payer: MEDICARE

## 2024-04-02 PROCEDURE — 97110 THERAPEUTIC EXERCISES: CPT | Mod: GP,CQ

## 2024-04-02 ASSESSMENT — PAIN - FUNCTIONAL ASSESSMENT: PAIN_FUNCTIONAL_ASSESSMENT: 0-10

## 2024-04-02 ASSESSMENT — PAIN SCALES - GENERAL: PAINLEVEL_OUTOF10: 0 - NO PAIN

## 2024-04-02 NOTE — PROGRESS NOTES
"Physical Therapy Treatment    Patient Name: Chas Melgar  MRN: [id number]  Today's Date: 4/4/2024       INSURANCE:  [facility]    Current Problem  1. Trochanteric bursitis of left hip          Subjective   Pt states he feels no pain in L hip and only a small amount of pain in back.  He did take tylenol this AM.  Pt states he usually is tired next day after therapy and doesn't do his exs consistently.  Precautions     Pain    Objective   Treatments:  LTR 20x  QS/GS 15x10s ea  Bridges on heels with ball squeeze 2 x 10  PT's 15x10s  TA isometric push with lower abdominal march 2 x 10  (Isometric-NT) hooklying clamshell  GTB 2 x 10  Seated BL piriformis figure 4 stretch 30\"x4  Modified nadine stretch 2 x 30 sec  LAQ w/add brace 2 x 15 2# (no brace today)  Standing March 2 x 10  Sit to stand at edge of mat 2 x 10    NT-  HR/TR x 20  Stand hip 3-way x 10  Partial Squats to mat 2 x 10    Assessment:  Pt able to tolerate tx session well this date w/ no adverse effects noted from tx.  Pt compliant w/ program and appears to understand rationale for therapy.  Still requires skilled therapy for increasing pain reduction     Plan:  Cont w current POC and progress pt as tolerated.       OP EDUCATION:   Cued to do exs more consistently for maximum benefit.  Goals:     "

## 2024-04-02 NOTE — PROGRESS NOTES
"Physical Therapy Treatment    Patient Name: Chas Melgar  MRN: [id number]  Today's Date: 4/1/2024  Time Calculation  Start Time: 9:18  Stop Time: 9:50  Time Calculation (min):  PT Therapeutic Procedures Time Entry  Therapeutic Exercise Time Entry: 32       INSURANCE:  Visit number: 6 /10  MDCR    Current Problem  1. Trochanteric bursitis of left hip            Subjective   General   Pt. Reports soreness from his exercises but no resting pain. States standing from toilet has become much easier  Precautions     Pain  0/10 resting hip pain, reports taking Tylenol for pain this am.       Objective   Treatments:  Bridges on heels with ball squeeze 2 x 10  TA isometric push with lower abdominal march 2 x 10  Isometric hooklying clamshell  GTB 2 x 10  Seated piriformis figure 4 stretch 30\"x3  Modified nadine stretch 2 x 30 sec  LAQ w/add brace 2 x 10 2#  Standing March 2 x 10  Sit to stand at edge of mat 2 x 10  HR/TR x 20  Stand hip 3-way x 10  Partial Squats to mat 2 x 10    Assessment:   Pt. Progressing well w/ all exercises w/ expected fatigue after treatment. Requires cues to avoid substitions during ther ex. No increase in resting pain following treatment. Patient able to tolerate standing activities with no increase in fatigue    Plan:   Continue w/ current POC, focus on posture and hip strength.    OP EDUCATION:       Goals:     "

## 2024-04-04 ENCOUNTER — TREATMENT (OUTPATIENT)
Dept: PHYSICAL THERAPY | Facility: CLINIC | Age: 79
End: 2024-04-04
Payer: MEDICARE

## 2024-04-04 DIAGNOSIS — M70.62 TROCHANTERIC BURSITIS OF LEFT HIP: Primary | ICD-10-CM

## 2024-04-04 PROCEDURE — 97110 THERAPEUTIC EXERCISES: CPT | Mod: GP

## 2024-04-04 ASSESSMENT — PAIN - FUNCTIONAL ASSESSMENT: PAIN_FUNCTIONAL_ASSESSMENT: 0-10

## 2024-04-08 ENCOUNTER — INFUSION (OUTPATIENT)
Dept: HEMATOLOGY/ONCOLOGY | Facility: CLINIC | Age: 79
End: 2024-04-08
Payer: MEDICARE

## 2024-04-08 ENCOUNTER — LAB (OUTPATIENT)
Dept: LAB | Facility: CLINIC | Age: 79
End: 2024-04-08
Payer: MEDICARE

## 2024-04-08 VITALS
HEART RATE: 75 BPM | SYSTOLIC BLOOD PRESSURE: 130 MMHG | BODY MASS INDEX: 26.74 KG/M2 | DIASTOLIC BLOOD PRESSURE: 65 MMHG | OXYGEN SATURATION: 96 % | TEMPERATURE: 97.2 F | RESPIRATION RATE: 16 BRPM | WEIGHT: 168.21 LBS

## 2024-04-08 DIAGNOSIS — D46.1 REFRACTORY ANEMIA WITH RING SIDEROBLASTS (MULTI): ICD-10-CM

## 2024-04-08 DIAGNOSIS — D46.Z MDS (MYELODYSPLASTIC SYNDROME), LOW GRADE (MULTI): ICD-10-CM

## 2024-04-08 LAB
ALBUMIN SERPL BCP-MCNC: 4.4 G/DL (ref 3.4–5)
ALP SERPL-CCNC: 69 U/L (ref 33–136)
ALT SERPL W P-5'-P-CCNC: 15 U/L (ref 10–52)
ANION GAP SERPL CALC-SCNC: 14 MMOL/L (ref 10–20)
AST SERPL W P-5'-P-CCNC: 12 U/L (ref 9–39)
BASOPHILS # BLD AUTO: 0.08 X10*3/UL (ref 0–0.1)
BASOPHILS NFR BLD AUTO: 1.5 %
BILIRUB SERPL-MCNC: 1.3 MG/DL (ref 0–1.2)
BUN SERPL-MCNC: 35 MG/DL (ref 6–23)
CALCIUM SERPL-MCNC: 9 MG/DL (ref 8.6–10.3)
CHLORIDE SERPL-SCNC: 104 MMOL/L (ref 98–107)
CO2 SERPL-SCNC: 24 MMOL/L (ref 21–32)
CREAT SERPL-MCNC: 1.8 MG/DL (ref 0.5–1.3)
EGFRCR SERPLBLD CKD-EPI 2021: 38 ML/MIN/1.73M*2
EOSINOPHIL # BLD AUTO: 0.46 X10*3/UL (ref 0–0.4)
EOSINOPHIL NFR BLD AUTO: 8.4 %
ERYTHROCYTE [DISTWIDTH] IN BLOOD BY AUTOMATED COUNT: 19.1 % (ref 11.5–14.5)
GLUCOSE SERPL-MCNC: 295 MG/DL (ref 74–99)
HCT VFR BLD AUTO: 32.1 % (ref 41–52)
HGB BLD-MCNC: 11 G/DL (ref 13.5–17.5)
IMM GRANULOCYTES # BLD AUTO: 0.01 X10*3/UL (ref 0–0.5)
IMM GRANULOCYTES NFR BLD AUTO: 0.2 % (ref 0–0.9)
LYMPHOCYTES # BLD AUTO: 1.97 X10*3/UL (ref 0.8–3)
LYMPHOCYTES NFR BLD AUTO: 35.8 %
MCH RBC QN AUTO: 38.2 PG (ref 26–34)
MCHC RBC AUTO-ENTMCNC: 34.3 G/DL (ref 32–36)
MCV RBC AUTO: 112 FL (ref 80–100)
MONOCYTES # BLD AUTO: 0.47 X10*3/UL (ref 0.05–0.8)
MONOCYTES NFR BLD AUTO: 8.5 %
NEUTROPHILS # BLD AUTO: 2.51 X10*3/UL (ref 1.6–5.5)
NEUTROPHILS NFR BLD AUTO: 45.6 %
PLATELET # BLD AUTO: 147 X10*3/UL (ref 150–450)
POTASSIUM SERPL-SCNC: 4.1 MMOL/L (ref 3.5–5.3)
PROT SERPL-MCNC: 6.8 G/DL (ref 6.4–8.2)
RBC # BLD AUTO: 2.88 X10*6/UL (ref 4.5–5.9)
SODIUM SERPL-SCNC: 138 MMOL/L (ref 136–145)
WBC # BLD AUTO: 5.5 X10*3/UL (ref 4.4–11.3)

## 2024-04-08 PROCEDURE — 2500000004 HC RX 250 GENERAL PHARMACY W/ HCPCS (ALT 636 FOR OP/ED): Performed by: INTERNAL MEDICINE

## 2024-04-08 PROCEDURE — 36415 COLL VENOUS BLD VENIPUNCTURE: CPT

## 2024-04-08 PROCEDURE — 84075 ASSAY ALKALINE PHOSPHATASE: CPT

## 2024-04-08 PROCEDURE — 96372 THER/PROPH/DIAG INJ SC/IM: CPT | Performed by: INTERNAL MEDICINE

## 2024-04-08 PROCEDURE — 96372 THER/PROPH/DIAG INJ SC/IM: CPT

## 2024-04-08 PROCEDURE — 85025 COMPLETE CBC W/AUTO DIFF WBC: CPT

## 2024-04-08 RX ORDER — ALBUTEROL SULFATE 0.83 MG/ML
3 SOLUTION RESPIRATORY (INHALATION) AS NEEDED
Status: CANCELLED | OUTPATIENT
Start: 2024-04-29

## 2024-04-08 RX ORDER — DIPHENHYDRAMINE HYDROCHLORIDE 50 MG/ML
50 INJECTION INTRAMUSCULAR; INTRAVENOUS AS NEEDED
Status: DISCONTINUED | OUTPATIENT
Start: 2024-04-08 | End: 2024-04-08 | Stop reason: HOSPADM

## 2024-04-08 RX ORDER — FAMOTIDINE 10 MG/ML
20 INJECTION INTRAVENOUS ONCE AS NEEDED
Status: CANCELLED | OUTPATIENT
Start: 2024-04-29

## 2024-04-08 RX ORDER — EPINEPHRINE 0.3 MG/.3ML
0.3 INJECTION SUBCUTANEOUS EVERY 5 MIN PRN
Status: CANCELLED | OUTPATIENT
Start: 2024-04-29

## 2024-04-08 RX ORDER — EPINEPHRINE 0.3 MG/.3ML
0.3 INJECTION SUBCUTANEOUS EVERY 5 MIN PRN
Status: DISCONTINUED | OUTPATIENT
Start: 2024-04-08 | End: 2024-04-08 | Stop reason: HOSPADM

## 2024-04-08 RX ORDER — FAMOTIDINE 10 MG/ML
20 INJECTION INTRAVENOUS ONCE AS NEEDED
Status: DISCONTINUED | OUTPATIENT
Start: 2024-04-08 | End: 2024-04-08 | Stop reason: HOSPADM

## 2024-04-08 RX ORDER — ALBUTEROL SULFATE 0.83 MG/ML
3 SOLUTION RESPIRATORY (INHALATION) AS NEEDED
Status: DISCONTINUED | OUTPATIENT
Start: 2024-04-08 | End: 2024-04-08 | Stop reason: HOSPADM

## 2024-04-08 RX ORDER — DIPHENHYDRAMINE HYDROCHLORIDE 50 MG/ML
50 INJECTION INTRAMUSCULAR; INTRAVENOUS AS NEEDED
Status: CANCELLED | OUTPATIENT
Start: 2024-04-29

## 2024-04-08 RX ADMIN — LUSPATERCEPT 102.5 MG: 75 INJECTION, POWDER, LYOPHILIZED, FOR SOLUTION SUBCUTANEOUS at 12:48

## 2024-04-08 ASSESSMENT — PAIN SCALES - GENERAL: PAINLEVEL: 0-NO PAIN

## 2024-04-09 ENCOUNTER — TREATMENT (OUTPATIENT)
Dept: PHYSICAL THERAPY | Facility: CLINIC | Age: 79
End: 2024-04-09
Payer: MEDICARE

## 2024-04-09 PROCEDURE — 97110 THERAPEUTIC EXERCISES: CPT | Mod: GP,CQ

## 2024-04-09 ASSESSMENT — PAIN - FUNCTIONAL ASSESSMENT: PAIN_FUNCTIONAL_ASSESSMENT: 0-10

## 2024-04-09 ASSESSMENT — PAIN SCALES - GENERAL: PAINLEVEL_OUTOF10: 1

## 2024-04-09 NOTE — PROGRESS NOTES
"Physical Therapy Treatment    Patient Name: Chas Melgar  MRN: [id number]  Today's Date: 4/9/2024  Time Calculation  Start Time: 9:54  Stop Time: 10:29  Time Calculation (min): 35 min  PT Therapeutic Procedures Time Entry  Therapeutic Exercise Time Entry: 35       INSURANCE:  Visit number: 7/10  MDCR    Current Problem  1. Trochanteric bursitis of left hip            Subjective   General   Pt. Reports soreness with certain activities. Minimal resting pain  Precautions     Pain  1/10 resting hip pain, reports he was restless and didn't sleep well last night       Objective   Treatments:  Bridges on heels with ball squeeze 2 x 10  TA isometric push with lower abdominal march 2 x 10  Isometric hooklying clamshell  GTB 2 x 10  Seated piriformis figure 4 stretch 30\"x3  Modified nadine stretch 2 x 30 sec  LAQ w/add brace 2 x 10 2#  Standing March 2 x 10  Sit to stand at edge of mat 2 x 10  Elevated HR/TR x 20  Stand hip 3-way x 10  Partial Squats to mat 2 x 10   Step ups 4\" x 5 ea    Assessment:   No increase in resting pain following treatment. Continues to require cues to perform there ex with proper technique. Fatigued during standing activities.    Plan:   Continue w/ current POC, focus on posture and hip strength.    OP EDUCATION:       Goals:     "

## 2024-04-11 ENCOUNTER — TREATMENT (OUTPATIENT)
Dept: PHYSICAL THERAPY | Facility: CLINIC | Age: 79
End: 2024-04-11
Payer: MEDICARE

## 2024-04-11 DIAGNOSIS — M70.62 TROCHANTERIC BURSITIS OF LEFT HIP: Primary | ICD-10-CM

## 2024-04-11 PROCEDURE — 97110 THERAPEUTIC EXERCISES: CPT | Mod: GP,CQ

## 2024-04-11 NOTE — PROGRESS NOTES
"Physical Therapy Treatment    Patient Name: Chas Melgar  MRN: [id number]  Today's Date: 4/11/2024  Time Calculation  Start Time: 0915  Stop Time: 0947  Time Calculation (min): 32 min  PT Therapeutic Procedures Time Entry  Therapeutic Exercise Time Entry: 30       INSURANCE:  Visit number: 7/10  MDCR    Current Problem  1. Trochanteric bursitis of left hip            Subjective   General   Pt. Reports he has some soreness coming in.   Precautions     Pain       Objective   Treatments:   Bridges on heels with ball squeeze 2 x 10  TA isometric push with lower abdominal march 2 x 10  Isometric hooklying clamshell  GTB 5\" 2 x 10  Seated piriformis figure 4 stretch 30\"x3  Modified nadine stretch 2 x 30 sec  LAQ w/add brace 2 x 10 2#  Standing March 2 x 10  Sit to stand at edge of mat 2 x 10  Elevated HR/TR x 20  Stand hip 3-way 2 x 10  Partial Squats to mat 2 x 10   Step ups F 4\" 2x10     Assessment:   Added reps to stand hip 3-way and step ups for increasing strength w/ good pt. Tolerance. Pt. Still having quick fatigue w/ most exercises d/t weakness. Pt. Will continue to benefit from skilled PT for increasing strength/endurance.     Plan:   Continue w/ current POC.     OP EDUCATION:       Goals:     "

## 2024-04-16 ENCOUNTER — TREATMENT (OUTPATIENT)
Dept: PHYSICAL THERAPY | Facility: CLINIC | Age: 79
End: 2024-04-16
Payer: MEDICARE

## 2024-04-16 DIAGNOSIS — M70.62 TROCHANTERIC BURSITIS OF LEFT HIP: Primary | ICD-10-CM

## 2024-04-16 PROCEDURE — 97112 NEUROMUSCULAR REEDUCATION: CPT | Mod: GP | Performed by: PHYSICAL THERAPIST

## 2024-04-16 PROCEDURE — 97110 THERAPEUTIC EXERCISES: CPT | Mod: GP | Performed by: PHYSICAL THERAPIST

## 2024-04-16 NOTE — PROGRESS NOTES
Physical Therapy Treatment    Patient Name: Chas Melgar  MRN: [id number]  Today's Date: 4/16/2024  Time Calculation  Start Time: 0950  Stop Time: 1035  Time Calculation (min): 45 min       PT Therapeutic Procedures Time Entry  Neuromuscular Re-Education Time Entry: 8  Therapeutic Exercise Time Entry: 30                   [place]  [place]  [facility]    CURRENT PROBLEM:  1. Trochanteric bursitis of left hip          SUBJECTIVE:   General -   Pt is doing home exercises.  More stiff in the morning; sleeping on the couch right now.  Moving to an apartment in June and all the bedrooms are upstairs right now.  Getting up off the couch and toilet are easier since starting therapy.    Pain -    Pain Location: Lumbar and L hip  Pain Best: 0/10  Pain Today: 0/10 now, 4/10 this morning   Pain Worst: 4/10     Precautions -    Fall Risk     OBJECTIVE:   Hip AROM (degrees) - WFL grossly except reduced ext     Hip MMT (/5) -  R hip Flexion: 4+  R hip ER: 4   R hip IR: 4 with slight pain  R hip Abduction: 4  L hip Flexion: 4  L hip ER: 4  L hip IR: 4 with slight pain  L hip Abduction: 4     Lumbar AROM by Percent -  Lumbar Flexion: 90%  Lumbar Extension: 20%     Posture -   Rounded Shoulders  Forward Head  Lower Crossed Syndrome  Increased Lumbar Lordosis/APT  Forward Trunk Lean     Functional Assessment/Special Tests -  BULL positive bilateral  Mingo positive bilateral     Gait -  Pt is ambulating with a Rolator walker but able to perform without an assistive device.  Noted: antalgic, decreased heel strike, decreased toe push off, reduced jo and step length, Trendelenburg, and trunk lean.     Outcome Measures -   Tinetti  Sitting Balance: Steady, safe  Arises: Able without using arms  Attempts to Arise: Able to arise, one attempt  Immediate Standing Balance (First 5 Seconds): Steady without walker or other support  Standing Balance: Steady but wide stance, uses cane or other support  Nudged: Steady without  "walker or other support  Eyes Closed: Steady  Turned 360 Degrees: Steadiness: Steady  Turned 360 Degrees: Continuity of Steps: Discontinuous steps  Sitting Down: Uses arms or not a smooth motion  Balance Score: 13  Initiation of Gait: No hesitancy  Step Height: R Swing Foot: Right foot does not clear floor completely with step  Step Length: R Swing Foot: Passes left stance foot  Step Height: L Swing Foot: Left foot does not clear floor completely with step  Step Length: L Swing Foot: Passes right stance foot  Step Symmetry: Right and left step appear equal  Step Continuity: Stopping or discontinuity between steps  Path: Mild/moderate deviation or uses walking aid  Trunk: Marked sway or uses walking aid  Walking Time: Heels apart  Gait Score: 5  Total Score: 18    Other Measures  Lower Extremity Funtional Score (LEFS): 30 (/80)    Treatment -   Therapeutic Exercise (95265) -  Assessment  Standing hip flexor stretch: 30 sec ea (hold)  Standing hip AB: 2x10 ea   Prone: 2 min  Isometric hooklying clamshell: BTB 5\" 2x15  Bridges on heels with ball squeeze 2x10  Seated piriformis figure 4 stretch 30\"x2  Sit to stand at edge of mat 2x10  TA isometric push with lower abdominal march --  LAQ w/add brace 2# --  Standing March --  Elevated HR/TR --  Stand hip flexion/extensions: --  Partial Squats to mat --  Step ups F 4\" --  Modified nadine stretch --    ASSESSMENT:     Pt is progressing towards his goals and demonstrates improvements with both ROM and strength of B hips and lumbar spine at this time. Pt reports an increased ability to perform ADLs, but still exhibits deficits as shown by his LEFS score. Pt will benefit from continued skilled PT in order to further improve ROM and strength of the pelvis/B hips and lumbar spine so that the pt can perform ADLs without pain and return to PLOF.  Pt was educated to start laying on his stomach at home for about 2-3 min for decreased anterior hip tightness. Pt tolerated session well " and is progressing towards functional needs. Continue to progress pt within tolerance and POC.    PLAN:   Treatments/Interventions: traction, gait training, edema control, education/instruction, home program, self care/home management, manual therapy, neuromuscular re-education, therapeutic activities, therapeutic exercises, modalities, therapeutic elastic taping.   OP PT Plan  PT Plan: Skilled PT  Duration: 1-2 times a week for 6 visits  Certification Period Start Date: 04/16/24  Certification Period End Date: 07/15/24  Rehab Potential: Good  Plan of Care Agreement: Patient    Goals -   By discharge pt will achieve the following goals:   Pt will report less than a 2/10 pain at the worst. (Goal PROGRESSING)    Pt will report a significant improvement with  LEFS score. (Goal PROGRESSING)    Pt will have at least 4+/5 strength for the bilateral hips. (Goal PROGRESSING)    Pt will have AROM to WFL for the bilateral hips and lumbar spine. (Goal PROGRESSING)  Pt will demonstrated an improvement with Tinetti Gait and Balance score by 4 points to improve static/dynamic balance and reduce fall risk. (NEW GOAL)  Pt will be independent with HEP. (Goal PROGRESSING)

## 2024-04-17 ENCOUNTER — PATIENT MESSAGE (OUTPATIENT)
Dept: NEPHROLOGY | Facility: CLINIC | Age: 79
End: 2024-04-17
Payer: MEDICARE

## 2024-04-17 DIAGNOSIS — N18.32 STAGE 3B CHRONIC KIDNEY DISEASE (MULTI): ICD-10-CM

## 2024-04-17 RX ORDER — DAPAGLIFLOZIN 10 MG/1
10 TABLET, FILM COATED ORAL
Qty: 90 TABLET | Refills: 1 | Status: SHIPPED | OUTPATIENT
Start: 2024-04-17

## 2024-04-18 ENCOUNTER — OFFICE VISIT (OUTPATIENT)
Dept: PRIMARY CARE | Facility: CLINIC | Age: 79
End: 2024-04-18
Payer: MEDICARE

## 2024-04-18 VITALS
SYSTOLIC BLOOD PRESSURE: 137 MMHG | HEIGHT: 67 IN | WEIGHT: 170 LBS | TEMPERATURE: 96.8 F | DIASTOLIC BLOOD PRESSURE: 67 MMHG | HEART RATE: 79 BPM | BODY MASS INDEX: 26.68 KG/M2

## 2024-04-18 DIAGNOSIS — Z79.4 DIABETES MELLITUS DUE TO UNDERLYING CONDITION WITH HYPERGLYCEMIA, WITH LONG-TERM CURRENT USE OF INSULIN (MULTI): ICD-10-CM

## 2024-04-18 DIAGNOSIS — E66.3 OVERWEIGHT WITH BODY MASS INDEX (BMI) OF 27 TO 27.9 IN ADULT: ICD-10-CM

## 2024-04-18 DIAGNOSIS — E78.2 MIXED HYPERLIPIDEMIA: ICD-10-CM

## 2024-04-18 DIAGNOSIS — I10 ESSENTIAL HYPERTENSION: ICD-10-CM

## 2024-04-18 DIAGNOSIS — Z87.891 FORMER SMOKER: ICD-10-CM

## 2024-04-18 DIAGNOSIS — E08.65 DIABETES MELLITUS DUE TO UNDERLYING CONDITION WITH HYPERGLYCEMIA, WITH LONG-TERM CURRENT USE OF INSULIN (MULTI): ICD-10-CM

## 2024-04-18 PROCEDURE — 1159F MED LIST DOCD IN RCRD: CPT | Performed by: FAMILY MEDICINE

## 2024-04-18 PROCEDURE — 99213 OFFICE O/P EST LOW 20 MIN: CPT | Performed by: FAMILY MEDICINE

## 2024-04-18 PROCEDURE — 3075F SYST BP GE 130 - 139MM HG: CPT | Performed by: FAMILY MEDICINE

## 2024-04-18 PROCEDURE — 3078F DIAST BP <80 MM HG: CPT | Performed by: FAMILY MEDICINE

## 2024-04-18 PROCEDURE — 1036F TOBACCO NON-USER: CPT | Performed by: FAMILY MEDICINE

## 2024-04-18 PROCEDURE — 1160F RVW MEDS BY RX/DR IN RCRD: CPT | Performed by: FAMILY MEDICINE

## 2024-04-18 ASSESSMENT — ENCOUNTER SYMPTOMS
RESPIRATORY NEGATIVE: 1
PSYCHIATRIC NEGATIVE: 1
EYES NEGATIVE: 1
MUSCULOSKELETAL NEGATIVE: 1
CARDIOVASCULAR NEGATIVE: 1
CONSTITUTIONAL NEGATIVE: 1
ENDOCRINE NEGATIVE: 1
GASTROINTESTINAL NEGATIVE: 1
ALLERGIC/IMMUNOLOGIC NEGATIVE: 1
HEMATOLOGIC/LYMPHATIC NEGATIVE: 1

## 2024-04-18 NOTE — PROGRESS NOTES
Subjective     Patient ID: Chas Melgar is a 79 y.o. male who presents for Follow-up:    Problem List Items Addressed This Visit    None     Past Medical History:   Diagnosis Date    Acute gastric ulcer with hemorrhage 09/07/2022    Acute gastric ulcer with bleeding    Acute upper respiratory infection, unspecified     URI, acute    Arthritis     Body mass index (BMI) 28.0-28.9, adult 10/19/2021    BMI 28.0-28.9,adult    Cancer (Multi)     Coronary artery disease     Diabetes mellitus (Multi)     Encounter for other preprocedural examination 10/14/2021    Pre-operative clearance    Encounter for screening for malignant neoplasm of prostate     Encounter for prostate cancer screening    Impingement syndrome of unspecified shoulder 07/22/2021    Impingement syndrome of shoulder region, unspecified laterality    Other abnormalities of breathing     Difficulty breathing    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)     Other specified disorders of pancreatic internal secretion    Other specified follicular disorders     Actinic folliculitis    Other specified postprocedural states 06/09/2021    Status post arthroscopy of left shoulder    Overweight 02/21/2022    Overweight with body mass index (BMI) of 29 to 29.9 in adult    Overweight 01/18/2022    Overweight with body mass index (BMI) of 28 to 28.9 in adult    Pain in right hip     Hip pain, right    Pain in right leg     Pain of right lower extremity    Pain in unspecified hip 08/04/2021    Hip joint pain    Pain in unspecified shoulder 08/10/2021    Shoulder pain    Personal history of malignant neoplasm, unspecified 08/04/2021    History of malignant neoplasm    Personal history of other diseases of male genital organs     History of benign prostatic hyperplasia    Personal history of other diseases of the circulatory system 08/04/2021    History of hypertension    Personal history of other diseases of the circulatory system  08/04/2021    History of cardiac disorder    Personal history of other diseases of the digestive system 10/14/2021    History of gastrointestinal hemorrhage    Personal history of other diseases of the digestive system 02/26/2022    History of right inguinal hernia    Personal history of other diseases of the digestive system     History of fatty infiltration of liver    Personal history of other diseases of the musculoskeletal system and connective tissue 08/04/2021    History of arthritis    Personal history of other diseases of the musculoskeletal system and connective tissue 08/05/2021    History of rotator cuff tear    Personal history of other diseases of the respiratory system 11/17/2021    History of upper respiratory infection    Personal history of other diseases of the respiratory system     History of acute bronchitis    Personal history of other diseases of the respiratory system     History of acute pharyngitis    Personal history of other diseases of the respiratory system     History of acute sinusitis    Personal history of other diseases of urinary system 09/01/2022    History of renal insufficiency syndrome    Personal history of other endocrine, nutritional and metabolic disease 10/06/2021    History of diabetes mellitus    Personal history of other endocrine, nutritional and metabolic disease 01/18/2022    History of hyperkalemia    Personal history of other malignant neoplasm of skin     History of other malignant neoplasm of skin    Personal history of other specified conditions 09/01/2022    History of chest pain    Personal history of other specified conditions     History of dysuria    Personal history of other specified conditions     History of chest pain    Persons encountering health services in other specified circumstances     Encounter for support and coordination of transition of care    Primary osteoarthritis, 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    DJD of left shoulder    Right lower quadrant  pain 2022    Right inguinal pain    Strain of muscle, fascia and tendon of lower back, initial encounter     Lumbar strain    Strain of muscle, fascia and tendon of other parts of biceps, right arm, initial encounter 10/14/2021    Rupture of right biceps tendon, initial encounter    Trochanteric bursitis, left hip 08/10/2021    Trochanteric bursitis of left hip      Past Surgical History:   Procedure Laterality Date    CT AORTA AND BILATERAL ILIOFEMORAL RUNOFF ANGIOGRAM W AND/OR WO IV CONTRAST  10/27/2020    CT AORTA AND BILATERAL ILIOFEMORAL RUNOFF ANGIOGRAM W AND/OR WO IV CONTRAST 10/27/2020 [facility]    OTHER SURGICAL HISTORY  2022    Colonoscopy    OTHER SURGICAL HISTORY  10/14/2021    Cardiac catheterization with stent placement    OTHER SURGICAL HISTORY  10/14/2021    PTA femoral artery    OTHER SURGICAL HISTORY  10/14/2021    Rotator cuff repair    OTHER SURGICAL HISTORY  10/14/2021    Inguinal hernia repair    OTHER SURGICAL HISTORY  10/14/2021    Knee arthroscopy    OTHER SURGICAL HISTORY  2021    Skin biopsy    ROTATOR CUFF REPAIR        Family History   Problem Relation Name Age of Onset    Diabetes Mother Melinda andino     Coronary artery disease Father      Hypertension Other      Alzheimer's disease Other        Social History     Socioeconomic History    Marital status:      Spouse name: Not on file    Number of children: Not on file    Years of education: Not on file    Highest education level: Not on file   Occupational History    Not on file   Tobacco Use    Smoking status: Former     Current packs/day: 0.00     Average packs/day: 2.0 packs/day for 20.0 years (40.0 ttl pk-yrs)     Types: Cigarettes     Start date: 1966     Quit date: 1986     Years since quittin.3    Smokeless tobacco: Never   Substance and Sexual Activity    Alcohol use: Yes     Alcohol/week: 2.0 standard drinks of alcohol     Types: 2 Standard drinks or equivalent per week    Drug  "use: Never    Sexual activity: Not Currently     Partners: Female   Other Topics Concern    Not on file   Social History Narrative    Not on file     Social Determinants of Health     Financial Resource Strain: Not on file   Food Insecurity: Not on file   Transportation Needs: Not on file   Physical Activity: Not on file   Stress: Not on file   Social Connections: Not on file   Intimate Partner Violence: Not on file   Housing Stability: Not on file      Aluminum and Nickel   Current Outpatient Medications   Medication Sig Dispense Refill    aspirin 81 mg EC tablet Take 1 tablet (81 mg) by mouth once daily.      BD Ultra-Fine Short Pen Needle 31 gauge x 5/16\" needle USE ONCE DAILY WITH LEVEMIR 100 each 1    calcitriol (Rocaltrol) 0.25 mcg capsule Take 1 capsule (0.25 mcg) by mouth every other day. 45 capsule 3    cyanocobalamin (Vitamin B-12) 1,000 mcg tablet Take 2 tablets (2,000 mcg) by mouth once daily.      dapagliflozin propanediol (Farxiga) 10 mg Take 1 tablet (10 mg) by mouth once daily in the morning. Take before meals. 90 tablet 1    dilTIAZem CD (Cardizem CD) 240 mg 24 hr capsule TAKE 1 CAPSULE BY MOUTH DAILY 90 capsule 3    folic acid 0.8 mg capsule Take 1 tablet by mouth once daily at bedtime.      glimepiride (Amaryl) 4 mg tablet Take 1 tablet (4 mg) by mouth 2 times a day. 180 tablet 1    hydroCHLOROthiazide (HYDRODiuril) 25 mg tablet Take 1 tablet (25 mg) by mouth once daily. 90 tablet 1    insulin glargine (Basaglar KwikPen U-100 Insulin) 100 unit/mL (3 mL) pen Inject 15 Units under the skin once daily at bedtime. 15 mL 2    isosorbide mononitrate ER (Imdur) 30 mg 24 hr tablet Take 1 tablet (30 mg) by mouth once daily. 90 tablet 3    lidocaine 4 % patch Place 1 patch over 12 hours on the skin once daily. Remove & discard patch within 12 hours or as directed by MD. 10 patch 1    lisinopril 30 mg tablet Take 1 tablet (30 mg) by mouth once daily. 90 tablet 1    nitroglycerin (Nitrostat) 0.4 mg SL tablet " Place 1 tablet (0.4 mg) under the tongue every 5 minutes if needed for chest pain. 25 tablet 5    pantoprazole (ProtoNix) 40 mg EC tablet Take 1 tablet (40 mg) by mouth once daily. 90 tablet 3    simvastatin (Zocor) 20 mg tablet Take 1 tablet (20 mg) by mouth once daily at bedtime. 90 tablet 3    tamsulosin (Flomax) 0.4 mg 24 hr capsule Take 1 capsule (0.4 mg) by mouth once daily.       No current facility-administered medications for this visit.       Immunization History   Administered Date(s) Administered    Flu vaccine, quadrivalent, high-dose, preservative free, age 65y+ (FLUZONE) 10/28/2020, 11/10/2023    Hep A / Hep B 05/08/2014, 06/16/2014, 11/12/2014    Influenza, High Dose Seasonal, Preservative Free 11/16/2017, 09/20/2018, 11/29/2019    Influenza, Unspecified 10/01/2011, 10/03/2012, 10/01/2013, 10/01/2014, 10/01/2015, 10/14/2016    Pfizer Purple Cap SARS-CoV-2 02/27/2021, 03/20/2021, 01/22/2022    Pneumococcal conjugate vaccine, 13-valent (PREVNAR 13) 11/30/2016    Pneumococcal polysaccharide vaccine, 23-valent, age 2 years and older (PNEUMOVAX 23) 06/19/2011        Review of Systems     Vitals:    04/18/24 1342   BP: 137/67   Pulse: 79   Temp: 36 °C (96.8 °F)     Vitals:    04/18/24 1342   Weight: 77.1 kg (170 lb)      Physical Exam     ASSESSMENT/PLAN:         Scribe Attestation  By signing my name below, I, Orly Alvarenga LPN, Scribe   attest that this documentation has been prepared under the direction and in the presence of Jimmy Helm MD.

## 2024-04-18 NOTE — PROGRESS NOTES
Pat Hua is here for follow-up on diabetes and hypertension.  He states that he has been overall feeling well.  He has no new complaints at the present time.  He sees Dr. Goodwin for his cardiology care and Dr. Burnett for nephrology care.  He also sees Dr. Bowser for hematology care of his chronic anemia.  He continues on his medications as noted.    Patient ID: Chas Melgar is a 79 y.o. male who presents for Follow-up:    Problem List Items Addressed This Visit       Essential hypertension    Mixed hyperlipidemia    Former smoker    Overweight with body mass index (BMI) of 27 to 27.9 in adult    Diabetes mellitus due to underlying condition with hyperglycemia, with long-term current use of insulin (Multi)      Past Medical History:   Diagnosis Date    Acute gastric ulcer with hemorrhage 09/07/2022    Acute gastric ulcer with bleeding    Acute upper respiratory infection, unspecified     URI, acute    Arthritis     Body mass index (BMI) 28.0-28.9, adult 10/19/2021    BMI 28.0-28.9,adult    Cancer (Multi)     Coronary artery disease     Diabetes mellitus (Multi)     Encounter for other preprocedural examination 10/14/2021    Pre-operative clearance    Encounter for screening for malignant neoplasm of prostate     Encounter for prostate cancer screening    Impingement syndrome of unspecified shoulder 07/22/2021    Impingement syndrome of shoulder region, unspecified laterality    Other abnormalities of breathing     Difficulty breathing    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)     Other specified disorders of pancreatic internal secretion    Other specified follicular disorders     Actinic folliculitis    Other specified postprocedural states 06/09/2021    Status post arthroscopy of left shoulder    Overweight 02/21/2022    Overweight with body mass index (BMI) of 29 to 29.9 in adult    Overweight 01/18/2022    Overweight with body mass index (BMI) of 28 to 28.9 in adult     Pain in right hip     Hip pain, right    Pain in right leg     Pain of right lower extremity    Pain in unspecified hip 08/04/2021    Hip joint pain    Pain in unspecified shoulder 08/10/2021    Shoulder pain    Personal history of malignant neoplasm, unspecified 08/04/2021    History of malignant neoplasm    Personal history of other diseases of male genital organs     History of benign prostatic hyperplasia    Personal history of other diseases of the circulatory system 08/04/2021    History of hypertension    Personal history of other diseases of the circulatory system 08/04/2021    History of cardiac disorder    Personal history of other diseases of the digestive system 10/14/2021    History of gastrointestinal hemorrhage    Personal history of other diseases of the digestive system 02/26/2022    History of right inguinal hernia    Personal history of other diseases of the digestive system     History of fatty infiltration of liver    Personal history of other diseases of the musculoskeletal system and connective tissue 08/04/2021    History of arthritis    Personal history of other diseases of the musculoskeletal system and connective tissue 08/05/2021    History of rotator cuff tear    Personal history of other diseases of the respiratory system 11/17/2021    History of upper respiratory infection    Personal history of other diseases of the respiratory system     History of acute bronchitis    Personal history of other diseases of the respiratory system     History of acute pharyngitis    Personal history of other diseases of the respiratory system     History of acute sinusitis    Personal history of other diseases of urinary system 09/01/2022    History of renal insufficiency syndrome    Personal history of other endocrine, nutritional and metabolic disease 10/06/2021    History of diabetes mellitus    Personal history of other endocrine, nutritional and metabolic disease 01/18/2022    History of  hyperkalemia    Personal history of other malignant neoplasm of skin     History of other malignant neoplasm of skin    Personal history of other specified conditions 09/01/2022    History of chest pain    Personal history of other specified conditions     History of dysuria    Personal history of other specified conditions     History of chest pain    Persons encountering health services in other specified circumstances     Encounter for support and coordination of transition of care    Primary osteoarthritis, 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    DJD of left shoulder    Right lower quadrant pain 02/26/2022    Right inguinal pain    Strain of muscle, fascia and tendon of lower back, initial encounter     Lumbar strain    Strain of muscle, fascia and tendon of other parts of biceps, right arm, initial encounter 10/14/2021    Rupture of right biceps tendon, initial encounter    Trochanteric bursitis, left hip 08/10/2021    Trochanteric bursitis of left hip      Past Surgical History:   Procedure Laterality Date    CT AORTA AND BILATERAL ILIOFEMORAL RUNOFF ANGIOGRAM W AND/OR WO IV CONTRAST  10/27/2020    CT AORTA AND BILATERAL ILIOFEMORAL RUNOFF ANGIOGRAM W AND/OR WO IV CONTRAST 10/27/2020 [facility]    OTHER SURGICAL HISTORY  11/07/2022    Colonoscopy    OTHER SURGICAL HISTORY  10/14/2021    Cardiac catheterization with stent placement    OTHER SURGICAL HISTORY  10/14/2021    PTA femoral artery    OTHER SURGICAL HISTORY  10/14/2021    Rotator cuff repair    OTHER SURGICAL HISTORY  10/14/2021    Inguinal hernia repair    OTHER SURGICAL HISTORY  10/14/2021    Knee arthroscopy    OTHER SURGICAL HISTORY  11/17/2021    Skin biopsy    ROTATOR CUFF REPAIR        Family History   Problem Relation Name Age of Onset    Diabetes Mother Melinda andino     Coronary artery disease Father      Hypertension Other      Alzheimer's disease Other        Social History     Socioeconomic History    Marital status:      Spouse  "name: Not on file    Number of children: Not on file    Years of education: Not on file    Highest education level: Not on file   Occupational History    Not on file   Tobacco Use    Smoking status: Former     Current packs/day: 0.00     Average packs/day: 2.0 packs/day for 20.0 years (40.0 ttl pk-yrs)     Types: Cigarettes     Start date: 1966     Quit date: 1986     Years since quittin.3    Smokeless tobacco: Never   Substance and Sexual Activity    Alcohol use: Yes     Alcohol/week: 2.0 standard drinks of alcohol     Types: 2 Standard drinks or equivalent per week    Drug use: Never    Sexual activity: Not Currently     Partners: Female   Other Topics Concern    Not on file   Social History Narrative    Not on file     Social Determinants of Health     Financial Resource Strain: Not on file   Food Insecurity: Not on file   Transportation Needs: Not on file   Physical Activity: Not on file   Stress: Not on file   Social Connections: Not on file   Intimate Partner Violence: Not on file   Housing Stability: Not on file      Aluminum and Nickel   Current Outpatient Medications   Medication Sig Dispense Refill    aspirin 81 mg EC tablet Take 1 tablet (81 mg) by mouth once daily.      BD Ultra-Fine Short Pen Needle 31 gauge x 5/16\" needle USE ONCE DAILY WITH LEVEMIR 100 each 1    calcitriol (Rocaltrol) 0.25 mcg capsule Take 1 capsule (0.25 mcg) by mouth every other day. 45 capsule 3    cyanocobalamin (Vitamin B-12) 1,000 mcg tablet Take 2 tablets (2,000 mcg) by mouth once daily.      dapagliflozin propanediol (Farxiga) 10 mg Take 1 tablet (10 mg) by mouth once daily in the morning. Take before meals. 90 tablet 1    dilTIAZem CD (Cardizem CD) 240 mg 24 hr capsule TAKE 1 CAPSULE BY MOUTH DAILY 90 capsule 3    folic acid 0.8 mg capsule Take 1 tablet by mouth once daily at bedtime.      glimepiride (Amaryl) 4 mg tablet Take 1 tablet (4 mg) by mouth 2 times a day. 180 tablet 1    hydroCHLOROthiazide " (HYDRODiuril) 25 mg tablet Take 1 tablet (25 mg) by mouth once daily. 90 tablet 1    insulin glargine (Basaglar KwikPen U-100 Insulin) 100 unit/mL (3 mL) pen Inject 15 Units under the skin once daily at bedtime. 15 mL 2    isosorbide mononitrate ER (Imdur) 30 mg 24 hr tablet Take 1 tablet (30 mg) by mouth once daily. 90 tablet 3    lidocaine 4 % patch Place 1 patch over 12 hours on the skin once daily. Remove & discard patch within 12 hours or as directed by MD. 10 patch 1    lisinopril 30 mg tablet Take 1 tablet (30 mg) by mouth once daily. 90 tablet 1    nitroglycerin (Nitrostat) 0.4 mg SL tablet Place 1 tablet (0.4 mg) under the tongue every 5 minutes if needed for chest pain. 25 tablet 5    pantoprazole (ProtoNix) 40 mg EC tablet Take 1 tablet (40 mg) by mouth once daily. 90 tablet 3    simvastatin (Zocor) 20 mg tablet Take 1 tablet (20 mg) by mouth once daily at bedtime. 90 tablet 3    tamsulosin (Flomax) 0.4 mg 24 hr capsule Take 1 capsule (0.4 mg) by mouth once daily.       No current facility-administered medications for this visit.       Immunization History   Administered Date(s) Administered    Flu vaccine, quadrivalent, high-dose, preservative free, age 65y+ (FLUZONE) 10/28/2020, 11/10/2023    Hep A / Hep B 05/08/2014, 06/16/2014, 11/12/2014    Influenza, High Dose Seasonal, Preservative Free 11/16/2017, 09/20/2018, 11/29/2019    Influenza, Unspecified 10/01/2011, 10/03/2012, 10/01/2013, 10/01/2014, 10/01/2015, 10/14/2016    Pfizer Purple Cap SARS-CoV-2 02/27/2021, 03/20/2021, 01/22/2022    Pneumococcal conjugate vaccine, 13-valent (PREVNAR 13) 11/30/2016    Pneumococcal polysaccharide vaccine, 23-valent, age 2 years and older (PNEUMOVAX 23) 06/19/2011        Review of Systems   Constitutional: Negative.    HENT: Negative.     Eyes: Negative.    Respiratory: Negative.     Cardiovascular: Negative.    Gastrointestinal: Negative.    Endocrine: Negative.    Genitourinary: Negative.    Musculoskeletal:  Negative.    Skin: Negative.    Allergic/Immunologic: Negative.    Hematological: Negative.    Psychiatric/Behavioral: Negative.     All other systems reviewed and are negative.       Vitals:    04/18/24 1342   BP: 137/67   Pulse: 79   Temp: 36 °C (96.8 °F)     Vitals:    04/18/24 1342   Weight: 77.1 kg (170 lb)      Physical Exam  Constitutional:       General: He is not in acute distress.     Appearance: Normal appearance.   Cardiovascular:      Rate and Rhythm: Normal rate and regular rhythm.      Pulses: Normal pulses.      Heart sounds: Murmur heard.      No friction rub. No gallop.   Pulmonary:      Effort: Pulmonary effort is normal. No respiratory distress.      Breath sounds: Normal breath sounds. No wheezing or rales.   Musculoskeletal:      Right lower leg: No edema.      Left lower leg: Edema present.   Neurological:      Mental Status: He is alert.     Heart-regular S1-S2 with a 2/6 systolic ejection murmur at the left sternal border  Lower legs-he has less than 1+ pitting edema of his left lower leg and no edema in the right lower leg.    ASSESSMENT/PLAN: Diabetes mellitus type 2.  Check CMP and A1c.  Eye exams are up-to-date.  Continue current meds as noted    Hypertension stable.  Continue current meds    Chronic kidney disease followed by Dr. Burnett    Hyperlipidemia.  Check lipid profile    Chronic anemia followed by hematology    Carotid vascular disease and peripheral vascular disease followed by vascular surgery and cardiology    Follow-up with above consultants as noted.  Try to stay as active as possible within his limitations.  Follow-up in 4 months and call as needed

## 2024-04-22 ENCOUNTER — LAB (OUTPATIENT)
Dept: LAB | Facility: LAB | Age: 79
End: 2024-04-22
Payer: MEDICARE

## 2024-04-22 DIAGNOSIS — E78.2 MIXED HYPERLIPIDEMIA: ICD-10-CM

## 2024-04-22 DIAGNOSIS — E08.65 DIABETES MELLITUS DUE TO UNDERLYING CONDITION WITH HYPERGLYCEMIA, WITH LONG-TERM CURRENT USE OF INSULIN (MULTI): ICD-10-CM

## 2024-04-22 DIAGNOSIS — I10 ESSENTIAL HYPERTENSION: ICD-10-CM

## 2024-04-22 DIAGNOSIS — E66.3 OVERWEIGHT WITH BODY MASS INDEX (BMI) OF 27 TO 27.9 IN ADULT: ICD-10-CM

## 2024-04-22 DIAGNOSIS — Z79.4 DIABETES MELLITUS DUE TO UNDERLYING CONDITION WITH HYPERGLYCEMIA, WITH LONG-TERM CURRENT USE OF INSULIN (MULTI): ICD-10-CM

## 2024-04-22 LAB
ALBUMIN SERPL BCP-MCNC: 4.7 G/DL (ref 3.4–5)
ALP SERPL-CCNC: 73 U/L (ref 33–136)
ALT SERPL W P-5'-P-CCNC: 15 U/L (ref 10–52)
ANION GAP SERPL CALC-SCNC: 14 MMOL/L (ref 10–20)
AST SERPL W P-5'-P-CCNC: 13 U/L (ref 9–39)
BILIRUB SERPL-MCNC: 1.6 MG/DL (ref 0–1.2)
BUN SERPL-MCNC: 34 MG/DL (ref 6–23)
CALCIUM SERPL-MCNC: 9.1 MG/DL (ref 8.6–10.3)
CHLORIDE SERPL-SCNC: 103 MMOL/L (ref 98–107)
CHOLEST SERPL-MCNC: 98 MG/DL (ref 0–199)
CHOLESTEROL/HDL RATIO: 2.3
CO2 SERPL-SCNC: 27 MMOL/L (ref 21–32)
CREAT SERPL-MCNC: 1.85 MG/DL (ref 0.5–1.3)
EGFRCR SERPLBLD CKD-EPI 2021: 36 ML/MIN/1.73M*2
EST. AVERAGE GLUCOSE BLD GHB EST-MCNC: 217 MG/DL
GLUCOSE SERPL-MCNC: 242 MG/DL (ref 74–99)
HBA1C MFR BLD: 9.2 %
HDLC SERPL-MCNC: 42.6 MG/DL
LDLC SERPL CALC-MCNC: 38 MG/DL
NON HDL CHOLESTEROL: 55 MG/DL (ref 0–149)
POTASSIUM SERPL-SCNC: 4.3 MMOL/L (ref 3.5–5.3)
PROT SERPL-MCNC: 6.6 G/DL (ref 6.4–8.2)
SODIUM SERPL-SCNC: 140 MMOL/L (ref 136–145)
TRIGL SERPL-MCNC: 87 MG/DL (ref 0–149)
VLDL: 17 MG/DL (ref 0–40)

## 2024-04-22 PROCEDURE — 80061 LIPID PANEL: CPT

## 2024-04-22 PROCEDURE — 83036 HEMOGLOBIN GLYCOSYLATED A1C: CPT

## 2024-04-22 PROCEDURE — 80053 COMPREHEN METABOLIC PANEL: CPT

## 2024-04-22 PROCEDURE — 36415 COLL VENOUS BLD VENIPUNCTURE: CPT

## 2024-04-23 ENCOUNTER — TELEPHONE (OUTPATIENT)
Dept: PRIMARY CARE | Facility: CLINIC | Age: 79
End: 2024-04-23
Payer: MEDICARE

## 2024-04-23 NOTE — TELEPHONE ENCOUNTER
----- Message from Jimmy Helm MD sent at 4/22/2024  5:45 PM EDT -----  Sugar control worse with A1C 9.2.. Sched OV 2 weeks

## 2024-04-28 DIAGNOSIS — D46.Z MDS (MYELODYSPLASTIC SYNDROME), LOW GRADE (MULTI): Primary | ICD-10-CM

## 2024-04-28 DIAGNOSIS — D46.1 REFRACTORY ANEMIA WITH RING SIDEROBLASTS (MULTI): ICD-10-CM

## 2024-04-29 ENCOUNTER — INFUSION (OUTPATIENT)
Dept: HEMATOLOGY/ONCOLOGY | Facility: CLINIC | Age: 79
End: 2024-04-29
Payer: MEDICARE

## 2024-04-29 ENCOUNTER — LAB (OUTPATIENT)
Dept: LAB | Facility: CLINIC | Age: 79
End: 2024-04-29
Payer: MEDICARE

## 2024-04-29 VITALS
WEIGHT: 169.31 LBS | OXYGEN SATURATION: 97 % | DIASTOLIC BLOOD PRESSURE: 61 MMHG | RESPIRATION RATE: 18 BRPM | TEMPERATURE: 96.6 F | HEART RATE: 90 BPM | SYSTOLIC BLOOD PRESSURE: 102 MMHG | BODY MASS INDEX: 26.92 KG/M2

## 2024-04-29 DIAGNOSIS — D46.1 REFRACTORY ANEMIA WITH RING SIDEROBLASTS (MULTI): ICD-10-CM

## 2024-04-29 DIAGNOSIS — E21.3 HYPERPARATHYROIDISM (MULTI): ICD-10-CM

## 2024-04-29 DIAGNOSIS — E08.22 DIABETES MELLITUS DUE TO UNDERLYING CONDITION WITH DIABETIC CHRONIC KIDNEY DISEASE, UNSPECIFIED CKD STAGE, UNSPECIFIED WHETHER LONG TERM INSULIN USE (MULTI): ICD-10-CM

## 2024-04-29 DIAGNOSIS — D46.Z MDS (MYELODYSPLASTIC SYNDROME), LOW GRADE (MULTI): ICD-10-CM

## 2024-04-29 DIAGNOSIS — R80.8 OTHER PROTEINURIA: ICD-10-CM

## 2024-04-29 DIAGNOSIS — I10 ESSENTIAL HYPERTENSION: ICD-10-CM

## 2024-04-29 DIAGNOSIS — N18.32 STAGE 3B CHRONIC KIDNEY DISEASE (MULTI): ICD-10-CM

## 2024-04-29 LAB
ALBUMIN SERPL BCP-MCNC: 4.6 G/DL (ref 3.4–5)
ALP SERPL-CCNC: 69 U/L (ref 33–136)
ALT SERPL W P-5'-P-CCNC: 16 U/L (ref 10–52)
ANION GAP SERPL CALC-SCNC: 15 MMOL/L (ref 10–20)
AST SERPL W P-5'-P-CCNC: 12 U/L (ref 9–39)
BASO STIPL BLD QL SMEAR: PRESENT
BASOPHILS # BLD AUTO: 0.12 X10*3/UL (ref 0–0.1)
BASOPHILS NFR BLD AUTO: 1.5 %
BILIRUB SERPL-MCNC: 1.3 MG/DL (ref 0–1.2)
BUN SERPL-MCNC: 29 MG/DL (ref 6–23)
CALCIUM SERPL-MCNC: 9.3 MG/DL (ref 8.6–10.3)
CHLORIDE SERPL-SCNC: 102 MMOL/L (ref 98–107)
CO2 SERPL-SCNC: 24 MMOL/L (ref 21–32)
CREAT SERPL-MCNC: 1.92 MG/DL (ref 0.5–1.3)
EGFRCR SERPLBLD CKD-EPI 2021: 35 ML/MIN/1.73M*2
EOSINOPHIL # BLD AUTO: 0.72 X10*3/UL (ref 0–0.4)
EOSINOPHIL NFR BLD AUTO: 9 %
ERYTHROCYTE [DISTWIDTH] IN BLOOD BY AUTOMATED COUNT: 19.8 % (ref 11.5–14.5)
GLUCOSE SERPL-MCNC: 259 MG/DL (ref 74–99)
HCT VFR BLD AUTO: 33.2 % (ref 41–52)
HGB BLD-MCNC: 11.2 G/DL (ref 13.5–17.5)
HOLD SPECIMEN: NORMAL
IMM GRANULOCYTES # BLD AUTO: 0.02 X10*3/UL (ref 0–0.5)
IMM GRANULOCYTES NFR BLD AUTO: 0.2 % (ref 0–0.9)
LYMPHOCYTES # BLD AUTO: 2.7 X10*3/UL (ref 0.8–3)
LYMPHOCYTES NFR BLD AUTO: 33.6 %
MCH RBC QN AUTO: 37.6 PG (ref 26–34)
MCHC RBC AUTO-ENTMCNC: 33.7 G/DL (ref 32–36)
MCV RBC AUTO: 111 FL (ref 80–100)
MONOCYTES # BLD AUTO: 0.62 X10*3/UL (ref 0.05–0.8)
MONOCYTES NFR BLD AUTO: 7.7 %
NEUTROPHILS # BLD AUTO: 3.85 X10*3/UL (ref 1.6–5.5)
NEUTROPHILS NFR BLD AUTO: 48 %
NRBC BLD-RTO: ABNORMAL /100{WBCS}
OVALOCYTES BLD QL SMEAR: NORMAL
PLATELET # BLD AUTO: 222 X10*3/UL (ref 150–450)
POLYCHROMASIA BLD QL SMEAR: NORMAL
POTASSIUM SERPL-SCNC: 4.4 MMOL/L (ref 3.5–5.3)
PROT SERPL-MCNC: 6.8 G/DL (ref 6.4–8.2)
RBC # BLD AUTO: 2.98 X10*6/UL (ref 4.5–5.9)
RBC MORPH BLD: NORMAL
SODIUM SERPL-SCNC: 137 MMOL/L (ref 136–145)
STOMATOCYTES BLD QL SMEAR: NORMAL
TARGETS BLD QL SMEAR: NORMAL
WBC # BLD AUTO: 8 X10*3/UL (ref 4.4–11.3)

## 2024-04-29 PROCEDURE — 80053 COMPREHEN METABOLIC PANEL: CPT

## 2024-04-29 PROCEDURE — 36415 COLL VENOUS BLD VENIPUNCTURE: CPT

## 2024-04-29 PROCEDURE — 85025 COMPLETE CBC W/AUTO DIFF WBC: CPT

## 2024-04-29 PROCEDURE — 96372 THER/PROPH/DIAG INJ SC/IM: CPT

## 2024-04-29 PROCEDURE — 2500000004 HC RX 250 GENERAL PHARMACY W/ HCPCS (ALT 636 FOR OP/ED): Performed by: INTERNAL MEDICINE

## 2024-04-29 RX ORDER — DIPHENHYDRAMINE HYDROCHLORIDE 50 MG/ML
50 INJECTION INTRAMUSCULAR; INTRAVENOUS AS NEEDED
Status: DISCONTINUED | OUTPATIENT
Start: 2024-04-29 | End: 2024-04-29 | Stop reason: HOSPADM

## 2024-04-29 RX ORDER — FAMOTIDINE 10 MG/ML
20 INJECTION INTRAVENOUS ONCE AS NEEDED
Status: CANCELLED | OUTPATIENT
Start: 2024-05-20

## 2024-04-29 RX ORDER — FAMOTIDINE 10 MG/ML
20 INJECTION INTRAVENOUS ONCE AS NEEDED
Status: DISCONTINUED | OUTPATIENT
Start: 2024-04-29 | End: 2024-04-29 | Stop reason: HOSPADM

## 2024-04-29 RX ORDER — ALBUTEROL SULFATE 0.83 MG/ML
3 SOLUTION RESPIRATORY (INHALATION) AS NEEDED
Status: CANCELLED | OUTPATIENT
Start: 2024-05-20

## 2024-04-29 RX ORDER — EPINEPHRINE 0.3 MG/.3ML
0.3 INJECTION SUBCUTANEOUS EVERY 5 MIN PRN
Status: DISCONTINUED | OUTPATIENT
Start: 2024-04-29 | End: 2024-04-29 | Stop reason: HOSPADM

## 2024-04-29 RX ORDER — ALBUTEROL SULFATE 0.83 MG/ML
3 SOLUTION RESPIRATORY (INHALATION) AS NEEDED
Status: DISCONTINUED | OUTPATIENT
Start: 2024-04-29 | End: 2024-04-29 | Stop reason: HOSPADM

## 2024-04-29 RX ORDER — EPINEPHRINE 0.3 MG/.3ML
0.3 INJECTION SUBCUTANEOUS EVERY 5 MIN PRN
Status: CANCELLED | OUTPATIENT
Start: 2024-05-20

## 2024-04-29 RX ORDER — DIPHENHYDRAMINE HYDROCHLORIDE 50 MG/ML
50 INJECTION INTRAMUSCULAR; INTRAVENOUS AS NEEDED
Status: CANCELLED | OUTPATIENT
Start: 2024-05-20

## 2024-04-29 RX ADMIN — LUSPATERCEPT 102.5 MG: 75 INJECTION, POWDER, LYOPHILIZED, FOR SOLUTION SUBCUTANEOUS at 14:15

## 2024-04-29 ASSESSMENT — PAIN SCALES - GENERAL: PAINLEVEL: 0-NO PAIN

## 2024-04-29 NOTE — PATIENT INSTRUCTIONS
"Per Dr Jimmy Helm :   \"Decrease hydrochlorothiazide to one half tab per day\" and he will see you next week. Please call PCP if weakness continues or worsens or any other new/concerning symptoms.   "

## 2024-04-30 ENCOUNTER — TREATMENT (OUTPATIENT)
Dept: PHYSICAL THERAPY | Facility: CLINIC | Age: 79
End: 2024-04-30
Payer: MEDICARE

## 2024-04-30 PROCEDURE — 97110 THERAPEUTIC EXERCISES: CPT | Mod: GP,CQ

## 2024-04-30 ASSESSMENT — PAIN SCALES - GENERAL: PAINLEVEL_OUTOF10: 0 - NO PAIN

## 2024-04-30 ASSESSMENT — PAIN - FUNCTIONAL ASSESSMENT: PAIN_FUNCTIONAL_ASSESSMENT: 0-10

## 2024-04-30 NOTE — PROGRESS NOTES
"Physical Therapy Treatment    Patient Name: Chas Melgar  MRN: [id number]  Today's Date: 4/30/2024  Time Calculation  Start Time: 10:44 am  Stop Time: 11:17 AM  Time Calculation (min):  33 min       PT Therapeutic Procedures Time Entry  Neuromuscular Re-Education Time Entry:   Therapeutic Exercise Time Entry:      INSURANCE:  Visit number: 10/10, 1/12  MDCR    CURRENT PROBLEM:  1. Trochanteric bursitis of left hip          SUBJECTIVE:   General -   Reports he is very tired today. No increase in pain.     Pain -    Pain Location: Lumbar and L hip  Pain Best: 0/10  Pain Today: 0/10   Pain Worst: 4/10     Precautions -    Fall Risk     OBJECTIVE:     Stands with forward head and rounded shoulder posture     Treatment -   Therapeutic Exercise (41084) -  Assessment  Standing hip flexor stretch: 30 sec ea (hold)  Standing hip AB: 2x10 ea   Prone: --  Isometric hooklying clamshell: BTB 5\" 2x15  Bridges on heels with ball squeeze 2x10  Seated piriformis figure 4 stretch 30\"x2  Sit to stand at edge of mat 2 x 10  TA isometric push with lower abdominal march --  LAQ w/add brace 2# 2 x10  Standing March 2 X 10  Elevated HR/TR 2 x 10  Stand hip flexion/extensions: 2 x 10  Partial Squats to mat --  Step ups F 4\" --  Modified nadine stretch --    ASSESSMENT:     Increased fatigue today limited treatment. Required frequent rest breaks and requested limited activity due to fatigue. Required assistance to maintain balance in standing this date. No increase in pain. Fatigued following treatment.    PLAN:   Continue with gait training and balance activities to improve safety during ADL's.      "

## 2024-05-06 ENCOUNTER — OFFICE VISIT (OUTPATIENT)
Dept: PRIMARY CARE | Facility: CLINIC | Age: 79
End: 2024-05-06
Payer: MEDICARE

## 2024-05-06 VITALS
WEIGHT: 170 LBS | DIASTOLIC BLOOD PRESSURE: 68 MMHG | HEART RATE: 80 BPM | TEMPERATURE: 97.7 F | HEIGHT: 67 IN | BODY MASS INDEX: 26.68 KG/M2 | SYSTOLIC BLOOD PRESSURE: 131 MMHG

## 2024-05-06 DIAGNOSIS — Z87.891 FORMER SMOKER: ICD-10-CM

## 2024-05-06 DIAGNOSIS — Z79.84 DIABETES MELLITUS TREATED WITH INSULIN AND ORAL MEDICATION (MULTI): ICD-10-CM

## 2024-05-06 DIAGNOSIS — E08.65 DIABETES MELLITUS DUE TO UNDERLYING CONDITION WITH HYPERGLYCEMIA, WITH LONG-TERM CURRENT USE OF INSULIN (MULTI): ICD-10-CM

## 2024-05-06 DIAGNOSIS — Z79.4 DIABETES MELLITUS TREATED WITH INSULIN AND ORAL MEDICATION (MULTI): ICD-10-CM

## 2024-05-06 DIAGNOSIS — Z79.4 DIABETES MELLITUS DUE TO UNDERLYING CONDITION WITH HYPERGLYCEMIA, WITH LONG-TERM CURRENT USE OF INSULIN (MULTI): ICD-10-CM

## 2024-05-06 DIAGNOSIS — E11.9 DIABETES MELLITUS TREATED WITH INSULIN AND ORAL MEDICATION (MULTI): ICD-10-CM

## 2024-05-06 DIAGNOSIS — E78.2 MIXED HYPERLIPIDEMIA: ICD-10-CM

## 2024-05-06 DIAGNOSIS — E66.3 OVERWEIGHT WITH BODY MASS INDEX (BMI) OF 27 TO 27.9 IN ADULT: ICD-10-CM

## 2024-05-06 DIAGNOSIS — K11.7 DROOLING: ICD-10-CM

## 2024-05-06 DIAGNOSIS — E04.1 THYROID NODULE: Primary | ICD-10-CM

## 2024-05-06 DIAGNOSIS — I10 ESSENTIAL HYPERTENSION: ICD-10-CM

## 2024-05-06 DIAGNOSIS — R05.3 CHRONIC COUGH: ICD-10-CM

## 2024-05-06 PROCEDURE — 3078F DIAST BP <80 MM HG: CPT | Performed by: FAMILY MEDICINE

## 2024-05-06 PROCEDURE — 99213 OFFICE O/P EST LOW 20 MIN: CPT | Performed by: FAMILY MEDICINE

## 2024-05-06 PROCEDURE — 1160F RVW MEDS BY RX/DR IN RCRD: CPT | Performed by: FAMILY MEDICINE

## 2024-05-06 PROCEDURE — 3075F SYST BP GE 130 - 139MM HG: CPT | Performed by: FAMILY MEDICINE

## 2024-05-06 PROCEDURE — 1036F TOBACCO NON-USER: CPT | Performed by: FAMILY MEDICINE

## 2024-05-06 PROCEDURE — 1159F MED LIST DOCD IN RCRD: CPT | Performed by: FAMILY MEDICINE

## 2024-05-06 RX ORDER — INSULIN GLARGINE 100 [IU]/ML
20 INJECTION, SOLUTION SUBCUTANEOUS NIGHTLY
Qty: 15 ML | Refills: 2 | Status: SHIPPED | OUTPATIENT
Start: 2024-05-06 | End: 2024-05-08

## 2024-05-06 NOTE — PROGRESS NOTES
Pat Hua is here accompanied by his wife for follow-up on his diabetes.  He continues on his medications as noted.  His wife states that his diet has been very erratic over the last several months.  He misses meals on a regular basis and does not follow any type of sugar or carbohydrate restriction.  She notes that he has had coughing with choking episodes for the last several months.  These are intermittent in nature.  He denies any GERD symptoms and he does continue on pantoprazole 40 mg daily.  He has no history of asthma and he quit smoking in 1986.  He is also on lisinopril 30 mg daily.  Because of a lower blood pressure his HCTZ was decreased to 12.5 mg daily.  He has no other complaints at the present time.    Patient ID: Chas Melgar is a 79 y.o. male who presents for Results:    Problem List Items Addressed This Visit    None     Past Medical History:   Diagnosis Date    Acute gastric ulcer with hemorrhage 09/07/2022    Acute gastric ulcer with bleeding    Acute upper respiratory infection, unspecified     URI, acute    Arthritis     Body mass index (BMI) 28.0-28.9, adult 10/19/2021    BMI 28.0-28.9,adult    Cancer (Multi)     Coronary artery disease     Diabetes mellitus (Multi)     Encounter for other preprocedural examination 10/14/2021    Pre-operative clearance    Encounter for screening for malignant neoplasm of prostate     Encounter for prostate cancer screening    Impingement syndrome of unspecified shoulder 07/22/2021    Impingement syndrome of shoulder region, unspecified laterality    Other abnormalities of breathing     Difficulty breathing    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)     Other specified disorders of pancreatic internal secretion    Other specified follicular disorders     Actinic folliculitis    Other specified postprocedural states 06/09/2021    Status post arthroscopy of left shoulder    Overweight 02/21/2022    Overweight with body  mass index (BMI) of 29 to 29.9 in adult    Overweight 01/18/2022    Overweight with body mass index (BMI) of 28 to 28.9 in adult    Pain in right hip     Hip pain, right    Pain in right leg     Pain of right lower extremity    Pain in unspecified hip 08/04/2021    Hip joint pain    Pain in unspecified shoulder 08/10/2021    Shoulder pain    Personal history of malignant neoplasm, unspecified 08/04/2021    History of malignant neoplasm    Personal history of other diseases of male genital organs     History of benign prostatic hyperplasia    Personal history of other diseases of the circulatory system 08/04/2021    History of hypertension    Personal history of other diseases of the circulatory system 08/04/2021    History of cardiac disorder    Personal history of other diseases of the digestive system 10/14/2021    History of gastrointestinal hemorrhage    Personal history of other diseases of the digestive system 02/26/2022    History of right inguinal hernia    Personal history of other diseases of the digestive system     History of fatty infiltration of liver    Personal history of other diseases of the musculoskeletal system and connective tissue 08/04/2021    History of arthritis    Personal history of other diseases of the musculoskeletal system and connective tissue 08/05/2021    History of rotator cuff tear    Personal history of other diseases of the respiratory system 11/17/2021    History of upper respiratory infection    Personal history of other diseases of the respiratory system     History of acute bronchitis    Personal history of other diseases of the respiratory system     History of acute pharyngitis    Personal history of other diseases of the respiratory system     History of acute sinusitis    Personal history of other diseases of urinary system 09/01/2022    History of renal insufficiency syndrome    Personal history of other endocrine, nutritional and metabolic disease 10/06/2021     History of diabetes mellitus    Personal history of other endocrine, nutritional and metabolic disease 01/18/2022    History of hyperkalemia    Personal history of other malignant neoplasm of skin     History of other malignant neoplasm of skin    Personal history of other specified conditions 09/01/2022    History of chest pain    Personal history of other specified conditions     History of dysuria    Personal history of other specified conditions     History of chest pain    Persons encountering health services in other specified circumstances     Encounter for support and coordination of transition of care    Primary osteoarthritis, 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    DJD of left shoulder    Right lower quadrant pain 02/26/2022    Right inguinal pain    Strain of muscle, fascia and tendon of lower back, initial encounter     Lumbar strain    Strain of muscle, fascia and tendon of other parts of biceps, right arm, initial encounter 10/14/2021    Rupture of right biceps tendon, initial encounter    Trochanteric bursitis, left hip 08/10/2021    Trochanteric bursitis of left hip      Past Surgical History:   Procedure Laterality Date    CT AORTA AND BILATERAL ILIOFEMORAL RUNOFF ANGIOGRAM W AND/OR WO IV CONTRAST  10/27/2020    CT AORTA AND BILATERAL ILIOFEMORAL RUNOFF ANGIOGRAM W AND/OR WO IV CONTRAST 10/27/2020 [facility]    OTHER SURGICAL HISTORY  11/07/2022    Colonoscopy    OTHER SURGICAL HISTORY  10/14/2021    Cardiac catheterization with stent placement    OTHER SURGICAL HISTORY  10/14/2021    PTA femoral artery    OTHER SURGICAL HISTORY  10/14/2021    Rotator cuff repair    OTHER SURGICAL HISTORY  10/14/2021    Inguinal hernia repair    OTHER SURGICAL HISTORY  10/14/2021    Knee arthroscopy    OTHER SURGICAL HISTORY  11/17/2021    Skin biopsy    ROTATOR CUFF REPAIR        Family History   Problem Relation Name Age of Onset    Diabetes Mother Melinda andino     Coronary artery disease Father      Hypertension  "Other      Alzheimer's disease Other        Social History     Socioeconomic History    Marital status:      Spouse name: Not on file    Number of children: Not on file    Years of education: Not on file    Highest education level: Not on file   Occupational History    Not on file   Tobacco Use    Smoking status: Former     Current packs/day: 0.00     Average packs/day: 2.0 packs/day for 20.0 years (40.0 ttl pk-yrs)     Types: Cigarettes     Start date: 1966     Quit date: 1986     Years since quittin.3    Smokeless tobacco: Never   Substance and Sexual Activity    Alcohol use: Yes     Alcohol/week: 2.0 standard drinks of alcohol     Types: 2 Standard drinks or equivalent per week    Drug use: Never    Sexual activity: Not Currently     Partners: Female   Other Topics Concern    Not on file   Social History Narrative    Not on file     Social Determinants of Health     Financial Resource Strain: Not on file   Food Insecurity: Not on file   Transportation Needs: Not on file   Physical Activity: Not on file   Stress: Not on file   Social Connections: Not on file   Intimate Partner Violence: Not on file   Housing Stability: Not on file      Aluminum and Nickel   Current Outpatient Medications   Medication Sig Dispense Refill    aspirin 81 mg EC tablet Take 1 tablet (81 mg) by mouth once daily.      BD Ultra-Fine Short Pen Needle 31 gauge x 5/16\" needle USE ONCE DAILY WITH LEVEMIR 100 each 1    calcitriol (Rocaltrol) 0.25 mcg capsule Take 1 capsule (0.25 mcg) by mouth every other day. 45 capsule 3    cyanocobalamin (Vitamin B-12) 1,000 mcg tablet Take 2 tablets (2,000 mcg) by mouth once daily.      dapagliflozin propanediol (Farxiga) 10 mg Take 1 tablet (10 mg) by mouth once daily in the morning. Take before meals. 90 tablet 1    dilTIAZem CD (Cardizem CD) 240 mg 24 hr capsule TAKE 1 CAPSULE BY MOUTH DAILY 90 capsule 3    folic acid 0.8 mg capsule Take 1 tablet by mouth once daily at bedtime.      " glimepiride (Amaryl) 4 mg tablet Take 1 tablet (4 mg) by mouth 2 times a day. 180 tablet 1    hydroCHLOROthiazide (HYDRODiuril) 25 mg tablet Take 1 tablet (25 mg) by mouth once daily. (Patient taking differently: Take 0.5 tablets (12.5 mg) by mouth once daily.) 90 tablet 1    insulin glargine (Basaglar KwikPen U-100 Insulin) 100 unit/mL (3 mL) pen Inject 15 Units under the skin once daily at bedtime. 15 mL 2    isosorbide mononitrate ER (Imdur) 30 mg 24 hr tablet Take 1 tablet (30 mg) by mouth once daily. 90 tablet 3    lidocaine 4 % patch Place 1 patch over 12 hours on the skin once daily. Remove & discard patch within 12 hours or as directed by MD. 10 patch 1    lisinopril 30 mg tablet Take 1 tablet (30 mg) by mouth once daily. 90 tablet 1    nitroglycerin (Nitrostat) 0.4 mg SL tablet Place 1 tablet (0.4 mg) under the tongue every 5 minutes if needed for chest pain. 25 tablet 5    pantoprazole (ProtoNix) 40 mg EC tablet Take 1 tablet (40 mg) by mouth once daily. 90 tablet 3    simvastatin (Zocor) 20 mg tablet Take 1 tablet (20 mg) by mouth once daily at bedtime. 90 tablet 3    tamsulosin (Flomax) 0.4 mg 24 hr capsule Take 1 capsule (0.4 mg) by mouth once daily.       No current facility-administered medications for this visit.       Immunization History   Administered Date(s) Administered    Flu vaccine, quadrivalent, high-dose, preservative free, age 65y+ (FLUZONE) 10/28/2020, 11/10/2023    Hep A / Hep B 05/08/2014, 06/16/2014, 11/12/2014    Influenza, High Dose Seasonal, Preservative Free 11/16/2017, 09/20/2018, 11/29/2019    Influenza, Unspecified 10/01/2011, 10/03/2012, 10/01/2013, 10/01/2014, 10/01/2015, 10/14/2016    Pfizer Purple Cap SARS-CoV-2 02/27/2021, 03/20/2021, 01/22/2022    Pneumococcal conjugate vaccine, 13-valent (PREVNAR 13) 11/30/2016    Pneumococcal polysaccharide vaccine, 23-valent, age 2 years and older (PNEUMOVAX 23) 06/19/2011        Review of Systems   Constitutional: Negative.    HENT:  Negative.     Eyes: Negative.    Respiratory:  Positive for cough and choking.    Cardiovascular: Negative.    Gastrointestinal: Negative.    Endocrine: Negative.    Genitourinary: Negative.    Musculoskeletal: Negative.    Skin: Negative.    Allergic/Immunologic: Negative.    Hematological: Negative.    Psychiatric/Behavioral: Negative.     All other systems reviewed and are negative.       Vitals:    05/06/24 1108   BP: 131/68   Pulse: 80   Temp: 36.5 °C (97.7 °F)     Vitals:    05/06/24 1108   Weight: 77.1 kg (170 lb)      Physical Exam  Constitutional:       General: He is not in acute distress.     Appearance: Normal appearance.   Cardiovascular:      Rate and Rhythm: Normal rate and regular rhythm.      Pulses: Normal pulses.      Heart sounds: Murmur (Heart regular S1-S2 with a 2/6 systolic ejection murmur at the left sternal border) heard.      No friction rub. No gallop.   Pulmonary:      Effort: Pulmonary effort is normal. No respiratory distress.      Breath sounds: Normal breath sounds. No wheezing or rales.   Neurological:      Mental Status: He is alert.          ASSESSMENT/PLAN: Diabetes mellitus type 2 with worsening control A1c 9.2.  The patient his wife and I discussed the importance of trying to follow a diabetic diet and trying to stay as active as possible.  Increase Basaglar to 20 units daily.  Continue other meds as noted.  Eye examinations are up-to-date.  Next CMP and A1c in 3 months    Chronic cough.  Obtain chest x-ray PA and lateral.  Consider lisinopril as etiology.  The cough currently is not severe and this will be monitored.    Hypertension stable.  Continue current meds noted    Note CT from ER in November 2023 shows evidence of a right renal mass.  Patient will discuss this with his urologist at his next office visit.    CT also with evidence of thyroid nodule and bilateral lung nodules.  These will need to be evaluated with follow-up CT of chest and thyroid ultrasound which will be  ordered.    Follow-up in 3 months and call as needed     Scribe Attestation  By signing my name below, I, Orly Alvarenga LPN, Scribe   attest that this documentation has been prepared under the direction and in the presence of Jimmy Helm MD.

## 2024-05-06 NOTE — PATIENT INSTRUCTIONS
Increase insulin to 20 units nightly  Obtain labs 3 months  Chest xray ordered  Follow up with urology  Return 3 months

## 2024-05-07 ENCOUNTER — TREATMENT (OUTPATIENT)
Dept: PHYSICAL THERAPY | Facility: CLINIC | Age: 79
End: 2024-05-07
Payer: MEDICARE

## 2024-05-07 ENCOUNTER — HOSPITAL ENCOUNTER (OUTPATIENT)
Dept: RADIOLOGY | Facility: CLINIC | Age: 79
Discharge: HOME | End: 2024-05-07
Payer: MEDICARE

## 2024-05-07 DIAGNOSIS — R05.3 CHRONIC COUGH: ICD-10-CM

## 2024-05-07 PROCEDURE — 71046 X-RAY EXAM CHEST 2 VIEWS: CPT | Performed by: STUDENT IN AN ORGANIZED HEALTH CARE EDUCATION/TRAINING PROGRAM

## 2024-05-07 PROCEDURE — 71046 X-RAY EXAM CHEST 2 VIEWS: CPT

## 2024-05-07 PROCEDURE — 97110 THERAPEUTIC EXERCISES: CPT | Mod: KX,GP,CQ

## 2024-05-07 ASSESSMENT — ENCOUNTER SYMPTOMS
HEMATOLOGIC/LYMPHATIC NEGATIVE: 1
PSYCHIATRIC NEGATIVE: 1
CONSTITUTIONAL NEGATIVE: 1
ALLERGIC/IMMUNOLOGIC NEGATIVE: 1
GASTROINTESTINAL NEGATIVE: 1
EYES NEGATIVE: 1
CARDIOVASCULAR NEGATIVE: 1
MUSCULOSKELETAL NEGATIVE: 1
COUGH: 1
CHOKING: 1
ENDOCRINE NEGATIVE: 1

## 2024-05-07 ASSESSMENT — PAIN SCALES - GENERAL: PAINLEVEL_OUTOF10: 2

## 2024-05-07 ASSESSMENT — PAIN - FUNCTIONAL ASSESSMENT: PAIN_FUNCTIONAL_ASSESSMENT: 0-10

## 2024-05-07 NOTE — PROGRESS NOTES
"Physical Therapy Treatment    Patient Name: Chas Melgar  MRN: [id number]  Today's Date: 5/7/2024  Time Calculation  Start Time: 9:51 AM  Stop Time: 10:30 AM  Time Calculation (min):  39 min       PT Therapeutic Procedures Time Entry    Therapeutic Exercise Time Entry: 39 min     INSURANCE:  Visit number: 10/10, 2/12  MDCR    CURRENT PROBLEM:  1. Trochanteric bursitis of left hip          SUBJECTIVE:   General -   Reports hip pain today. Reports less overall fatigue.    Pain -    Pain Location: Lumbar and L hip  Pain Best: 0/10  Pain Today: 2/10   Pain Worst: 4/10     Precautions -    Fall Risk     OBJECTIVE:     Shuffling gait pattern with walker     Treatment -   Therapeutic Exercise (75749) -  Standing hip flexor stretch: 2 x 30 sec ea  Standing hip AB: 2x10 ea   Prone: --  Isometric hooklying clamshell: BTB 5\" 2x15  Bridges on heels with ball squeeze 2 x 15  Seated piriformis figure 4 stretch 30\"x2  Sit to stand at edge of mat 2 x 10  TA isometric push with lower abdominal march 2 x 10  LAQ w/add brace 2# 2 x10  Standing March 2 X 10  Elevated HR/TR 2 x 10  Stand hip flexion/extensions: 2 x 10  Partial Squats to mat 2 x 10  Step ups F 4\" x 10  Modified nadine stretch --    ASSESSMENT:     Able to tolerate treatment this date with minimal fatigue. Continues to require cues to perform therex properly. Unsteady during standing activities.  No increase in pain following treatment.    PLAN:   Continue with gait and balance activities to decrease risk for falls.    "

## 2024-05-08 DIAGNOSIS — Z79.84 DIABETES MELLITUS TREATED WITH INSULIN AND ORAL MEDICATION (MULTI): ICD-10-CM

## 2024-05-08 DIAGNOSIS — Z79.4 DIABETES MELLITUS TREATED WITH INSULIN AND ORAL MEDICATION (MULTI): ICD-10-CM

## 2024-05-08 DIAGNOSIS — E11.9 DIABETES MELLITUS TREATED WITH INSULIN AND ORAL MEDICATION (MULTI): ICD-10-CM

## 2024-05-08 RX ORDER — INSULIN GLARGINE 100 [IU]/ML
20 INJECTION, SOLUTION SUBCUTANEOUS NIGHTLY
Qty: 9 ML | Refills: 1 | Status: SHIPPED | OUTPATIENT
Start: 2024-05-08 | End: 2024-05-09

## 2024-05-08 NOTE — TELEPHONE ENCOUNTER
OK for orders/labs/meds as requested. SNO insulin glargine solostar.  Basaglar not covered by insurance.  Sent to provider for authorization.

## 2024-05-09 ENCOUNTER — TELEPHONE (OUTPATIENT)
Dept: PRIMARY CARE | Facility: CLINIC | Age: 79
End: 2024-05-09
Payer: MEDICARE

## 2024-05-09 DIAGNOSIS — Z79.84 DIABETES MELLITUS TREATED WITH INSULIN AND ORAL MEDICATION (MULTI): ICD-10-CM

## 2024-05-09 DIAGNOSIS — E11.9 DIABETES MELLITUS TREATED WITH INSULIN AND ORAL MEDICATION (MULTI): ICD-10-CM

## 2024-05-09 DIAGNOSIS — Z79.4 DIABETES MELLITUS TREATED WITH INSULIN AND ORAL MEDICATION (MULTI): ICD-10-CM

## 2024-05-09 RX ORDER — INSULIN GLARGINE 100 [IU]/ML
20 INJECTION, SOLUTION SUBCUTANEOUS NIGHTLY
Qty: 15 ML | Refills: 1 | Status: SHIPPED | OUTPATIENT
Start: 2024-05-09

## 2024-05-09 NOTE — TELEPHONE ENCOUNTER
Received a PA for Basaglar.  Called insurance and they stated that it is being rejected because it is too soon to fill.  Insurance will not cover it until June 7th.   Sent the patient a [place] message with the information.

## 2024-05-13 ENCOUNTER — HOSPITAL ENCOUNTER (OUTPATIENT)
Dept: RADIOLOGY | Facility: CLINIC | Age: 79
Discharge: HOME | End: 2024-05-13
Payer: MEDICARE

## 2024-05-13 DIAGNOSIS — E04.1 THYROID NODULE: ICD-10-CM

## 2024-05-13 PROCEDURE — 76536 US EXAM OF HEAD AND NECK: CPT | Performed by: RADIOLOGY

## 2024-05-13 PROCEDURE — 76536 US EXAM OF HEAD AND NECK: CPT

## 2024-05-14 ENCOUNTER — TREATMENT (OUTPATIENT)
Dept: PHYSICAL THERAPY | Facility: CLINIC | Age: 79
End: 2024-05-14
Payer: MEDICARE

## 2024-05-14 PROCEDURE — 97110 THERAPEUTIC EXERCISES: CPT | Mod: GP,KX,CQ

## 2024-05-14 ASSESSMENT — PAIN SCALES - GENERAL: PAINLEVEL_OUTOF10: 2

## 2024-05-14 ASSESSMENT — PAIN - FUNCTIONAL ASSESSMENT: PAIN_FUNCTIONAL_ASSESSMENT: 0-10

## 2024-05-14 NOTE — PROGRESS NOTES
"Physical Therapy Treatment    Patient Name: Chas Melgar  MRN: [id number]  Today's Date: 5/14/2024  Time Calculation  Start Time: 9:18 AM  Stop Time: 9:56 AM  Time Calculation (min):  38 min    PT Therapeutic Procedures Time Entry    Therapeutic Exercise Time Entry: 38 min     INSURANCE:  Visit number: 10/10, 3/12  MDCR    CURRENT PROBLEM:  1. Trochanteric bursitis of left hip          SUBJECTIVE:   General -   Reports increased hip pain in the am which eases as the day goes on. Reports his legs are fatigued at the end of the day.    Pain -    Pain Location: Lumbar and L hip  Pain Best: 0/10  Pain Today: 2/10   Pain Worst: 4/10     Precautions -    Fall Risk     OBJECTIVE:     Sit to stand from mat without use of UE     Treatment -   Therapeutic Exercise (20161) -  Standing hip flexor stretch: 2 x 30 sec ea  Standing hip AB: 2x10 ea   Prone: --  Isometric hooklying clamshell: BTB 5\" 2x15  Bridges on heels with ball squeeze 2 x 15  Seated piriformis figure 4 stretch 30\"x2  Sit to stand at edge of mat 2 x 10  TA isometric push with lower abdominal march 2 x 10  LAQ w/add brace 2# 2 x10  Standing March 2 X 10  Elevated HR/TR 2 x 10  Stand hip flexion/extensions: 2 x 10  Partial Squats to mat 2 x 10  Step ups F 4\" x 5 ea  Modified nadine stretch --  Tandem stance 2 x 30 sec  Eyes closed/Narrow HENNY 2 x 30 sec    ASSESSMENT:     Requires frequent rest breaks to complete treatment. Unsteady this date, required assistance to improve balance and avoid falls. Cues to increase step height and avoid shuffling gait pattern. Fatigued following treatment. Encouraged patient to slow pace of ambulation to allow for increased step height and improve safety.    PLAN:   Continue with gait and balance activities to decrease risk for falls.  "

## 2024-05-19 NOTE — PROGRESS NOTES
"Patient ID: Chas Melgar is a 79 y.o. male.    Subjective    HPI     Chas Melgar is a 78 y/o male with PMH HTN, HLD, former smoker, steatohepatitis, homozygous hereditary hemochromatosis for H63D mutation in 2010 and MDS. Here for next dose of luspatercept. He reports today he is feeling better compared to 3 weeks ago when he felt lightheaded, more weak than usual and tired. He reports his fatigue fluctuates. His weight is stable and bowel movements are regular. He denies new or worsening complaints. He denies signs of bleeding, recent or recurrent infections, fevers, early satiety or changes in his baseline SOB.    Since last visit patient was in the hospital on 11/21/23 for a fall. CT c/a/p showed \"right renal mass measuring 1.4 cm\" and \"1.1 cm hypodense nodule in the right lobe\" of the thyroid gland and several pulmonary nodules. Per his primary care provider's note patient needs Thyroid US and repeat CT are needed. Thyroid US was completed and he will review results with ordering provider at next visit. In addition, patient was instructed to notify his urologist about the renal mass, he has an upcoming visit with him and we will print the scan report to take with him in addition to today's lab since he is an outside facility.       Objective    Visit Vitals  /62   Pulse 91   Temp 36.4 °C (97.5 °F)   Resp 17   Wt 77.3 kg (170 lb 6.7 oz)   SpO2 95%   BMI 27.09 kg/m²   Smoking Status Former   BSA 1.9 m²     Review of Systems   Constitutional:  Positive for fatigue. Negative for chills, diaphoresis, fever and unexpected weight change.   HENT:  Negative.  Negative for lump/mass and trouble swallowing.         Says has ongoing mild drooling from right side of mouth when not eating   Eyes: Negative.    Respiratory:  Positive for shortness of breath (Patient says intermittent and not new, hx smoking). Negative for chest tightness and cough.    Cardiovascular: Negative.    Gastrointestinal: Negative. "  Negative for abdominal pain and nausea.        Denies early satiety   Genitourinary: Negative.     Musculoskeletal: Negative.    Skin: Negative.    Neurological: Negative.  Negative for numbness.   Hematological: Negative.  Negative for adenopathy.   Psychiatric/Behavioral: Negative.     All other systems reviewed and are negative.       Physical Exam  Vitals reviewed.   Constitutional:       Appearance: Normal appearance.   HENT:      Head: Normocephalic.   Eyes:      Extraocular Movements: Extraocular movements intact.      Pupils: Pupils are equal, round, and reactive to light.   Cardiovascular:      Rate and Rhythm: Normal rate and regular rhythm.      Heart sounds: Normal heart sounds.   Abdominal:      General: Bowel sounds are normal.      Palpations: Abdomen is soft.   Musculoskeletal:         General: Normal range of motion.      Cervical back: Normal range of motion and neck supple.      Comments: Uses cane   Skin:     General: Skin is warm.   Neurological:      General: No focal deficit present.      Mental Status: He is alert and oriented to person, place, and time.   Psychiatric:         Mood and Affect: Mood normal.       Performance Status:  Symptomatic; fully ambulatory    Labs:  Lab Results   Component Value Date    WBC 5.9 05/20/2024    NEUTROABS 3.31 05/20/2024    IGABSOL 0.01 05/20/2024    LYMPHSABS 1.48 05/20/2024    MONOSABS 0.57 05/20/2024    EOSABS 0.48 (H) 05/20/2024    BASOSABS 0.07 05/20/2024    RBC 2.72 (L) 05/20/2024     (H) 05/20/2024    MCHC 33.9 05/20/2024    HGB 10.4 (L) 05/20/2024    HCT 30.7 (L) 05/20/2024     (L) 05/20/2024     Lab Results   Component Value Date    CREATININE 1.87 (H) 05/20/2024    BUN 37 (H) 05/20/2024    EGFR 36 (L) 05/20/2024     05/20/2024    K 4.6 05/20/2024     05/20/2024    CO2 25 05/20/2024      Lab Results   Component Value Date    ALT 14 05/20/2024    AST 11 05/20/2024    ALKPHOS 66 05/20/2024    BILITOT 1.3 (H) 05/20/2024             Assessment/Plan    1) MDS/RARS  -has symptomatic anemia  -failed aranesp  -currently on luspatercept Q3 weeks  -currently being dose at 1.33 mg/kg  -labs to be done today include CBC, COMP  -results reviewed--wbc 5.9, hgb 10.4, plt 148,000, creatinine 1.87  -benefits, risks, potential morbidity related to luspatercept were reviewed with Chas and he provided informed consent to prceed  -he went on to receive luspatercept 1.33 mg/kg subcutaneous  - He will be discussing renal mass with urology  - His PCP is following his thyroid and lung nodules   -he will return in 3 weeks for next dose    2) hereditary hemochromatosis  -had been on therapeutic phlebotomy in the past, however, given current marked anemia, it has not been wise to continue with phlebotomy    3) hyperlipidemia  -on simvastatin    4) hypertension  -on hydrochlorothiazide  -on lisinopril  -on Cartia XT    5) diabetes  -on glimepiride  -on metformin  -on levemir    6) PAD  -on ASA  -s/p multiple stents      Diagnoses and all orders for this visit:  MDS (myelodysplastic syndrome), low grade (Multi)  -     Clinic Appointment Request Chemo Follow Up; [facility]  -     Clinic Appointment Request [facility]; Future  -     Infusion Appointment Request; Future  Refractory anemia with ring sideroblasts (Multi)  -     Clinic Appointment Request Chemo Follow Up; [facility] WHITE  -     Clinic Appointment Request [facility]; Future  -     Infusion Appointment Request; Future             Reji White, JESSICA-CNP

## 2024-05-20 ENCOUNTER — INFUSION (OUTPATIENT)
Dept: HEMATOLOGY/ONCOLOGY | Facility: CLINIC | Age: 79
End: 2024-05-20
Payer: MEDICARE

## 2024-05-20 ENCOUNTER — OFFICE VISIT (OUTPATIENT)
Dept: HEMATOLOGY/ONCOLOGY | Facility: CLINIC | Age: 79
End: 2024-05-20
Payer: MEDICARE

## 2024-05-20 ENCOUNTER — LAB (OUTPATIENT)
Dept: LAB | Facility: CLINIC | Age: 79
End: 2024-05-20
Payer: MEDICARE

## 2024-05-20 VITALS
WEIGHT: 170.42 LBS | DIASTOLIC BLOOD PRESSURE: 62 MMHG | HEART RATE: 91 BPM | BODY MASS INDEX: 27.09 KG/M2 | SYSTOLIC BLOOD PRESSURE: 130 MMHG | RESPIRATION RATE: 17 BRPM | OXYGEN SATURATION: 95 % | TEMPERATURE: 97.5 F

## 2024-05-20 DIAGNOSIS — Z79.4 DIABETES MELLITUS DUE TO UNDERLYING CONDITION WITH HYPERGLYCEMIA, WITH LONG-TERM CURRENT USE OF INSULIN (MULTI): ICD-10-CM

## 2024-05-20 DIAGNOSIS — E66.3 OVERWEIGHT WITH BODY MASS INDEX (BMI) OF 27 TO 27.9 IN ADULT: ICD-10-CM

## 2024-05-20 DIAGNOSIS — D46.1 REFRACTORY ANEMIA WITH RING SIDEROBLASTS (MULTI): ICD-10-CM

## 2024-05-20 DIAGNOSIS — D46.Z MDS (MYELODYSPLASTIC SYNDROME), LOW GRADE (MULTI): ICD-10-CM

## 2024-05-20 DIAGNOSIS — E08.65 DIABETES MELLITUS DUE TO UNDERLYING CONDITION WITH HYPERGLYCEMIA, WITH LONG-TERM CURRENT USE OF INSULIN (MULTI): ICD-10-CM

## 2024-05-20 DIAGNOSIS — E78.2 MIXED HYPERLIPIDEMIA: ICD-10-CM

## 2024-05-20 DIAGNOSIS — I10 ESSENTIAL HYPERTENSION: ICD-10-CM

## 2024-05-20 LAB
ALBUMIN SERPL BCP-MCNC: 4.3 G/DL (ref 3.4–5)
ALP SERPL-CCNC: 66 U/L (ref 33–136)
ALT SERPL W P-5'-P-CCNC: 14 U/L (ref 10–52)
ANION GAP SERPL CALC-SCNC: 15 MMOL/L (ref 10–20)
AST SERPL W P-5'-P-CCNC: 11 U/L (ref 9–39)
BASOPHILS # BLD AUTO: 0.07 X10*3/UL (ref 0–0.1)
BASOPHILS NFR BLD AUTO: 1.2 %
BILIRUB SERPL-MCNC: 1.3 MG/DL (ref 0–1.2)
BUN SERPL-MCNC: 37 MG/DL (ref 6–23)
CALCIUM SERPL-MCNC: 9.1 MG/DL (ref 8.6–10.3)
CHLORIDE SERPL-SCNC: 102 MMOL/L (ref 98–107)
CO2 SERPL-SCNC: 25 MMOL/L (ref 21–32)
CREAT SERPL-MCNC: 1.87 MG/DL (ref 0.5–1.3)
EGFRCR SERPLBLD CKD-EPI 2021: 36 ML/MIN/1.73M*2
EOSINOPHIL # BLD AUTO: 0.48 X10*3/UL (ref 0–0.4)
EOSINOPHIL NFR BLD AUTO: 8.1 %
ERYTHROCYTE [DISTWIDTH] IN BLOOD BY AUTOMATED COUNT: 20 % (ref 11.5–14.5)
EST. AVERAGE GLUCOSE BLD GHB EST-MCNC: 206 MG/DL
GIANT PLATELETS BLD QL SMEAR: NORMAL
GLUCOSE SERPL-MCNC: 341 MG/DL (ref 74–99)
HBA1C MFR BLD: 8.8 %
HCT VFR BLD AUTO: 30.7 % (ref 41–52)
HGB BLD-MCNC: 10.4 G/DL (ref 13.5–17.5)
HYPOCHROMIA BLD QL SMEAR: NORMAL
IMM GRANULOCYTES # BLD AUTO: 0.01 X10*3/UL (ref 0–0.5)
IMM GRANULOCYTES NFR BLD AUTO: 0.2 % (ref 0–0.9)
LYMPHOCYTES # BLD AUTO: 1.48 X10*3/UL (ref 0.8–3)
LYMPHOCYTES NFR BLD AUTO: 25 %
MCH RBC QN AUTO: 38.2 PG (ref 26–34)
MCHC RBC AUTO-ENTMCNC: 33.9 G/DL (ref 32–36)
MCV RBC AUTO: 113 FL (ref 80–100)
MONOCYTES # BLD AUTO: 0.57 X10*3/UL (ref 0.05–0.8)
MONOCYTES NFR BLD AUTO: 9.6 %
NEUTROPHILS # BLD AUTO: 3.31 X10*3/UL (ref 1.6–5.5)
NEUTROPHILS NFR BLD AUTO: 55.9 %
NRBC BLD-RTO: ABNORMAL /100{WBCS}
OVALOCYTES BLD QL SMEAR: NORMAL
PATH REVIEW-CBC DIFFERENTIAL: NORMAL
PLATELET # BLD AUTO: 148 X10*3/UL (ref 150–450)
POLYCHROMASIA BLD QL SMEAR: NORMAL
POTASSIUM SERPL-SCNC: 4.6 MMOL/L (ref 3.5–5.3)
PROT SERPL-MCNC: 6.4 G/DL (ref 6.4–8.2)
RBC # BLD AUTO: 2.72 X10*6/UL (ref 4.5–5.9)
RBC MORPH BLD: NORMAL
SODIUM SERPL-SCNC: 137 MMOL/L (ref 136–145)
WBC # BLD AUTO: 5.9 X10*3/UL (ref 4.4–11.3)

## 2024-05-20 PROCEDURE — 1125F AMNT PAIN NOTED PAIN PRSNT: CPT

## 2024-05-20 PROCEDURE — 1036F TOBACCO NON-USER: CPT

## 2024-05-20 PROCEDURE — 85060 BLOOD SMEAR INTERPRETATION: CPT | Performed by: INTERNAL MEDICINE

## 2024-05-20 PROCEDURE — 85025 COMPLETE CBC W/AUTO DIFF WBC: CPT

## 2024-05-20 PROCEDURE — 3075F SYST BP GE 130 - 139MM HG: CPT

## 2024-05-20 PROCEDURE — 80053 COMPREHEN METABOLIC PANEL: CPT

## 2024-05-20 PROCEDURE — 99214 OFFICE O/P EST MOD 30 MIN: CPT

## 2024-05-20 PROCEDURE — 1160F RVW MEDS BY RX/DR IN RCRD: CPT

## 2024-05-20 PROCEDURE — 3078F DIAST BP <80 MM HG: CPT

## 2024-05-20 PROCEDURE — 36415 COLL VENOUS BLD VENIPUNCTURE: CPT

## 2024-05-20 PROCEDURE — 2500000004 HC RX 250 GENERAL PHARMACY W/ HCPCS (ALT 636 FOR OP/ED): Performed by: INTERNAL MEDICINE

## 2024-05-20 PROCEDURE — 1159F MED LIST DOCD IN RCRD: CPT

## 2024-05-20 PROCEDURE — 96372 THER/PROPH/DIAG INJ SC/IM: CPT

## 2024-05-20 PROCEDURE — 83036 HEMOGLOBIN GLYCOSYLATED A1C: CPT | Performed by: FAMILY MEDICINE

## 2024-05-20 RX ORDER — ALBUTEROL SULFATE 0.83 MG/ML
3 SOLUTION RESPIRATORY (INHALATION) AS NEEDED
Status: CANCELLED | OUTPATIENT
Start: 2024-06-10

## 2024-05-20 RX ORDER — FAMOTIDINE 10 MG/ML
20 INJECTION INTRAVENOUS ONCE AS NEEDED
Status: CANCELLED | OUTPATIENT
Start: 2024-06-10

## 2024-05-20 RX ORDER — EPINEPHRINE 0.3 MG/.3ML
0.3 INJECTION SUBCUTANEOUS EVERY 5 MIN PRN
Status: CANCELLED | OUTPATIENT
Start: 2024-06-10

## 2024-05-20 RX ORDER — DIPHENHYDRAMINE HYDROCHLORIDE 50 MG/ML
50 INJECTION INTRAMUSCULAR; INTRAVENOUS AS NEEDED
Status: CANCELLED | OUTPATIENT
Start: 2024-06-10

## 2024-05-20 RX ADMIN — LUSPATERCEPT 102.5 MG: 75 INJECTION, POWDER, LYOPHILIZED, FOR SOLUTION SUBCUTANEOUS at 12:07

## 2024-05-20 ASSESSMENT — ENCOUNTER SYMPTOMS
COUGH: 0
UNEXPECTED WEIGHT CHANGE: 0
GASTROINTESTINAL NEGATIVE: 1
FEVER: 0
PSYCHIATRIC NEGATIVE: 1
DIAPHORESIS: 0
ABDOMINAL PAIN: 0
ROS GI COMMENTS: DENIES EARLY SATIETY
TROUBLE SWALLOWING: 0
NAUSEA: 0
NUMBNESS: 0
EYES NEGATIVE: 1
SHORTNESS OF BREATH: 1
ADENOPATHY: 0
CHEST TIGHTNESS: 0
NEUROLOGICAL NEGATIVE: 1
CARDIOVASCULAR NEGATIVE: 1
HEMATOLOGIC/LYMPHATIC NEGATIVE: 1
MUSCULOSKELETAL NEGATIVE: 1
CHILLS: 0
FATIGUE: 1

## 2024-05-20 ASSESSMENT — PAIN SCALES - GENERAL: PAINLEVEL: 2

## 2024-05-21 ENCOUNTER — TREATMENT (OUTPATIENT)
Dept: PHYSICAL THERAPY | Facility: CLINIC | Age: 79
End: 2024-05-21
Payer: MEDICARE

## 2024-05-21 PROCEDURE — 97110 THERAPEUTIC EXERCISES: CPT | Mod: GP,CQ,KX

## 2024-05-21 NOTE — PROGRESS NOTES
"Physical Therapy Treatment    Patient Name: Chas Melgar  MRN: [id number]  Today's Date: 5/21/2024  Time Calculation  Start Time: 12:58 PM  Stop Time: 1:32 PM  Time Calculation (min):     PT Therapeutic Procedures Time Entry    Therapeutic Exercise Time Entry: 34 min     INSURANCE:  Visit number: 10/10, 4/12  MDCR    CURRENT PROBLEM:  1. Trochanteric bursitis of left hip          SUBJECTIVE:   General -   Reports minimal hip pain at rest. \" I am tired from moving.\"    Pain -    Pain Location: Lumbar and L hip  Pain Best: 0/10  Pain Today: 0/10   Pain Worst: 4/10     Precautions -    Fall Risk     OBJECTIVE:     Gait training with walking stick x 25 feet     Treatment -   Therapeutic Exercise (16754) -  Standing hip flexor stretch: 2 x 30 sec ea  Standing hip AB: 2x10 ea   Prone: --  Isometric hooklying clamshell: BTB 5\" 2x15  Bridges on heels with ball squeeze 2 x 15  Seated piriformis figure 4 stretch 30\"x2  Sit to stand at edge of mat 2 x 10  TA isometric push with lower abdominal march 2 x 10  LAQ w/add brace 2# 2 x10  Standing March 2 X 10 on foam  Elevated HR/TR 2 x 10  Stand hip flexion/extensions: 2 x 10  Partial Squats to mat 2 x 10  Step ups F 4\" x 5 ea  Modified nadine stretch --  Tandem stance 2 x 30 sec  Eyes closed/Narrow HENNY 2 x 30 sec    ASSESSMENT:     Requires cues to perform therex correctly. Unsteady during unsupported standing activities, requires cues to maintain balance. Fatigued following standing therex. Encouraged use of walker vs walking stick to prevent falls. Cues to increase step height during ambulation.      PLAN:   Continue with gait and balance activities to decrease risk for falls.  "

## 2024-05-22 ENCOUNTER — OFFICE VISIT (OUTPATIENT)
Dept: PRIMARY CARE | Facility: CLINIC | Age: 79
End: 2024-05-22
Payer: MEDICARE

## 2024-05-22 VITALS
HEIGHT: 67 IN | HEART RATE: 93 BPM | DIASTOLIC BLOOD PRESSURE: 63 MMHG | SYSTOLIC BLOOD PRESSURE: 101 MMHG | BODY MASS INDEX: 26.53 KG/M2 | TEMPERATURE: 98.1 F | WEIGHT: 169 LBS

## 2024-05-22 DIAGNOSIS — E66.3 OVERWEIGHT WITH BODY MASS INDEX (BMI) OF 26 TO 26.9 IN ADULT: ICD-10-CM

## 2024-05-22 DIAGNOSIS — E04.1 THYROID NODULE: ICD-10-CM

## 2024-05-22 DIAGNOSIS — Z79.4 DIABETES MELLITUS DUE TO UNDERLYING CONDITION WITH HYPERGLYCEMIA, WITH LONG-TERM CURRENT USE OF INSULIN (MULTI): ICD-10-CM

## 2024-05-22 DIAGNOSIS — E08.65 DIABETES MELLITUS DUE TO UNDERLYING CONDITION WITH HYPERGLYCEMIA, WITH LONG-TERM CURRENT USE OF INSULIN (MULTI): ICD-10-CM

## 2024-05-22 DIAGNOSIS — Z87.891 FORMER SMOKER: ICD-10-CM

## 2024-05-22 PROCEDURE — 99213 OFFICE O/P EST LOW 20 MIN: CPT | Performed by: FAMILY MEDICINE

## 2024-05-22 PROCEDURE — 1159F MED LIST DOCD IN RCRD: CPT | Performed by: FAMILY MEDICINE

## 2024-05-22 PROCEDURE — 1036F TOBACCO NON-USER: CPT | Performed by: FAMILY MEDICINE

## 2024-05-22 PROCEDURE — 3074F SYST BP LT 130 MM HG: CPT | Performed by: FAMILY MEDICINE

## 2024-05-22 PROCEDURE — 3078F DIAST BP <80 MM HG: CPT | Performed by: FAMILY MEDICINE

## 2024-05-22 PROCEDURE — 1160F RVW MEDS BY RX/DR IN RCRD: CPT | Performed by: FAMILY MEDICINE

## 2024-05-22 NOTE — PROGRESS NOTES
Pat Hua is here primarily for follow-up on his thyroid ultrasound.  He is accompanied by his wife.  He states that he is overall feeling well and has no new complaints.  He continues on his meds noted.  Thyroid ultrasound did show 2 nodules.  He has no previous history of thyroid disease.  He did see his urologist and the kidney mass noted on previous CT is going to be monitored by him.    Patient ID: Chas Melgar is a 79 y.o. male who presents for Follow-up and Results (Lab review.):    Problem List Items Addressed This Visit    None     Past Medical History:   Diagnosis Date    Acute gastric ulcer with hemorrhage 09/07/2022    Acute gastric ulcer with bleeding    Acute upper respiratory infection, unspecified     URI, acute    Arthritis     Body mass index (BMI) 28.0-28.9, adult 10/19/2021    BMI 28.0-28.9,adult    Cancer (Multi)     Coronary artery disease     Diabetes mellitus (Multi)     Encounter for other preprocedural examination 10/14/2021    Pre-operative clearance    Encounter for screening for malignant neoplasm of prostate     Encounter for prostate cancer screening    Impingement syndrome of unspecified shoulder 07/22/2021    Impingement syndrome of shoulder region, unspecified laterality    Other abnormalities of breathing     Difficulty breathing    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)     Other specified disorders of pancreatic internal secretion    Other specified follicular disorders     Actinic folliculitis    Other specified postprocedural states 06/09/2021    Status post arthroscopy of left shoulder    Overweight 02/21/2022    Overweight with body mass index (BMI) of 29 to 29.9 in adult    Overweight 01/18/2022    Overweight with body mass index (BMI) of 28 to 28.9 in adult    Pain in right hip     Hip pain, right    Pain in right leg     Pain of right lower extremity    Pain in unspecified hip 08/04/2021    Hip joint pain    Pain in unspecified  shoulder 08/10/2021    Shoulder pain    Personal history of malignant neoplasm, unspecified 08/04/2021    History of malignant neoplasm    Personal history of other diseases of male genital organs     History of benign prostatic hyperplasia    Personal history of other diseases of the circulatory system 08/04/2021    History of hypertension    Personal history of other diseases of the circulatory system 08/04/2021    History of cardiac disorder    Personal history of other diseases of the digestive system 10/14/2021    History of gastrointestinal hemorrhage    Personal history of other diseases of the digestive system 02/26/2022    History of right inguinal hernia    Personal history of other diseases of the digestive system     History of fatty infiltration of liver    Personal history of other diseases of the musculoskeletal system and connective tissue 08/04/2021    History of arthritis    Personal history of other diseases of the musculoskeletal system and connective tissue 08/05/2021    History of rotator cuff tear    Personal history of other diseases of the respiratory system 11/17/2021    History of upper respiratory infection    Personal history of other diseases of the respiratory system     History of acute bronchitis    Personal history of other diseases of the respiratory system     History of acute pharyngitis    Personal history of other diseases of the respiratory system     History of acute sinusitis    Personal history of other diseases of urinary system 09/01/2022    History of renal insufficiency syndrome    Personal history of other endocrine, nutritional and metabolic disease 10/06/2021    History of diabetes mellitus    Personal history of other endocrine, nutritional and metabolic disease 01/18/2022    History of hyperkalemia    Personal history of other malignant neoplasm of skin     History of other malignant neoplasm of skin    Personal history of other specified conditions 09/01/2022     History of chest pain    Personal history of other specified conditions     History of dysuria    Personal history of other specified conditions     History of chest pain    Persons encountering health services in other specified circumstances     Encounter for support and coordination of transition of care    Primary osteoarthritis, 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    DJD of left shoulder    Right lower quadrant pain 02/26/2022    Right inguinal pain    Strain of muscle, fascia and tendon of lower back, initial encounter     Lumbar strain    Strain of muscle, fascia and tendon of other parts of biceps, right arm, initial encounter 10/14/2021    Rupture of right biceps tendon, initial encounter    Trochanteric bursitis, left hip 08/10/2021    Trochanteric bursitis of left hip      Past Surgical History:   Procedure Laterality Date    CT AORTA AND BILATERAL ILIOFEMORAL RUNOFF ANGIOGRAM W AND/OR WO IV CONTRAST  10/27/2020    CT AORTA AND BILATERAL ILIOFEMORAL RUNOFF ANGIOGRAM W AND/OR WO IV CONTRAST 10/27/2020 [facility]    OTHER SURGICAL HISTORY  11/07/2022    Colonoscopy    OTHER SURGICAL HISTORY  10/14/2021    Cardiac catheterization with stent placement    OTHER SURGICAL HISTORY  10/14/2021    PTA femoral artery    OTHER SURGICAL HISTORY  10/14/2021    Rotator cuff repair    OTHER SURGICAL HISTORY  10/14/2021    Inguinal hernia repair    OTHER SURGICAL HISTORY  10/14/2021    Knee arthroscopy    OTHER SURGICAL HISTORY  11/17/2021    Skin biopsy    ROTATOR CUFF REPAIR        Family History   Problem Relation Name Age of Onset    Diabetes Mother Melinda andino     Coronary artery disease Father      Hypertension Other      Alzheimer's disease Other        Social History     Socioeconomic History    Marital status:      Spouse name: Not on file    Number of children: Not on file    Years of education: Not on file    Highest education level: Not on file   Occupational History    Not on file   Tobacco Use     "Smoking status: Former     Current packs/day: 0.00     Average packs/day: 2.0 packs/day for 20.0 years (40.0 ttl pk-yrs)     Types: Cigarettes     Start date: 1966     Quit date: 1986     Years since quittin.4    Smokeless tobacco: Never   Substance and Sexual Activity    Alcohol use: Yes     Alcohol/week: 2.0 standard drinks of alcohol     Types: 2 Standard drinks or equivalent per week    Drug use: Never    Sexual activity: Not Currently     Partners: Female   Other Topics Concern    Not on file   Social History Narrative    Not on file     Social Determinants of Health     Financial Resource Strain: Not on file   Food Insecurity: Not on file   Transportation Needs: Not on file   Physical Activity: Not on file   Stress: Not on file   Social Connections: Not on file   Intimate Partner Violence: Not on file   Housing Stability: Not on file      Aluminum and Nickel   Current Outpatient Medications   Medication Sig Dispense Refill    aspirin 81 mg EC tablet Take 1 tablet (81 mg) by mouth once daily.      BD Ultra-Fine Short Pen Needle 31 gauge x 5/16\" needle USE ONCE DAILY WITH LEVEMIR 100 each 1    calcitriol (Rocaltrol) 0.25 mcg capsule Take 1 capsule (0.25 mcg) by mouth every other day. 45 capsule 3    cyanocobalamin (Vitamin B-12) 1,000 mcg tablet Take 2 tablets (2,000 mcg) by mouth once daily.      dapagliflozin propanediol (Farxiga) 10 mg Take 1 tablet (10 mg) by mouth once daily in the morning. Take before meals. 90 tablet 1    dilTIAZem CD (Cardizem CD) 240 mg 24 hr capsule TAKE 1 CAPSULE BY MOUTH DAILY 90 capsule 3    folic acid 0.8 mg capsule Take 1 tablet by mouth once daily at bedtime.      glimepiride (Amaryl) 4 mg tablet Take 1 tablet (4 mg) by mouth 2 times a day. 180 tablet 1    hydroCHLOROthiazide (HYDRODiuril) 25 mg tablet Take 1 tablet (25 mg) by mouth once daily. (Patient taking differently: Take 0.5 tablets (12.5 mg) by mouth once daily.) 90 tablet 1    insulin glargine (Basaglar " KwikPen U-100 Insulin) 100 unit/mL (3 mL) pen Inject 20 Units under the skin once daily at bedtime. 15 mL 1    isosorbide mononitrate ER (Imdur) 30 mg 24 hr tablet Take 1 tablet (30 mg) by mouth once daily. 90 tablet 3    lidocaine 4 % patch Place 1 patch over 12 hours on the skin once daily. Remove & discard patch within 12 hours or as directed by MD. 10 patch 1    lisinopril 30 mg tablet Take 1 tablet (30 mg) by mouth once daily. 90 tablet 1    nitroglycerin (Nitrostat) 0.4 mg SL tablet Place 1 tablet (0.4 mg) under the tongue every 5 minutes if needed for chest pain. 25 tablet 5    pantoprazole (ProtoNix) 40 mg EC tablet Take 1 tablet (40 mg) by mouth once daily. 90 tablet 3    simvastatin (Zocor) 20 mg tablet Take 1 tablet (20 mg) by mouth once daily at bedtime. 90 tablet 3    tamsulosin (Flomax) 0.4 mg 24 hr capsule Take 1 capsule (0.4 mg) by mouth once daily.       No current facility-administered medications for this visit.       Immunization History   Administered Date(s) Administered    Flu vaccine, quadrivalent, high-dose, preservative free, age 65y+ (FLUZONE) 10/28/2020, 11/10/2023    Hep A / Hep B 05/08/2014, 06/16/2014, 11/12/2014    Influenza, High Dose Seasonal, Preservative Free 11/16/2017, 09/20/2018, 11/29/2019    Influenza, Unspecified 10/01/2011, 10/03/2012, 10/01/2013, 10/01/2014, 10/01/2015, 10/14/2016    Pfizer Purple Cap SARS-CoV-2 02/27/2021, 03/20/2021, 01/22/2022    Pneumococcal conjugate vaccine, 13-valent (PREVNAR 13) 11/30/2016    Pneumococcal polysaccharide vaccine, 23-valent, age 2 years and older (PNEUMOVAX 23) 06/19/2011        Review of Systems   Constitutional: Negative.    HENT: Negative.     Eyes: Negative.    Respiratory: Negative.     Cardiovascular: Negative.    Gastrointestinal: Negative.    Endocrine: Negative.    Genitourinary: Negative.    Musculoskeletal: Negative.    Skin: Negative.    Allergic/Immunologic: Negative.    Hematological: Negative.     Psychiatric/Behavioral: Negative.     All other systems reviewed and are negative.       Vitals:    05/22/24 1020   BP: 101/63   Pulse: 93   Temp: 36.7 °C (98.1 °F)     Vitals:    05/22/24 1020   Weight: 76.7 kg (169 lb)      Physical Exam  Constitutional:       General: He is not in acute distress.     Appearance: Normal appearance.   Cardiovascular:      Rate and Rhythm: Normal rate and regular rhythm.      Pulses: Normal pulses.      Heart sounds: Murmur (Regular S1-S2 with a 1/6 to 2/6 systolic ejection murmur at the left sternal border) heard.      No friction rub. No gallop.   Pulmonary:      Effort: Pulmonary effort is normal. No respiratory distress.      Breath sounds: Normal breath sounds. No wheezing or rales.   Neurological:      Mental Status: He is alert.     Neck-there is no thyromegaly or nodules palpable to palpation    ASSESSMENT/PLAN: Thyroid nodules with cervical adenopathy per ultrasound.  Patient is referred to Dr. Kirkpatrick for evaluation and recommendations.    Right renal mass evaluated by urology.  Follow-up with urology as recommended    Obtain CMP A1c in 3 months  Follow-up in 3 months and call as needed       Scribe Attestation  By signing my name below, I, Orly Alvarenga LPN, Scribe   attest that this documentation has been prepared under the direction and in the presence of Jimmy Helm MD.

## 2024-05-23 ASSESSMENT — ENCOUNTER SYMPTOMS
RESPIRATORY NEGATIVE: 1
CONSTITUTIONAL NEGATIVE: 1
ALLERGIC/IMMUNOLOGIC NEGATIVE: 1
CARDIOVASCULAR NEGATIVE: 1
EYES NEGATIVE: 1
ENDOCRINE NEGATIVE: 1
HEMATOLOGIC/LYMPHATIC NEGATIVE: 1
MUSCULOSKELETAL NEGATIVE: 1
PSYCHIATRIC NEGATIVE: 1
GASTROINTESTINAL NEGATIVE: 1

## 2024-05-28 ENCOUNTER — APPOINTMENT (OUTPATIENT)
Dept: PHYSICAL THERAPY | Facility: CLINIC | Age: 79
End: 2024-05-28
Payer: MEDICARE

## 2024-05-31 DIAGNOSIS — I10 ESSENTIAL HYPERTENSION: ICD-10-CM

## 2024-06-03 RX ORDER — LISINOPRIL 30 MG/1
30 TABLET ORAL DAILY
Qty: 90 TABLET | Refills: 1 | Status: SHIPPED | OUTPATIENT
Start: 2024-06-03

## 2024-06-04 ENCOUNTER — TREATMENT (OUTPATIENT)
Dept: PHYSICAL THERAPY | Facility: CLINIC | Age: 79
End: 2024-06-04
Payer: MEDICARE

## 2024-06-04 DIAGNOSIS — M70.62 TROCHANTERIC BURSITIS OF LEFT HIP: Primary | ICD-10-CM

## 2024-06-04 PROCEDURE — 97112 NEUROMUSCULAR REEDUCATION: CPT | Mod: GP | Performed by: PHYSICAL THERAPIST

## 2024-06-04 PROCEDURE — 97110 THERAPEUTIC EXERCISES: CPT | Mod: GP | Performed by: PHYSICAL THERAPIST

## 2024-06-04 NOTE — PROGRESS NOTES
Physical Therapy Treatment    Patient Name: Chas Melgar  MRN: [id number]  Today's Date: 6/10/2024  Time Calculation  Start Time: 0923  Stop Time: 0958  Time Calculation (min): 35 min       PT Therapeutic Procedures Time Entry  Neuromuscular Re-Education Time Entry: 15  Therapeutic Exercise Time Entry: 20                   INSURANCE:  Visit number: 15/16  [facility]    CURRENT PROBLEM:  1. Trochanteric bursitis of left hip          SUBJECTIVE:   General -   Pt is doing home exercises.  Lower back is hurting still (4-5/10)  Pt reports is currently in the process of moving so more sore.  Moving to apartment that will only now have a threshold step.    Pain -    Pain Location: Lumbar spine  Pain Best: 0/10  Pain Today: 4-5/10  Pain Worst: 6/10     Precautions -    Fall risk    OBJECTIVE:   Hip AROM (degrees) - WFL grossly except reduced ext     Hip MMT (/5) -  R hip Flexion: 4+  R hip ER: 4   R hip IR: 4+  R hip Abduction: 4+  L hip Flexion: 4+  L hip ER: 4  L hip IR: 4+  L hip Abduction: 4+     Lumbar AROM by Percent -  Lumbar Flexion: 90%  Lumbar Extension: 40%     Posture -   Rounded Shoulders  Forward Head  Lower Crossed Syndrome  Increased Lumbar Lordosis/APT  Forward Trunk Lean     Functional Assessment/Special Tests -  BULL positive bilateral  Mingo positive bilateral     Gait -  Pt is ambulating with a Rolator walker, but able to perform without an assistive device.  Noted: antalgic, decreased heel strike, decreased toe push off, reduced jo and step length, Trendelenburg, and trunk lean.     Outcome Measures -         Tinetti  Sitting Balance: Steady, safe  Arises: Able without using arms  Attempts to Arise: Able to arise, one attempt  Immediate Standing Balance (First 5 Seconds): Steady without walker or other support  Standing Balance: Steady but wide stance, uses cane or other support  Nudged: Steady without walker or other support  Eyes Closed: Steady  Turned 360 Degrees: Steadiness: Steady  Turned  360 Degrees: Continuity of Steps: Discontinuous steps  Sitting Down: Uses arms or not a smooth motion  Balance Score: 13  Initiation of Gait: No hesitancy  Step Height: R Swing Foot: Right foot does not clear floor completely with step  Step Length: R Swing Foot: Passes left stance foot  Step Height: L Swing Foot: Left foot does not clear floor completely with step  Step Length: L Swing Foot: Passes right stance foot  Step Symmetry: Right and left step appear equal  Step Continuity: Stopping or discontinuity between steps  Path: Mild/moderate deviation or uses walking aid  Trunk: Marked sway or uses walking aid  Walking Time: Heels apart  Gait Score: 5  Total Score: 18    Other Measures  Lower Extremity Funtional Score (LEFS): 20 (/80)    Treatment -   Therapeutic Exercise (30354) -  Assessment  Neuro Reeducation (73469) -    Jessica    OP Patient Education -   Seated Correct Posture     ASSESSMENT:     Pt has progressed well towards his goals and demonstrates improvements with both ROM and strength of B LEs at this time. Pt reports an increased ability to perform ADLs, as shown by his Tinetti and LEFS score. Pt was given and reviewed an updated HEP. Pt will therefore be discharged from skilled PT at this time and can call with any further questions/concerns.    PLAN:   OP PT Plan  PT Plan: No Additional PT interventions required at this time  Rehab Potential: Good  Plan of Care Agreement: Patient  Discharge pt from current episode of care.     Goals -   By discharge pt will achieve the following goals:   Pt will report less than a 2/10 pain at the worst. (Goal PROGRESSING)    Pt will report a significant improvement with  LEFS score. (Goal PROGRESSING)  Pt will have at least 4+/5 strength for the bilateral hips. (Goal MET)   Pt will have AROM to WFL for the bilateral hips and lumbar spine. (Goal PROGRESSING)  Pt will demonstrated an improvement with Tinetti Gait and Balance score by 4 points to improve  static/dynamic balance and reduce fall risk. (NEW GOAL)  Pt will be independent with HEP. (Goal MET)

## 2024-06-10 ENCOUNTER — INFUSION (OUTPATIENT)
Dept: HEMATOLOGY/ONCOLOGY | Facility: CLINIC | Age: 79
End: 2024-06-10
Payer: MEDICARE

## 2024-06-10 ENCOUNTER — LAB (OUTPATIENT)
Dept: LAB | Facility: CLINIC | Age: 79
End: 2024-06-10
Payer: MEDICARE

## 2024-06-10 VITALS
HEART RATE: 85 BPM | TEMPERATURE: 96.8 F | OXYGEN SATURATION: 97 % | DIASTOLIC BLOOD PRESSURE: 65 MMHG | SYSTOLIC BLOOD PRESSURE: 120 MMHG | RESPIRATION RATE: 16 BRPM | WEIGHT: 171.3 LBS | BODY MASS INDEX: 27.23 KG/M2

## 2024-06-10 DIAGNOSIS — D46.Z MDS (MYELODYSPLASTIC SYNDROME), LOW GRADE (MULTI): ICD-10-CM

## 2024-06-10 DIAGNOSIS — D46.1 REFRACTORY ANEMIA WITH RING SIDEROBLASTS (MULTI): ICD-10-CM

## 2024-06-10 LAB
ACANTHOCYTES BLD QL SMEAR: NORMAL
ALBUMIN SERPL BCP-MCNC: 4.4 G/DL (ref 3.4–5)
ALP SERPL-CCNC: 74 U/L (ref 33–136)
ALT SERPL W P-5'-P-CCNC: 13 U/L (ref 10–52)
ANION GAP SERPL CALC-SCNC: 15 MMOL/L (ref 10–20)
AST SERPL W P-5'-P-CCNC: 12 U/L (ref 9–39)
BASOPHILS # BLD AUTO: 0.07 X10*3/UL (ref 0–0.1)
BASOPHILS NFR BLD AUTO: 1.3 %
BILIRUB SERPL-MCNC: 1.3 MG/DL (ref 0–1.2)
BUN SERPL-MCNC: 30 MG/DL (ref 6–23)
CALCIUM SERPL-MCNC: 9.1 MG/DL (ref 8.6–10.3)
CHLORIDE SERPL-SCNC: 105 MMOL/L (ref 98–107)
CO2 SERPL-SCNC: 23 MMOL/L (ref 21–32)
CREAT SERPL-MCNC: 1.72 MG/DL (ref 0.5–1.3)
EGFRCR SERPLBLD CKD-EPI 2021: 40 ML/MIN/1.73M*2
EOSINOPHIL # BLD AUTO: 0.54 X10*3/UL (ref 0–0.4)
EOSINOPHIL NFR BLD AUTO: 10.4 %
ERYTHROCYTE [DISTWIDTH] IN BLOOD BY AUTOMATED COUNT: 21.7 % (ref 11.5–14.5)
GIANT PLATELETS BLD QL SMEAR: NORMAL
GLUCOSE SERPL-MCNC: 228 MG/DL (ref 74–99)
HCT VFR BLD AUTO: 32.1 % (ref 41–52)
HGB BLD-MCNC: 10.7 G/DL (ref 13.5–17.5)
IMM GRANULOCYTES # BLD AUTO: 0.02 X10*3/UL (ref 0–0.5)
IMM GRANULOCYTES NFR BLD AUTO: 0.4 % (ref 0–0.9)
LYMPHOCYTES # BLD AUTO: 1.72 X10*3/UL (ref 0.8–3)
LYMPHOCYTES NFR BLD AUTO: 33.1 %
MCH RBC QN AUTO: 37.8 PG (ref 26–34)
MCHC RBC AUTO-ENTMCNC: 33.3 G/DL (ref 32–36)
MCV RBC AUTO: 113 FL (ref 80–100)
MONOCYTES # BLD AUTO: 0.53 X10*3/UL (ref 0.05–0.8)
MONOCYTES NFR BLD AUTO: 10.2 %
NEUTROPHILS # BLD AUTO: 2.31 X10*3/UL (ref 1.6–5.5)
NEUTROPHILS NFR BLD AUTO: 44.6 %
NRBC BLD-RTO: ABNORMAL /100{WBCS}
OVALOCYTES BLD QL SMEAR: NORMAL
PLATELET # BLD AUTO: 143 X10*3/UL (ref 150–450)
POLYCHROMASIA BLD QL SMEAR: NORMAL
POTASSIUM SERPL-SCNC: 4.5 MMOL/L (ref 3.5–5.3)
PROT SERPL-MCNC: 6.7 G/DL (ref 6.4–8.2)
RBC # BLD AUTO: 2.83 X10*6/UL (ref 4.5–5.9)
RBC MORPH BLD: NORMAL
SODIUM SERPL-SCNC: 138 MMOL/L (ref 136–145)
WBC # BLD AUTO: 5.2 X10*3/UL (ref 4.4–11.3)

## 2024-06-10 PROCEDURE — 85025 COMPLETE CBC W/AUTO DIFF WBC: CPT

## 2024-06-10 PROCEDURE — 96372 THER/PROPH/DIAG INJ SC/IM: CPT

## 2024-06-10 PROCEDURE — 80053 COMPREHEN METABOLIC PANEL: CPT

## 2024-06-10 PROCEDURE — 2500000004 HC RX 250 GENERAL PHARMACY W/ HCPCS (ALT 636 FOR OP/ED): Mod: JW | Performed by: INTERNAL MEDICINE

## 2024-06-10 PROCEDURE — 36415 COLL VENOUS BLD VENIPUNCTURE: CPT

## 2024-06-10 RX ORDER — DIPHENHYDRAMINE HYDROCHLORIDE 50 MG/ML
50 INJECTION INTRAMUSCULAR; INTRAVENOUS AS NEEDED
OUTPATIENT
Start: 2024-07-01

## 2024-06-10 RX ORDER — ALBUTEROL SULFATE 0.83 MG/ML
3 SOLUTION RESPIRATORY (INHALATION) AS NEEDED
Status: DISCONTINUED | OUTPATIENT
Start: 2024-06-10 | End: 2024-06-10 | Stop reason: HOSPADM

## 2024-06-10 RX ORDER — EPINEPHRINE 0.3 MG/.3ML
0.3 INJECTION SUBCUTANEOUS EVERY 5 MIN PRN
OUTPATIENT
Start: 2024-07-01

## 2024-06-10 RX ORDER — ALBUTEROL SULFATE 0.83 MG/ML
3 SOLUTION RESPIRATORY (INHALATION) AS NEEDED
OUTPATIENT
Start: 2024-07-01

## 2024-06-10 RX ORDER — DIPHENHYDRAMINE HYDROCHLORIDE 50 MG/ML
50 INJECTION INTRAMUSCULAR; INTRAVENOUS AS NEEDED
Status: DISCONTINUED | OUTPATIENT
Start: 2024-06-10 | End: 2024-06-10 | Stop reason: HOSPADM

## 2024-06-10 RX ORDER — EPINEPHRINE 0.3 MG/.3ML
0.3 INJECTION SUBCUTANEOUS EVERY 5 MIN PRN
Status: DISCONTINUED | OUTPATIENT
Start: 2024-06-10 | End: 2024-06-10 | Stop reason: HOSPADM

## 2024-06-10 RX ORDER — FAMOTIDINE 10 MG/ML
20 INJECTION INTRAVENOUS ONCE AS NEEDED
Status: DISCONTINUED | OUTPATIENT
Start: 2024-06-10 | End: 2024-06-10 | Stop reason: HOSPADM

## 2024-06-10 RX ORDER — FAMOTIDINE 10 MG/ML
20 INJECTION INTRAVENOUS ONCE AS NEEDED
OUTPATIENT
Start: 2024-07-01

## 2024-06-10 RX ADMIN — LUSPATERCEPT 102.5 MG: 75 INJECTION, POWDER, LYOPHILIZED, FOR SOLUTION SUBCUTANEOUS at 13:58

## 2024-06-10 ASSESSMENT — PAIN SCALES - GENERAL: PAINLEVEL: 2

## 2024-06-10 NOTE — PATIENT INSTRUCTIONS
Hgb-10.7 today. Pt received luspatercept injection today without incident. RTC in 3 weeks for next injection.

## 2024-06-12 DIAGNOSIS — Z79.4 DIABETES MELLITUS TREATED WITH INSULIN AND ORAL MEDICATION (MULTI): ICD-10-CM

## 2024-06-12 DIAGNOSIS — Z79.84 DIABETES MELLITUS TREATED WITH INSULIN AND ORAL MEDICATION (MULTI): ICD-10-CM

## 2024-06-12 DIAGNOSIS — E11.9 DIABETES MELLITUS TREATED WITH INSULIN AND ORAL MEDICATION (MULTI): ICD-10-CM

## 2024-06-12 RX ORDER — GLIMEPIRIDE 4 MG/1
4 TABLET ORAL 2 TIMES DAILY
Qty: 180 TABLET | Refills: 1 | Status: SHIPPED | OUTPATIENT
Start: 2024-06-12

## 2024-06-14 ENCOUNTER — APPOINTMENT (OUTPATIENT)
Dept: OTOLARYNGOLOGY | Facility: CLINIC | Age: 79
End: 2024-06-14
Payer: MEDICARE

## 2024-06-14 VITALS — WEIGHT: 171.2 LBS | HEIGHT: 67 IN | BODY MASS INDEX: 26.87 KG/M2

## 2024-06-14 DIAGNOSIS — E04.1 THYROID NODULE: ICD-10-CM

## 2024-06-14 PROCEDURE — 1126F AMNT PAIN NOTED NONE PRSNT: CPT | Performed by: OTOLARYNGOLOGY

## 2024-06-14 PROCEDURE — 1036F TOBACCO NON-USER: CPT | Performed by: OTOLARYNGOLOGY

## 2024-06-14 PROCEDURE — 1159F MED LIST DOCD IN RCRD: CPT | Performed by: OTOLARYNGOLOGY

## 2024-06-14 PROCEDURE — 99203 OFFICE O/P NEW LOW 30 MIN: CPT | Performed by: OTOLARYNGOLOGY

## 2024-06-14 PROCEDURE — 1160F RVW MEDS BY RX/DR IN RCRD: CPT | Performed by: OTOLARYNGOLOGY

## 2024-06-14 ASSESSMENT — PAIN SCALES - GENERAL: PAINLEVEL: 0-NO PAIN

## 2024-06-14 NOTE — PROGRESS NOTES
ENT Outpatient Consultation    Chief Complaint: thyroid nodules  History Of Present Illness  Chas Melgar is a 79 y.o. male presents for evaluation of thyroid nodules.  These were found incidentally on a CT scan of the chest last fall.  Ultrasound was obtained showing a 1.7 cm TI-RADS 4 nodule and a 1.1 cm TI-RADS 4 nodule.  Thyroid function has been normal on lab work.  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), Other specified follicular disorders, Other specified postprocedural states (06/09/2021), Overweight (02/21/2022), Overweight (01/18/2022), Pain in right hip, Pain in right leg, Pain in unspecified hip (08/04/2021), Pain in unspecified shoulder (08/10/2021), Personal history of malignant neoplasm, unspecified (08/04/2021), Personal history of other diseases of male genital organs, Personal history of other diseases of the circulatory system (08/04/2021), Personal history of other diseases of the circulatory system (08/04/2021), Personal history of other diseases of the digestive system (10/14/2021), Personal history of other diseases of the digestive system (02/26/2022), Personal history of other diseases of the digestive system, Personal history of other diseases of the musculoskeletal system and connective tissue (08/04/2021), Personal history of other diseases of the musculoskeletal system and connective tissue (08/05/2021), Personal history of  other diseases of the respiratory system (11/17/2021), Personal history of other diseases of the respiratory system, Personal history of other diseases of the respiratory system, Personal history of other diseases of the respiratory system, Personal history of other diseases of urinary system (09/01/2022), Personal history of other endocrine, nutritional and metabolic disease (10/06/2021), Personal history of other endocrine, nutritional and metabolic disease (01/18/2022), Personal history of other malignant neoplasm of skin, Personal history of other specified conditions (09/01/2022), Personal history of other specified conditions, Personal history of other specified conditions, Persons encountering health services in other specified circumstances, Primary osteoarthritis, 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    Surgical History  He has a past surgical history that includes Other surgical history (11/07/2022); Other surgical history (10/14/2021); Other surgical history (10/14/2021); Other surgical history (10/14/2021); Other surgical history (10/14/2021); Other surgical history (10/14/2021); Other surgical history (11/17/2021); CT angio aorta and bilateral iliofemoral runoff w and or wo IV contrast (10/27/2020); and Rotator cuff repair.     Social History  He reports that he quit smoking about 38 years ago. His smoking use included cigarettes. He started smoking about 58 years ago. He has a 40 pack-year smoking history. He has never used smokeless tobacco. He reports current alcohol use of about 2.0 standard drinks of alcohol per week. He reports that he does not use drugs.    Family History  Family History   Problem Relation Name Age of Onset    Diabetes Mother  "Melinda andino     Coronary artery disease Father      Hypertension Other      Alzheimer's disease Other          Allergies  Aluminum and Nickel     Physical Exam:  CONSTITUTIONAL:  No acute distress  VOICE:  No hoarseness or other abnormality  RESPIRATION:  Breathing comfortably, no stridor  CV:  No clubbing/cyanosis/edema in hands  EYES:  EOM intact, sclera normal  NEURO:  Alert and oriented times 3, Cranial nerves II-XII grossly intact and symmetric bilaterally  HEAD AND FACE:  Symmetric facial features, no masses or lesions, sinuses non-tender to palpation  SALIVARY GLANDS:  Parotid and submandibular glands normal bilaterally  EARS:  Normal external ears, external auditory canals, and TMs to otoscopy, normal hearing to whispered voice.  NOSE:  External nose midline, anterior rhinoscopy is normal with limited visualization to the anterior aspect of the interior turbinates, no bleeding or drainage, no lesions  ORAL CAVITY/OROPHARYNX/LIPS:  Normal mucous membranes, normal floor of mouth/tongue/OP, no masses or lesions  PHARYNGEAL WALLS:  No masses or lesions  NECK/LYMPH:  No LAD, no thyroid masses, trachea midline  SKIN:  Neck skin is without scar or injury  PSYCH:  Alert and oriented with appropriate mood and affect     Last Recorded Vitals  Height 1.689 m (5' 6.5\"), weight 77.7 kg (171 lb 3.2 oz).    Relevant Results  Reviewed prior lab work and thyroid ultrasound    Assessment and Plan  79 y.o. male with incidental finding of thyroid nodules last fall on a CT scan.  We reviewed his ultrasound in detail as well as indications for needle biopsy.  He technically has 1 nodule that is criteria for biopsy measuring 1.7 cm and classified as a TI-RADS 4.  He would like to do a 6-month follow-up ultrasound before pursuing additional workup.  Order was provided and he will follow-up with me after ultrasound.  Advised him to follow-up earlier with any new concerns    Madi Kirkpatrick MD    "

## 2024-06-26 ENCOUNTER — HOSPITAL ENCOUNTER (OUTPATIENT)
Dept: RADIOLOGY | Facility: HOSPITAL | Age: 79
Discharge: HOME | End: 2024-06-26
Payer: MEDICARE

## 2024-06-26 DIAGNOSIS — D49.511 NEOPLASM OF UNSPECIFIED BEHAVIOR OF RIGHT KIDNEY: ICD-10-CM

## 2024-06-26 PROCEDURE — 76770 US EXAM ABDO BACK WALL COMP: CPT | Performed by: RADIOLOGY

## 2024-06-26 PROCEDURE — 76770 US EXAM ABDO BACK WALL COMP: CPT

## 2024-07-01 ENCOUNTER — INFUSION (OUTPATIENT)
Dept: HEMATOLOGY/ONCOLOGY | Facility: CLINIC | Age: 79
End: 2024-07-01
Payer: MEDICARE

## 2024-07-01 ENCOUNTER — LAB (OUTPATIENT)
Dept: LAB | Facility: CLINIC | Age: 79
End: 2024-07-01
Payer: MEDICARE

## 2024-07-01 VITALS
HEART RATE: 83 BPM | RESPIRATION RATE: 16 BRPM | SYSTOLIC BLOOD PRESSURE: 120 MMHG | WEIGHT: 171.74 LBS | TEMPERATURE: 97.5 F | BODY MASS INDEX: 27.3 KG/M2 | DIASTOLIC BLOOD PRESSURE: 68 MMHG | OXYGEN SATURATION: 94 %

## 2024-07-01 DIAGNOSIS — D46.1 REFRACTORY ANEMIA WITH RING SIDEROBLASTS (MULTI): ICD-10-CM

## 2024-07-01 DIAGNOSIS — D46.Z MDS (MYELODYSPLASTIC SYNDROME), LOW GRADE (MULTI): ICD-10-CM

## 2024-07-01 LAB
ACANTHOCYTES BLD QL SMEAR: NORMAL
ALBUMIN SERPL BCP-MCNC: 4.3 G/DL (ref 3.4–5)
ALP SERPL-CCNC: 65 U/L (ref 33–136)
ALT SERPL W P-5'-P-CCNC: 12 U/L (ref 10–52)
ANION GAP SERPL CALC-SCNC: 16 MMOL/L (ref 10–20)
AST SERPL W P-5'-P-CCNC: 10 U/L (ref 9–39)
BASO STIPL BLD QL SMEAR: PRESENT
BASOPHILS # BLD AUTO: 0.05 X10*3/UL (ref 0–0.1)
BASOPHILS NFR BLD AUTO: 0.9 %
BILIRUB SERPL-MCNC: 1 MG/DL (ref 0–1.2)
BUN SERPL-MCNC: 37 MG/DL (ref 6–23)
CALCIUM SERPL-MCNC: 9.3 MG/DL (ref 8.6–10.3)
CHLORIDE SERPL-SCNC: 104 MMOL/L (ref 98–107)
CO2 SERPL-SCNC: 23 MMOL/L (ref 21–32)
CREAT SERPL-MCNC: 2.09 MG/DL (ref 0.5–1.3)
EGFRCR SERPLBLD CKD-EPI 2021: 31 ML/MIN/1.73M*2
EOSINOPHIL # BLD AUTO: 0.62 X10*3/UL (ref 0–0.4)
EOSINOPHIL NFR BLD AUTO: 11.2 %
ERYTHROCYTE [DISTWIDTH] IN BLOOD BY AUTOMATED COUNT: 21.6 % (ref 11.5–14.5)
GIANT PLATELETS BLD QL SMEAR: NORMAL
GLUCOSE SERPL-MCNC: 395 MG/DL (ref 74–99)
HCT VFR BLD AUTO: 28.9 % (ref 41–52)
HGB BLD-MCNC: 9.5 G/DL (ref 13.5–17.5)
HYPOCHROMIA BLD QL SMEAR: NORMAL
IMM GRANULOCYTES # BLD AUTO: 0.02 X10*3/UL (ref 0–0.5)
IMM GRANULOCYTES NFR BLD AUTO: 0.4 % (ref 0–0.9)
LYMPHOCYTES # BLD AUTO: 1.72 X10*3/UL (ref 0.8–3)
LYMPHOCYTES NFR BLD AUTO: 30.9 %
MCH RBC QN AUTO: 37.1 PG (ref 26–34)
MCHC RBC AUTO-ENTMCNC: 32.9 G/DL (ref 32–36)
MCV RBC AUTO: 113 FL (ref 80–100)
MONOCYTES # BLD AUTO: 0.55 X10*3/UL (ref 0.05–0.8)
MONOCYTES NFR BLD AUTO: 9.9 %
NEUTROPHILS # BLD AUTO: 2.6 X10*3/UL (ref 1.6–5.5)
NEUTROPHILS NFR BLD AUTO: 46.7 %
NRBC BLD-RTO: ABNORMAL /100{WBCS}
OVALOCYTES BLD QL SMEAR: NORMAL
PLATELET # BLD AUTO: 167 X10*3/UL (ref 150–450)
POLYCHROMASIA BLD QL SMEAR: NORMAL
POTASSIUM SERPL-SCNC: 4.9 MMOL/L (ref 3.5–5.3)
PROT SERPL-MCNC: 6.6 G/DL (ref 6.4–8.2)
RBC # BLD AUTO: 2.56 X10*6/UL (ref 4.5–5.9)
RBC MORPH BLD: NORMAL
SCHISTOCYTES BLD QL SMEAR: NORMAL
SODIUM SERPL-SCNC: 138 MMOL/L (ref 136–145)
WBC # BLD AUTO: 5.6 X10*3/UL (ref 4.4–11.3)

## 2024-07-01 PROCEDURE — 36415 COLL VENOUS BLD VENIPUNCTURE: CPT

## 2024-07-01 PROCEDURE — 96372 THER/PROPH/DIAG INJ SC/IM: CPT

## 2024-07-01 PROCEDURE — 80053 COMPREHEN METABOLIC PANEL: CPT

## 2024-07-01 PROCEDURE — 85025 COMPLETE CBC W/AUTO DIFF WBC: CPT

## 2024-07-01 PROCEDURE — 2500000004 HC RX 250 GENERAL PHARMACY W/ HCPCS (ALT 636 FOR OP/ED): Performed by: INTERNAL MEDICINE

## 2024-07-01 RX ORDER — ALBUTEROL SULFATE 0.83 MG/ML
3 SOLUTION RESPIRATORY (INHALATION) AS NEEDED
Status: DISCONTINUED | OUTPATIENT
Start: 2024-07-01 | End: 2024-07-01 | Stop reason: HOSPADM

## 2024-07-01 RX ORDER — FAMOTIDINE 10 MG/ML
20 INJECTION INTRAVENOUS ONCE AS NEEDED
OUTPATIENT
Start: 2024-07-22

## 2024-07-01 RX ORDER — ALBUTEROL SULFATE 0.83 MG/ML
3 SOLUTION RESPIRATORY (INHALATION) AS NEEDED
OUTPATIENT
Start: 2024-07-22

## 2024-07-01 RX ORDER — DIPHENHYDRAMINE HYDROCHLORIDE 50 MG/ML
50 INJECTION INTRAMUSCULAR; INTRAVENOUS AS NEEDED
Status: DISCONTINUED | OUTPATIENT
Start: 2024-07-01 | End: 2024-07-01 | Stop reason: HOSPADM

## 2024-07-01 RX ORDER — EPINEPHRINE 0.3 MG/.3ML
0.3 INJECTION SUBCUTANEOUS EVERY 5 MIN PRN
Status: DISCONTINUED | OUTPATIENT
Start: 2024-07-01 | End: 2024-07-01 | Stop reason: HOSPADM

## 2024-07-01 RX ORDER — DIPHENHYDRAMINE HYDROCHLORIDE 50 MG/ML
50 INJECTION INTRAMUSCULAR; INTRAVENOUS AS NEEDED
OUTPATIENT
Start: 2024-07-22

## 2024-07-01 RX ORDER — FAMOTIDINE 10 MG/ML
20 INJECTION INTRAVENOUS ONCE AS NEEDED
Status: DISCONTINUED | OUTPATIENT
Start: 2024-07-01 | End: 2024-07-01 | Stop reason: HOSPADM

## 2024-07-01 RX ORDER — EPINEPHRINE 0.3 MG/.3ML
0.3 INJECTION SUBCUTANEOUS EVERY 5 MIN PRN
OUTPATIENT
Start: 2024-07-22

## 2024-07-01 ASSESSMENT — PAIN SCALES - GENERAL: PAINLEVEL: 4

## 2024-07-01 NOTE — PROGRESS NOTES
Pt here for luspatercept. Glucose 395, Crt 2.09. Dr Bowser notified. OK to proceed with injection today, but pt should follow-up with PCP regarding hyperglycemia. Per pt, he just saw his PCP and his insulin dose was increased, does not have another follow-up until August. Secure chat sent to Dr Helm about lab results, requesting a sooner follow-up. Awaiting response.

## 2024-07-13 DIAGNOSIS — I10 ESSENTIAL HYPERTENSION: ICD-10-CM

## 2024-07-16 RX ORDER — HYDROCHLOROTHIAZIDE 25 MG/1
25 TABLET ORAL DAILY
Qty: 90 TABLET | Refills: 1 | Status: SHIPPED | OUTPATIENT
Start: 2024-07-16

## 2024-07-18 ENCOUNTER — OFFICE VISIT (OUTPATIENT)
Dept: ORTHOPEDIC SURGERY | Facility: CLINIC | Age: 79
End: 2024-07-18
Payer: MEDICARE

## 2024-07-18 DIAGNOSIS — M70.62 TROCHANTERIC BURSITIS OF LEFT HIP: Primary | ICD-10-CM

## 2024-07-18 PROCEDURE — 99212 OFFICE O/P EST SF 10 MIN: CPT | Performed by: ORTHOPAEDIC SURGERY

## 2024-07-18 PROCEDURE — 99213 OFFICE O/P EST LOW 20 MIN: CPT | Performed by: ORTHOPAEDIC SURGERY

## 2024-07-18 NOTE — PROGRESS NOTES
History of present illness: Left hip abductor muscle tear or deficiency.    A series of injections first help with the second 1 did not he did exercise therapy program and now has pain over the left hip trochanteric bursa    Physical exam:    General: No acute distress or breathing difficulty or discomfort, pleasant and cooperative with the examination.    Extremities: The affected left hip was examined and inspected.  There was tenderness to touch along the groin and over the lateral aspect of the hip over the bursal area.  Hip joint moves freely.    There was no pain over the groin area to internal/external rotation abduction.    Palpable reproducible pain was reproduced with palpation over the lateral trochanteric process.  There was a tight IT band with a positive Tito sign.      The skin was intact without breakdown or open wound.  Old incisions of present were all healed.  There was mild crepitus seen with internal and external rotation and range of motion without evidence of gross instability.    Inspection of the low back showed normal curvature integrity of the skin.  The strength and stability of the low back and ligaments were within normal limits.    There was a normal straight leg raise with no foot drop, numbness or tingling in the bilateral lower extremities.  Sensation, reflexes and pulses in the foot and ankle are preserved and present.  There was no obvious effusion.    Range of motion showed flexion to beyond 100 degrees degrees, abduction to 30 degrees, external rotation to 15 degrees and 18 degrees of internal rotation.  The patient had the ability to bear weight but with discomfort.  The patient's gait antalgic  secondary to discomfort          Diagnostic studies: No new x-ray    Impression: Hip abductor muscle possible tear with hip bursitis now not responding to 6 months of conservative treatment    Plan: MRI left hip looking for abductor muscle tear

## 2024-07-22 ENCOUNTER — LAB (OUTPATIENT)
Dept: LAB | Facility: CLINIC | Age: 79
End: 2024-07-22
Payer: MEDICARE

## 2024-07-22 ENCOUNTER — INFUSION (OUTPATIENT)
Dept: HEMATOLOGY/ONCOLOGY | Facility: CLINIC | Age: 79
End: 2024-07-22
Payer: MEDICARE

## 2024-07-22 VITALS
OXYGEN SATURATION: 97 % | DIASTOLIC BLOOD PRESSURE: 62 MMHG | WEIGHT: 171.08 LBS | SYSTOLIC BLOOD PRESSURE: 134 MMHG | HEART RATE: 78 BPM | BODY MASS INDEX: 27.2 KG/M2 | TEMPERATURE: 97.2 F | RESPIRATION RATE: 16 BRPM

## 2024-07-22 DIAGNOSIS — D46.1 REFRACTORY ANEMIA WITH RING SIDEROBLASTS (MULTI): ICD-10-CM

## 2024-07-22 DIAGNOSIS — D46.Z MDS (MYELODYSPLASTIC SYNDROME), LOW GRADE (MULTI): ICD-10-CM

## 2024-07-22 LAB
ALBUMIN SERPL BCP-MCNC: 4.6 G/DL (ref 3.4–5)
ALP SERPL-CCNC: 67 U/L (ref 33–136)
ALT SERPL W P-5'-P-CCNC: 12 U/L (ref 10–52)
ANION GAP SERPL CALC-SCNC: 14 MMOL/L (ref 10–20)
AST SERPL W P-5'-P-CCNC: 11 U/L (ref 9–39)
BASOPHILS # BLD AUTO: 0.07 X10*3/UL (ref 0–0.1)
BASOPHILS NFR BLD AUTO: 1.2 %
BILIRUB SERPL-MCNC: 1.2 MG/DL (ref 0–1.2)
BUN SERPL-MCNC: 46 MG/DL (ref 6–23)
CALCIUM SERPL-MCNC: 9.2 MG/DL (ref 8.6–10.3)
CHLORIDE SERPL-SCNC: 101 MMOL/L (ref 98–107)
CO2 SERPL-SCNC: 22 MMOL/L (ref 21–32)
CREAT SERPL-MCNC: 2.07 MG/DL (ref 0.5–1.3)
EGFRCR SERPLBLD CKD-EPI 2021: 32 ML/MIN/1.73M*2
EOSINOPHIL # BLD AUTO: 0.54 X10*3/UL (ref 0–0.4)
EOSINOPHIL NFR BLD AUTO: 9.2 %
ERYTHROCYTE [DISTWIDTH] IN BLOOD BY AUTOMATED COUNT: 22.2 % (ref 11.5–14.5)
GLUCOSE SERPL-MCNC: 399 MG/DL (ref 74–99)
HCT VFR BLD AUTO: 30.5 % (ref 41–52)
HGB BLD-MCNC: 10 G/DL (ref 13.5–17.5)
IMM GRANULOCYTES # BLD AUTO: 0.01 X10*3/UL (ref 0–0.5)
IMM GRANULOCYTES NFR BLD AUTO: 0.2 % (ref 0–0.9)
LYMPHOCYTES # BLD AUTO: 1.63 X10*3/UL (ref 0.8–3)
LYMPHOCYTES NFR BLD AUTO: 27.7 %
MCH RBC QN AUTO: 37.5 PG (ref 26–34)
MCHC RBC AUTO-ENTMCNC: 32.8 G/DL (ref 32–36)
MCV RBC AUTO: 114 FL (ref 80–100)
MONOCYTES # BLD AUTO: 0.51 X10*3/UL (ref 0.05–0.8)
MONOCYTES NFR BLD AUTO: 8.7 %
NEUTROPHILS # BLD AUTO: 3.12 X10*3/UL (ref 1.6–5.5)
NEUTROPHILS NFR BLD AUTO: 53 %
NRBC BLD-RTO: ABNORMAL /100{WBCS}
PLATELET # BLD AUTO: 159 X10*3/UL (ref 150–450)
POTASSIUM SERPL-SCNC: 5 MMOL/L (ref 3.5–5.3)
PROT SERPL-MCNC: 6.5 G/DL (ref 6.4–8.2)
RBC # BLD AUTO: 2.67 X10*6/UL (ref 4.5–5.9)
SODIUM SERPL-SCNC: 132 MMOL/L (ref 136–145)
WBC # BLD AUTO: 5.9 X10*3/UL (ref 4.4–11.3)

## 2024-07-22 PROCEDURE — 85025 COMPLETE CBC W/AUTO DIFF WBC: CPT

## 2024-07-22 PROCEDURE — 84075 ASSAY ALKALINE PHOSPHATASE: CPT

## 2024-07-22 PROCEDURE — 36415 COLL VENOUS BLD VENIPUNCTURE: CPT

## 2024-07-22 PROCEDURE — 2500000004 HC RX 250 GENERAL PHARMACY W/ HCPCS (ALT 636 FOR OP/ED): Performed by: INTERNAL MEDICINE

## 2024-07-22 PROCEDURE — 96372 THER/PROPH/DIAG INJ SC/IM: CPT

## 2024-07-22 RX ORDER — FAMOTIDINE 10 MG/ML
20 INJECTION INTRAVENOUS ONCE AS NEEDED
OUTPATIENT
Start: 2024-08-12

## 2024-07-22 RX ORDER — DIPHENHYDRAMINE HYDROCHLORIDE 50 MG/ML
50 INJECTION INTRAMUSCULAR; INTRAVENOUS AS NEEDED
OUTPATIENT
Start: 2024-08-12

## 2024-07-22 RX ORDER — EPINEPHRINE 0.3 MG/.3ML
0.3 INJECTION SUBCUTANEOUS EVERY 5 MIN PRN
OUTPATIENT
Start: 2024-08-12

## 2024-07-22 RX ORDER — ALBUTEROL SULFATE 0.83 MG/ML
3 SOLUTION RESPIRATORY (INHALATION) AS NEEDED
OUTPATIENT
Start: 2024-08-12

## 2024-07-22 ASSESSMENT — PAIN SCALES - GENERAL: PAINLEVEL: 0-NO PAIN

## 2024-07-22 NOTE — PROGRESS NOTES
Pt here for luspatercept injection, tolerated well. Discharged in stable condition, has schedule for follow up.

## 2024-07-31 ENCOUNTER — APPOINTMENT (OUTPATIENT)
Dept: NEPHROLOGY | Facility: CLINIC | Age: 79
End: 2024-07-31
Payer: MEDICARE

## 2024-07-31 VITALS
DIASTOLIC BLOOD PRESSURE: 68 MMHG | HEART RATE: 80 BPM | BODY MASS INDEX: 26.84 KG/M2 | SYSTOLIC BLOOD PRESSURE: 122 MMHG | HEIGHT: 67 IN | WEIGHT: 171 LBS

## 2024-07-31 DIAGNOSIS — E21.3 HYPERPARATHYROIDISM (MULTI): ICD-10-CM

## 2024-07-31 DIAGNOSIS — N18.32 STAGE 3B CHRONIC KIDNEY DISEASE (MULTI): ICD-10-CM

## 2024-07-31 DIAGNOSIS — R80.8 OTHER PROTEINURIA: ICD-10-CM

## 2024-07-31 DIAGNOSIS — I10 ESSENTIAL HYPERTENSION: ICD-10-CM

## 2024-07-31 DIAGNOSIS — E08.22 DIABETES MELLITUS DUE TO UNDERLYING CONDITION WITH DIABETIC CHRONIC KIDNEY DISEASE, UNSPECIFIED CKD STAGE, UNSPECIFIED WHETHER LONG TERM INSULIN USE (MULTI): Primary | ICD-10-CM

## 2024-07-31 PROCEDURE — 1036F TOBACCO NON-USER: CPT | Performed by: INTERNAL MEDICINE

## 2024-07-31 PROCEDURE — 3078F DIAST BP <80 MM HG: CPT | Performed by: INTERNAL MEDICINE

## 2024-07-31 PROCEDURE — 1159F MED LIST DOCD IN RCRD: CPT | Performed by: INTERNAL MEDICINE

## 2024-07-31 PROCEDURE — 3074F SYST BP LT 130 MM HG: CPT | Performed by: INTERNAL MEDICINE

## 2024-07-31 PROCEDURE — 99214 OFFICE O/P EST MOD 30 MIN: CPT | Performed by: INTERNAL MEDICINE

## 2024-07-31 NOTE — PROGRESS NOTES
Patient:   CATALINA MUSE            MRN: [id number]            FIN: [id number]              Age:   52 years     Sex:  FEMALE     :  65   Associated Diagnoses:   None   Author:   ALEXIS NICOLAS     Basic Information   Time seen: Date & time 20 01:47:00.     Review of Systems   ROS Info.   Physical Examination             Additional physical exam information.   "Chas Melgar   79 y.o.    @@  [facility]/Room: [id number]/Room/bed info not found    Subjective:   The patient is being seen for a routine clinic follow-up of chronic kidney disease. Recently, the disease has been stable. Disease complications:  No hyperkalemia, no hypocalcemia, no hyperphosphatemia, no metabolic acidosis, no coagulopathy, no uremic encephalopathy, no neuropathy and no renal osteodystrophy. The patient is currently asymptomatic. No associated symptoms are reported.       Meds:   Current Outpatient Medications   Medication Sig Dispense Refill    aspirin 81 mg EC tablet Take 1 tablet (81 mg) by mouth once daily.      BD Ultra-Fine Short Pen Needle 31 gauge x 5/16\" needle USE ONCE DAILY WITH LEVEMIR 100 each 1    calcitriol (Rocaltrol) 0.25 mcg capsule Take 1 capsule (0.25 mcg) by mouth every other day. 45 capsule 3    cyanocobalamin (Vitamin B-12) 1,000 mcg tablet Take 2 tablets (2,000 mcg) by mouth once daily.      dapagliflozin propanediol (Farxiga) 10 mg Take 1 tablet (10 mg) by mouth once daily in the morning. Take before meals. 90 tablet 1    dilTIAZem CD (Cardizem CD) 240 mg 24 hr capsule TAKE 1 CAPSULE BY MOUTH DAILY 90 capsule 3    folic acid 0.8 mg capsule Take 1 tablet by mouth once daily at bedtime.      glimepiride (Amaryl) 4 mg tablet Take 1 tablet (4 mg) by mouth 2 times a day. 180 tablet 1    hydroCHLOROthiazide (HYDRODiuril) 25 mg tablet TAKE 1 TABLET BY MOUTH once DAILY 90 tablet 1    insulin glargine (Basaglar KwikPen U-100 Insulin) 100 unit/mL (3 mL) pen Inject 20 Units under the skin once daily at bedtime. 15 mL 1    isosorbide mononitrate ER (Imdur) 30 mg 24 hr tablet Take 1 tablet (30 mg) by mouth once daily. 90 tablet 3    lisinopril 30 mg tablet TAKE 1 TABLET BY MOUTH ONCE DAILY 90 tablet 1    nitroglycerin (Nitrostat) 0.4 mg SL tablet Place 1 tablet (0.4 mg) under the tongue every 5 minutes if needed for chest pain. 25 tablet 5    pantoprazole (ProtoNix) 40 mg EC tablet Take " 1 tablet (40 mg) by mouth once daily. 90 tablet 3    simvastatin (Zocor) 20 mg tablet Take 1 tablet (20 mg) by mouth once daily at bedtime. 90 tablet 3    tamsulosin (Flomax) 0.4 mg 24 hr capsule Take 1 capsule (0.4 mg) by mouth once daily.       No current facility-administered medications for this visit.          ROS:  The patient is awake and oriented. No dizziness or lightheadedness. No chills and no fever. No headaches. No nausea and no vomiting. No shortness of breath. No cough. No sputum. No chest pain. No chest tightness. No abdominal pain. No diarrhea and no constipation. No hematemesis or hemoptysis. No hematuria. No rectal bleeding. No melena. No epistaxis. No urinary symptoms. No flank pain. No leg edema. No leg pain. No weakness. No itching. Overall, the rest of the review of systems is also negative.  12 point review of systems otherwise negative as stated in HPI.        Physical Examination:        Vitals:    07/31/24 0929   BP: 122/68   Pulse: 80     General: The patient is awake, oriented, and is not in any distress.  Head and Neck: Normocephalic. No periorbital edema.  Eyes: non-icteric  Respiratory: Symmetric air entry. Symmetric chest expansion.No respiratory distress.  Cardiovascular: Symmetric peripheral pulses.  Skin: No maculopapular rash.  Abdomen: soft, nt/nd  Musculoskeletal: No peripheral edema in both left and right upper extremities.  No edema in either left or right lower extremities.  Neuro Exam: Speech is fluent. Moves extremities.    Imaging:  === 06/26/24 ===    US RENAL COMPLETE    - Impression -  Bilateral renal cysts.    Marked prostatomegaly.    Known right renal mass not well visualized sonographically.    Signed by: Bernice Garcias 6/27/2024 1:48 PM  Dictation workstation:   [id number]       Blood Labs:  No results found for this or any previous visit (from the past 24 hour(s)).   Lab Results   Component Value Date    .7 (H) 03/14/2024    PROTUR 66 (H) 06/30/2023     PHOS 4.0 06/30/2023      Lab Results   Component Value Date    GLUCOSE 399 (H) 07/22/2024    CALCIUM 9.2 07/22/2024     (L) 07/22/2024    K 5.0 07/22/2024    CO2 22 07/22/2024     07/22/2024    BUN 46 (H) 07/22/2024    CREATININE 2.07 (H) 07/22/2024         Assessment and Plan:  1. Chronic kidney disease stage III. Last creatinine level is 2.07. Normal potassium and bicarb level.      2. Hypertension. Blood pressure is under control. Continue the current medications.     3. Proteinuria. He has about 710 mg proteinuria. It is most likely because of diabetic nephropathy. He is on lisinopril and Farxigas.  I added Kerendia to his medications.  We talked about low potassium diet and I instructed him to do a basic metabolic panel 3 to 4 weeks after he starts taking Kerendia.     4.  Secondary hyperparathyroidism.  I put him on calcitriol.    5. Hyponatremia: Na level is slightly on low side. Probably 2 to hydrochlorothiazide. If Na level goes lower, will consider taking him off hydrochlorothiazide.        I will see him in about 4 months for follow-up.           Siddhartha Burnett MD  Senior Attending Physician  Director of Onco-Nephrology Program  Division of Nephrology & Hypertension  [facility]

## 2024-08-03 ENCOUNTER — HOSPITAL ENCOUNTER (OUTPATIENT)
Dept: RADIOLOGY | Facility: HOSPITAL | Age: 79
Discharge: HOME | End: 2024-08-03
Payer: MEDICARE

## 2024-08-03 DIAGNOSIS — M70.62 TROCHANTERIC BURSITIS OF LEFT HIP: ICD-10-CM

## 2024-08-03 PROCEDURE — 73721 MRI JNT OF LWR EXTRE W/O DYE: CPT | Mod: LT

## 2024-08-12 ENCOUNTER — INFUSION (OUTPATIENT)
Dept: HEMATOLOGY/ONCOLOGY | Facility: CLINIC | Age: 79
End: 2024-08-12
Payer: MEDICARE

## 2024-08-12 ENCOUNTER — LAB (OUTPATIENT)
Dept: LAB | Facility: CLINIC | Age: 79
End: 2024-08-12
Payer: MEDICARE

## 2024-08-12 VITALS
OXYGEN SATURATION: 96 % | HEART RATE: 94 BPM | BODY MASS INDEX: 27.16 KG/M2 | RESPIRATION RATE: 16 BRPM | DIASTOLIC BLOOD PRESSURE: 66 MMHG | TEMPERATURE: 97.3 F | SYSTOLIC BLOOD PRESSURE: 135 MMHG | WEIGHT: 170.86 LBS

## 2024-08-12 DIAGNOSIS — D46.Z MDS (MYELODYSPLASTIC SYNDROME), LOW GRADE (MULTI): ICD-10-CM

## 2024-08-12 DIAGNOSIS — D46.1 REFRACTORY ANEMIA WITH RING SIDEROBLASTS (MULTI): ICD-10-CM

## 2024-08-12 LAB
ALBUMIN SERPL BCP-MCNC: 4.4 G/DL (ref 3.4–5)
ALP SERPL-CCNC: 69 U/L (ref 33–136)
ALT SERPL W P-5'-P-CCNC: 12 U/L (ref 10–52)
ANION GAP SERPL CALC-SCNC: 12 MMOL/L (ref 10–20)
AST SERPL W P-5'-P-CCNC: 11 U/L (ref 9–39)
BASOPHILS # BLD AUTO: 0.07 X10*3/UL (ref 0–0.1)
BASOPHILS NFR BLD AUTO: 1.2 %
BILIRUB SERPL-MCNC: 1 MG/DL (ref 0–1.2)
BUN SERPL-MCNC: 35 MG/DL (ref 6–23)
CALCIUM SERPL-MCNC: 9.6 MG/DL (ref 8.6–10.3)
CHLORIDE SERPL-SCNC: 103 MMOL/L (ref 98–107)
CO2 SERPL-SCNC: 27 MMOL/L (ref 21–32)
CREAT SERPL-MCNC: 1.72 MG/DL (ref 0.5–1.3)
EGFRCR SERPLBLD CKD-EPI 2021: 40 ML/MIN/1.73M*2
EOSINOPHIL # BLD AUTO: 0.55 X10*3/UL (ref 0–0.4)
EOSINOPHIL NFR BLD AUTO: 9.1 %
ERYTHROCYTE [DISTWIDTH] IN BLOOD BY AUTOMATED COUNT: 21.7 % (ref 11.5–14.5)
GIANT PLATELETS BLD QL SMEAR: NORMAL
GLUCOSE SERPL-MCNC: 246 MG/DL (ref 74–99)
HCT VFR BLD AUTO: 30.5 % (ref 41–52)
HGB BLD-MCNC: 10.3 G/DL (ref 13.5–17.5)
IMM GRANULOCYTES # BLD AUTO: 0.02 X10*3/UL (ref 0–0.5)
IMM GRANULOCYTES NFR BLD AUTO: 0.3 % (ref 0–0.9)
LYMPHOCYTES # BLD AUTO: 1.86 X10*3/UL (ref 0.8–3)
LYMPHOCYTES NFR BLD AUTO: 30.7 %
MCH RBC QN AUTO: 38 PG (ref 26–34)
MCHC RBC AUTO-ENTMCNC: 33.8 G/DL (ref 32–36)
MCV RBC AUTO: 113 FL (ref 80–100)
MONOCYTES # BLD AUTO: 0.49 X10*3/UL (ref 0.05–0.8)
MONOCYTES NFR BLD AUTO: 8.1 %
NEUTROPHILS # BLD AUTO: 3.06 X10*3/UL (ref 1.6–5.5)
NEUTROPHILS NFR BLD AUTO: 50.6 %
NRBC BLD-RTO: ABNORMAL /100{WBCS}
OVALOCYTES BLD QL SMEAR: NORMAL
PLATELET # BLD AUTO: 181 X10*3/UL (ref 150–450)
POLYCHROMASIA BLD QL SMEAR: NORMAL
POTASSIUM SERPL-SCNC: 4.2 MMOL/L (ref 3.5–5.3)
PROT SERPL-MCNC: 6.7 G/DL (ref 6.4–8.2)
RBC # BLD AUTO: 2.71 X10*6/UL (ref 4.5–5.9)
RBC MORPH BLD: NORMAL
SODIUM SERPL-SCNC: 138 MMOL/L (ref 136–145)
WBC # BLD AUTO: 6.1 X10*3/UL (ref 4.4–11.3)

## 2024-08-12 PROCEDURE — 96372 THER/PROPH/DIAG INJ SC/IM: CPT

## 2024-08-12 PROCEDURE — 85025 COMPLETE CBC W/AUTO DIFF WBC: CPT

## 2024-08-12 PROCEDURE — 2500000004 HC RX 250 GENERAL PHARMACY W/ HCPCS (ALT 636 FOR OP/ED): Mod: JZ | Performed by: INTERNAL MEDICINE

## 2024-08-12 PROCEDURE — 80053 COMPREHEN METABOLIC PANEL: CPT

## 2024-08-12 PROCEDURE — 36415 COLL VENOUS BLD VENIPUNCTURE: CPT

## 2024-08-12 RX ORDER — ALBUTEROL SULFATE 0.83 MG/ML
3 SOLUTION RESPIRATORY (INHALATION) AS NEEDED
Status: DISCONTINUED | OUTPATIENT
Start: 2024-08-12 | End: 2024-08-12 | Stop reason: HOSPADM

## 2024-08-12 RX ORDER — DIPHENHYDRAMINE HYDROCHLORIDE 50 MG/ML
50 INJECTION INTRAMUSCULAR; INTRAVENOUS AS NEEDED
OUTPATIENT
Start: 2024-09-02

## 2024-08-12 RX ORDER — FAMOTIDINE 10 MG/ML
20 INJECTION INTRAVENOUS ONCE AS NEEDED
OUTPATIENT
Start: 2024-09-02

## 2024-08-12 RX ORDER — FAMOTIDINE 10 MG/ML
20 INJECTION INTRAVENOUS ONCE AS NEEDED
Status: DISCONTINUED | OUTPATIENT
Start: 2024-08-12 | End: 2024-08-12 | Stop reason: HOSPADM

## 2024-08-12 RX ORDER — EPINEPHRINE 0.3 MG/.3ML
0.3 INJECTION SUBCUTANEOUS EVERY 5 MIN PRN
Status: DISCONTINUED | OUTPATIENT
Start: 2024-08-12 | End: 2024-08-12 | Stop reason: HOSPADM

## 2024-08-12 RX ORDER — ALBUTEROL SULFATE 0.83 MG/ML
3 SOLUTION RESPIRATORY (INHALATION) AS NEEDED
OUTPATIENT
Start: 2024-09-02

## 2024-08-12 RX ORDER — DIPHENHYDRAMINE HYDROCHLORIDE 50 MG/ML
50 INJECTION INTRAMUSCULAR; INTRAVENOUS AS NEEDED
Status: DISCONTINUED | OUTPATIENT
Start: 2024-08-12 | End: 2024-08-12 | Stop reason: HOSPADM

## 2024-08-12 RX ORDER — EPINEPHRINE 0.3 MG/.3ML
0.3 INJECTION SUBCUTANEOUS EVERY 5 MIN PRN
OUTPATIENT
Start: 2024-09-02

## 2024-08-15 ENCOUNTER — APPOINTMENT (OUTPATIENT)
Dept: PRIMARY CARE | Facility: CLINIC | Age: 79
End: 2024-08-15
Payer: MEDICARE

## 2024-08-21 ENCOUNTER — LAB (OUTPATIENT)
Dept: LAB | Facility: LAB | Age: 79
End: 2024-08-21
Payer: MEDICARE

## 2024-08-21 DIAGNOSIS — D46.Z MDS (MYELODYSPLASTIC SYNDROME), LOW GRADE (MULTI): ICD-10-CM

## 2024-08-21 DIAGNOSIS — E08.22 DIABETES MELLITUS DUE TO UNDERLYING CONDITION WITH DIABETIC CHRONIC KIDNEY DISEASE, UNSPECIFIED CKD STAGE, UNSPECIFIED WHETHER LONG TERM INSULIN USE (MULTI): ICD-10-CM

## 2024-08-21 DIAGNOSIS — D46.1 REFRACTORY ANEMIA WITH RING SIDEROBLASTS (MULTI): ICD-10-CM

## 2024-08-21 DIAGNOSIS — N18.32 STAGE 3B CHRONIC KIDNEY DISEASE (MULTI): ICD-10-CM

## 2024-08-21 DIAGNOSIS — E21.3 HYPERPARATHYROIDISM (MULTI): ICD-10-CM

## 2024-08-21 DIAGNOSIS — I10 ESSENTIAL HYPERTENSION: ICD-10-CM

## 2024-08-21 DIAGNOSIS — R80.8 OTHER PROTEINURIA: ICD-10-CM

## 2024-08-21 DIAGNOSIS — Z79.4 DIABETES MELLITUS DUE TO UNDERLYING CONDITION WITH HYPERGLYCEMIA, WITH LONG-TERM CURRENT USE OF INSULIN (MULTI): ICD-10-CM

## 2024-08-21 DIAGNOSIS — E08.65 DIABETES MELLITUS DUE TO UNDERLYING CONDITION WITH HYPERGLYCEMIA, WITH LONG-TERM CURRENT USE OF INSULIN (MULTI): ICD-10-CM

## 2024-08-21 LAB
ALBUMIN SERPL BCP-MCNC: 4.5 G/DL (ref 3.4–5)
ALP SERPL-CCNC: 62 U/L (ref 33–136)
ALT SERPL W P-5'-P-CCNC: 15 U/L (ref 10–52)
ANION GAP SERPL CALC-SCNC: 17 MMOL/L (ref 10–20)
AST SERPL W P-5'-P-CCNC: 14 U/L (ref 9–39)
BASO STIPL BLD QL SMEAR: PRESENT
BASOPHILS # BLD AUTO: 0.09 X10*3/UL (ref 0–0.1)
BASOPHILS NFR BLD AUTO: 0.9 %
BILIRUB SERPL-MCNC: 1.2 MG/DL (ref 0–1.2)
BUN SERPL-MCNC: 44 MG/DL (ref 6–23)
CALCIUM SERPL-MCNC: 9.3 MG/DL (ref 8.6–10.3)
CHLORIDE SERPL-SCNC: 103 MMOL/L (ref 98–107)
CO2 SERPL-SCNC: 23 MMOL/L (ref 21–32)
CREAT SERPL-MCNC: 1.92 MG/DL (ref 0.5–1.3)
CREAT UR-MCNC: 102.4 MG/DL (ref 20–370)
EGFRCR SERPLBLD CKD-EPI 2021: 35 ML/MIN/1.73M*2
EOSINOPHIL # BLD AUTO: 0.68 X10*3/UL (ref 0–0.4)
EOSINOPHIL NFR BLD AUTO: 6.8 %
ERYTHROCYTE [DISTWIDTH] IN BLOOD BY AUTOMATED COUNT: 22.5 % (ref 11.5–14.5)
EST. AVERAGE GLUCOSE BLD GHB EST-MCNC: 157 MG/DL
GIANT PLATELETS BLD QL SMEAR: NORMAL
GLUCOSE SERPL-MCNC: 156 MG/DL (ref 74–99)
HBA1C MFR BLD: 7.1 %
HCT VFR BLD AUTO: 31.1 % (ref 41–52)
HGB BLD-MCNC: 10.3 G/DL (ref 13.5–17.5)
HYPOCHROMIA BLD QL SMEAR: NORMAL
IMM GRANULOCYTES # BLD AUTO: 0.05 X10*3/UL (ref 0–0.5)
IMM GRANULOCYTES NFR BLD AUTO: 0.5 % (ref 0–0.9)
LYMPHOCYTES # BLD AUTO: 1.6 X10*3/UL (ref 0.8–3)
LYMPHOCYTES NFR BLD AUTO: 15.9 %
MCH RBC QN AUTO: 36.9 PG (ref 26–34)
MCHC RBC AUTO-ENTMCNC: 33.1 G/DL (ref 32–36)
MCV RBC AUTO: 112 FL (ref 80–100)
MONOCYTES # BLD AUTO: 0.84 X10*3/UL (ref 0.05–0.8)
MONOCYTES NFR BLD AUTO: 8.3 %
NEUTROPHILS # BLD AUTO: 6.8 X10*3/UL (ref 1.6–5.5)
NEUTROPHILS NFR BLD AUTO: 67.6 %
NRBC BLD-RTO: 0.3 /100 WBCS (ref 0–0)
OVALOCYTES BLD QL SMEAR: NORMAL
PLATELET # BLD AUTO: 187 X10*3/UL (ref 150–450)
POLYCHROMASIA BLD QL SMEAR: NORMAL
POTASSIUM SERPL-SCNC: 4.5 MMOL/L (ref 3.5–5.3)
PROT SERPL-MCNC: 6.9 G/DL (ref 6.4–8.2)
PROT UR-ACNC: 34 MG/DL (ref 5–25)
PROT/CREAT UR: 0.33 MG/MG CREAT (ref 0–0.17)
PTH-INTACT SERPL-MCNC: 40.2 PG/ML (ref 18.5–88)
RBC # BLD AUTO: 2.79 X10*6/UL (ref 4.5–5.9)
RBC MORPH BLD: NORMAL
SODIUM SERPL-SCNC: 138 MMOL/L (ref 136–145)
TARGETS BLD QL SMEAR: NORMAL
WBC # BLD AUTO: 10.1 X10*3/UL (ref 4.4–11.3)

## 2024-08-21 PROCEDURE — 82570 ASSAY OF URINE CREATININE: CPT

## 2024-08-21 PROCEDURE — 36415 COLL VENOUS BLD VENIPUNCTURE: CPT

## 2024-08-21 PROCEDURE — 83036 HEMOGLOBIN GLYCOSYLATED A1C: CPT

## 2024-08-21 PROCEDURE — 83970 ASSAY OF PARATHORMONE: CPT

## 2024-08-21 PROCEDURE — 85025 COMPLETE CBC W/AUTO DIFF WBC: CPT

## 2024-08-21 PROCEDURE — 84156 ASSAY OF PROTEIN URINE: CPT

## 2024-08-21 PROCEDURE — 80053 COMPREHEN METABOLIC PANEL: CPT

## 2024-08-28 ENCOUNTER — APPOINTMENT (OUTPATIENT)
Dept: PRIMARY CARE | Facility: CLINIC | Age: 79
End: 2024-08-28
Payer: MEDICARE

## 2024-08-28 VITALS
TEMPERATURE: 96.6 F | HEIGHT: 66 IN | SYSTOLIC BLOOD PRESSURE: 101 MMHG | BODY MASS INDEX: 27.16 KG/M2 | HEART RATE: 82 BPM | WEIGHT: 169 LBS | DIASTOLIC BLOOD PRESSURE: 63 MMHG

## 2024-08-28 DIAGNOSIS — Z87.891 FORMER SMOKER: ICD-10-CM

## 2024-08-28 DIAGNOSIS — R26.81 UNSTEADY GAIT: ICD-10-CM
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
## 2024-08-28 DIAGNOSIS — Z00.00 HEALTH MAINTENANCE EXAMINATION: ICD-10-CM

## 2024-08-28 DIAGNOSIS — E04.1 THYROID NODULE: ICD-10-CM

## 2024-08-28 DIAGNOSIS — R53.1 WEAKNESS: ICD-10-CM

## 2024-08-28 DIAGNOSIS — I10 ESSENTIAL HYPERTENSION: ICD-10-CM

## 2024-08-28 DIAGNOSIS — R25.1 TREMOR OF LEFT HAND: ICD-10-CM

## 2024-08-28 PROCEDURE — 3078F DIAST BP <80 MM HG: CPT | Performed by: FAMILY MEDICINE

## 2024-08-28 PROCEDURE — 3074F SYST BP LT 130 MM HG: CPT | Performed by: FAMILY MEDICINE

## 2024-08-28 PROCEDURE — 1123F ACP DISCUSS/DSCN MKR DOCD: CPT | Performed by: FAMILY MEDICINE

## 2024-08-28 PROCEDURE — 1159F MED LIST DOCD IN RCRD: CPT | Performed by: FAMILY MEDICINE

## 2024-08-28 PROCEDURE — 1170F FXNL STATUS ASSESSED: CPT | Performed by: FAMILY MEDICINE

## 2024-08-28 PROCEDURE — 1036F TOBACCO NON-USER: CPT | Performed by: FAMILY MEDICINE

## 2024-08-28 PROCEDURE — 1160F RVW MEDS BY RX/DR IN RCRD: CPT | Performed by: FAMILY MEDICINE

## 2024-08-28 PROCEDURE — 99213 OFFICE O/P EST LOW 20 MIN: CPT | Performed by: FAMILY MEDICINE

## 2024-08-28 PROCEDURE — 1158F ADVNC CARE PLAN TLK DOCD: CPT | Performed by: FAMILY MEDICINE

## 2024-08-28 ASSESSMENT — ACTIVITIES OF DAILY LIVING (ADL)
TAKING_MEDICATION: INDEPENDENT
GROCERY_SHOPPING: INDEPENDENT
BATHING: INDEPENDENT
MANAGING_FINANCES: INDEPENDENT
DOING_HOUSEWORK: TOTAL CARE
DRESSING: INDEPENDENT

## 2024-08-28 NOTE — PROGRESS NOTES
Pat Hua is here for his annual wellness visit.  He is also here for follow-up on diabetes.  He states that he has been feeling well overall.  His only complaint is that of left knee pain for which she is seeing orthopedics regarding this.  He continues on his meds noted.  He tripped and fell about 3 weeks ago on a door jam.  He did not sustain any serious injury.  He has had some episodes of bowel incontinence but refuses to wear a adult diaper.  He has had some drooling and left hand tremor as well as unsteady gait.  He continues on his medications noted.  He sees Dr. Bowser for his chronic anemia and cardiology as well.    Patient ID: Chas Melgar is a 79 y.o. male who presents for Medicare Annual Wellness Visit Subsequent:    Problem List Items Addressed This Visit       Essential hypertension    Former smoker    BMI 27.0-27.9,adult    Thyroid nodule    Health maintenance examination     Other Visit Diagnoses       Unsteady gait        Weakness        Fall, sequela        Tremor of left hand               Past Medical History:   Diagnosis Date    Acute gastric ulcer with hemorrhage 09/07/2022    Acute gastric ulcer with bleeding    Acute upper respiratory infection, unspecified     URI, acute    Arthritis     Body mass index (BMI) 28.0-28.9, adult 10/19/2021    BMI 28.0-28.9,adult    Cancer (Multi)     Coronary artery disease     Diabetes mellitus (Multi)     Encounter for other preprocedural examination 10/14/2021    Pre-operative clearance    Encounter for screening for malignant neoplasm of prostate     Encounter for prostate cancer screening    Impingement syndrome of unspecified shoulder 07/22/2021    Impingement syndrome of shoulder region, unspecified laterality    Other abnormalities of breathing     Difficulty breathing    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)     Other specified disorders of pancreatic internal secretion    Other specified follicular  disorders     Actinic folliculitis    Other specified postprocedural states 06/09/2021    Status post arthroscopy of left shoulder    Overweight 02/21/2022    Overweight with body mass index (BMI) of 29 to 29.9 in adult    Overweight 01/18/2022    Overweight with body mass index (BMI) of 28 to 28.9 in adult    Pain in right hip     Hip pain, right    Pain in right leg     Pain of right lower extremity    Pain in unspecified hip 08/04/2021    Hip joint pain    Pain in unspecified shoulder 08/10/2021    Shoulder pain    Personal history of malignant neoplasm, unspecified 08/04/2021    History of malignant neoplasm    Personal history of other diseases of male genital organs     History of benign prostatic hyperplasia    Personal history of other diseases of the circulatory system 08/04/2021    History of hypertension    Personal history of other diseases of the circulatory system 08/04/2021    History of cardiac disorder    Personal history of other diseases of the digestive system 10/14/2021    History of gastrointestinal hemorrhage    Personal history of other diseases of the digestive system 02/26/2022    History of right inguinal hernia    Personal history of other diseases of the digestive system     History of fatty infiltration of liver    Personal history of other diseases of the musculoskeletal system and connective tissue 08/04/2021    History of arthritis    Personal history of other diseases of the musculoskeletal system and connective tissue 08/05/2021    History of rotator cuff tear    Personal history of other diseases of the respiratory system 11/17/2021    History of upper respiratory infection    Personal history of other diseases of the respiratory system     History of acute bronchitis    Personal history of other diseases of the respiratory system     History of acute pharyngitis    Personal history of other diseases of the respiratory system     History of acute sinusitis    Personal history of  other diseases of urinary system 09/01/2022    History of renal insufficiency syndrome    Personal history of other endocrine, nutritional and metabolic disease 10/06/2021    History of diabetes mellitus    Personal history of other endocrine, nutritional and metabolic disease 01/18/2022    History of hyperkalemia    Personal history of other malignant neoplasm of skin     History of other malignant neoplasm of skin    Personal history of other specified conditions 09/01/2022    History of chest pain    Personal history of other specified conditions     History of dysuria    Personal history of other specified conditions     History of chest pain    Persons encountering health services in other specified circumstances     Encounter for support and coordination of transition of care    Primary osteoarthritis, 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    DJD of left shoulder    Right lower quadrant pain 02/26/2022    Right inguinal pain    Strain of muscle, fascia and tendon of lower back, initial encounter     Lumbar strain    Strain of muscle, fascia and tendon of other parts of biceps, right arm, initial encounter 10/14/2021    Rupture of right biceps tendon, initial encounter    Trochanteric bursitis, left hip 08/10/2021    Trochanteric bursitis of left hip      Past Surgical History:   Procedure Laterality Date    CT AORTA AND BILATERAL ILIOFEMORAL RUNOFF ANGIOGRAM W AND/OR WO IV CONTRAST  10/27/2020    CT AORTA AND BILATERAL ILIOFEMORAL RUNOFF ANGIOGRAM W AND/OR WO IV CONTRAST 10/27/2020 [facility]    OTHER SURGICAL HISTORY  11/07/2022    Colonoscopy    OTHER SURGICAL HISTORY  10/14/2021    Cardiac catheterization with stent placement    OTHER SURGICAL HISTORY  10/14/2021    PTA femoral artery    OTHER SURGICAL HISTORY  10/14/2021    Rotator cuff repair    OTHER SURGICAL HISTORY  10/14/2021    Inguinal hernia repair    OTHER SURGICAL HISTORY  10/14/2021    Knee arthroscopy    OTHER SURGICAL HISTORY  11/17/2021    Skin  "biopsy    ROTATOR CUFF REPAIR        Family History   Problem Relation Name Age of Onset    Diabetes Mother Melinda andino     Coronary artery disease Father      Hypertension Other      Alzheimer's disease Other        Social History     Socioeconomic History    Marital status:      Spouse name: Not on file    Number of children: Not on file    Years of education: Not on file    Highest education level: Not on file   Occupational History    Not on file   Tobacco Use    Smoking status: Former     Current packs/day: 0.00     Average packs/day: 2.0 packs/day for 20.0 years (40.0 ttl pk-yrs)     Types: Cigarettes     Start date: 1966     Quit date: 1986     Years since quittin.6    Smokeless tobacco: Never   Substance and Sexual Activity    Alcohol use: Not Currently    Drug use: Never    Sexual activity: Not Currently     Partners: Female   Other Topics Concern    Not on file   Social History Narrative    Not on file     Social Determinants of Health     Financial Resource Strain: Not on file   Food Insecurity: Not on file   Transportation Needs: Not on file   Physical Activity: Not on file   Stress: Not on file   Social Connections: Not on file   Intimate Partner Violence: Not on file   Housing Stability: Not on file      Aluminum and [employer]el   Current Outpatient Medications   Medication Sig Dispense Refill    aspirin 81 mg EC tablet Take 1 tablet (81 mg) by mouth once daily.      BD Ultra-Fine Short Pen Needle 31 gauge x 5/16\" needle USE ONCE DAILY WITH LEVEMIR 100 each 1    calcitriol (Rocaltrol) 0.25 mcg capsule Take 1 capsule (0.25 mcg) by mouth every other day. 45 capsule 3    cyanocobalamin (Vitamin B-12) 1,000 mcg tablet Take 2 tablets (2,000 mcg) by mouth once daily.      dapagliflozin propanediol (Farxiga) 10 mg Take 1 tablet (10 mg) by mouth once daily in the morning. Take before meals. 90 tablet 1    dilTIAZem CD (Cardizem CD) 240 mg 24 hr capsule TAKE 1 CAPSULE BY MOUTH DAILY 90 capsule " 3    folic acid 0.8 mg capsule Take 1 tablet by mouth once daily at bedtime.      glimepiride (Amaryl) 4 mg tablet Take 1 tablet (4 mg) by mouth 2 times a day. 180 tablet 1    hydroCHLOROthiazide (HYDRODiuril) 25 mg tablet TAKE 1 TABLET BY MOUTH once DAILY 90 tablet 1    insulin glargine (Basaglar KwikPen U-100 Insulin) 100 unit/mL (3 mL) pen Inject 20 Units under the skin once daily at bedtime. 15 mL 1    isosorbide mononitrate ER (Imdur) 30 mg 24 hr tablet Take 1 tablet (30 mg) by mouth once daily. 90 tablet 3    lisinopril 30 mg tablet TAKE 1 TABLET BY MOUTH ONCE DAILY 90 tablet 1    nitroglycerin (Nitrostat) 0.4 mg SL tablet Place 1 tablet (0.4 mg) under the tongue every 5 minutes if needed for chest pain. 25 tablet 5    pantoprazole (ProtoNix) 40 mg EC tablet Take 1 tablet (40 mg) by mouth once daily. 90 tablet 3    simvastatin (Zocor) 20 mg tablet Take 1 tablet (20 mg) by mouth once daily at bedtime. 90 tablet 3    tamsulosin (Flomax) 0.4 mg 24 hr capsule Take 1 capsule (0.4 mg) by mouth once daily.       No current facility-administered medications for this visit.       Immunization History   Administered Date(s) Administered    Flu vaccine, quadrivalent, high-dose, preservative free, age 65y+ (FLUZONE) 10/28/2020, 11/10/2023    Flu vaccine, trivalent, preservative free, HIGH-DOSE, age 65y+ (Fluzone) 11/16/2017, 09/20/2018, 11/29/2019    Hep A / Hep B 05/08/2014, 06/16/2014, 11/12/2014    Influenza, Unspecified 10/01/2011, 10/03/2012, 10/01/2013, 10/01/2014, 10/01/2015, 10/14/2016    Pfizer Purple Cap SARS-CoV-2 02/27/2021, 03/20/2021, 01/22/2022    Pneumococcal conjugate vaccine, 13-valent (PREVNAR 13) 11/30/2016    Pneumococcal polysaccharide vaccine, 23-valent, age 2 years and older (PNEUMOVAX 23) 06/19/2011        Review of Systems   Constitutional: Negative.    HENT: Negative.     Eyes: Negative.    Respiratory: Negative.     Cardiovascular: Negative.    Gastrointestinal: Negative.    Endocrine:  Negative.    Genitourinary: Negative.    Musculoskeletal:  Positive for arthralgias.   Skin: Negative.    Allergic/Immunologic: Negative.    Hematological: Negative.    Psychiatric/Behavioral: Negative.     All other systems reviewed and are negative.       Vitals:    08/28/24 1144   BP: 101/63   Pulse: 82   Temp: 35.9 °C (96.6 °F)     Vitals:    08/28/24 1144   Weight: 76.7 kg (169 lb)      Physical Exam  Constitutional:       General: He is not in acute distress.     Appearance: Normal appearance.   Cardiovascular:      Rate and Rhythm: Normal rate and regular rhythm.      Pulses: Normal pulses.      Heart sounds: Murmur (Regular S1-S2 with a 1/6 systolic ejection murmur at the left sternal border.) heard.      No friction rub. No gallop.   Pulmonary:      Effort: Pulmonary effort is normal. No respiratory distress.      Breath sounds: Normal breath sounds. No wheezing or rales.   Neurological:      General: No focal deficit present.      Mental Status: He is alert and oriented to person, place, and time. Mental status is at baseline.     Speech is coherent but somewhat slow.    ASSESSMENT/PLAN: Annual wellness visit    Diabetes mellitus type 2 improved with A1c 7.1.  Continue to follow diabetic diet and current medications eye exams are up-to-date.  Check CMP and A1c in 4 months.    Persistent drooling slow speech and tremor.  Consideration of Parkinson's disease.  Patient is referred to  neurology for further evaluation    Bowel incontinence.  Recommended that patient begin to wear depends undergarments    Hypertension stable.    Right knee DJD.  Follow-up with orthopedics as scheduled    Chronic kidney disease followed by nephrology    Chronic anemia followed by hematology    Hyperlipidemia continue simvastatin daily    Colonoscopy up-to-date.  Will need to discuss immunizations at next office visit    Follow-up in 3 months and call as needed     Scribe Attestation  By signing my name below, Grisel YABRROUGH  HERNANDO Jensen  , [place]   attest that this documentation has been prepared under the direction and in the presence of Jimmy Helm MD.

## 2024-08-29 ASSESSMENT — ENCOUNTER SYMPTOMS
HEMATOLOGIC/LYMPHATIC NEGATIVE: 1
CARDIOVASCULAR NEGATIVE: 1
PSYCHIATRIC NEGATIVE: 1
ENDOCRINE NEGATIVE: 1
RESPIRATORY NEGATIVE: 1
CONSTITUTIONAL NEGATIVE: 1
ALLERGIC/IMMUNOLOGIC NEGATIVE: 1
GASTROINTESTINAL NEGATIVE: 1
ARTHRALGIAS: 1
EYES NEGATIVE: 1

## 2024-09-03 ENCOUNTER — INFUSION (OUTPATIENT)
Dept: HEMATOLOGY/ONCOLOGY | Facility: CLINIC | Age: 79
End: 2024-09-03
Payer: MEDICARE

## 2024-09-03 ENCOUNTER — LAB (OUTPATIENT)
Dept: LAB | Facility: CLINIC | Age: 79
End: 2024-09-03
Payer: MEDICARE

## 2024-09-03 VITALS
RESPIRATION RATE: 16 BRPM | OXYGEN SATURATION: 94 % | WEIGHT: 172.84 LBS | BODY MASS INDEX: 27.9 KG/M2 | SYSTOLIC BLOOD PRESSURE: 118 MMHG | DIASTOLIC BLOOD PRESSURE: 54 MMHG | HEART RATE: 80 BPM | TEMPERATURE: 97.7 F

## 2024-09-03 DIAGNOSIS — D46.Z MDS (MYELODYSPLASTIC SYNDROME), LOW GRADE (MULTI): ICD-10-CM

## 2024-09-03 DIAGNOSIS — D46.1 REFRACTORY ANEMIA WITH RING SIDEROBLASTS (MULTI): ICD-10-CM

## 2024-09-03 DIAGNOSIS — Z00.00 HEALTH MAINTENANCE EXAMINATION: ICD-10-CM

## 2024-09-03 DIAGNOSIS — E04.1 THYROID NODULE: ICD-10-CM

## 2024-09-03 DIAGNOSIS — I10 ESSENTIAL HYPERTENSION: ICD-10-CM

## 2024-09-03 LAB
ALBUMIN SERPL BCP-MCNC: 4.5 G/DL (ref 3.4–5)
ALP SERPL-CCNC: 63 U/L (ref 33–136)
ALT SERPL W P-5'-P-CCNC: 12 U/L (ref 10–52)
ANION GAP SERPL CALC-SCNC: 14 MMOL/L (ref 10–20)
AST SERPL W P-5'-P-CCNC: 11 U/L (ref 9–39)
BASOPHILS # BLD AUTO: 0.08 X10*3/UL (ref 0–0.1)
BASOPHILS NFR BLD AUTO: 1.6 %
BILIRUB SERPL-MCNC: 1.1 MG/DL (ref 0–1.2)
BUN SERPL-MCNC: 30 MG/DL (ref 6–23)
CALCIUM SERPL-MCNC: 9.3 MG/DL (ref 8.6–10.3)
CHLORIDE SERPL-SCNC: 104 MMOL/L (ref 98–107)
CO2 SERPL-SCNC: 24 MMOL/L (ref 21–32)
CREAT SERPL-MCNC: 1.78 MG/DL (ref 0.5–1.3)
EGFRCR SERPLBLD CKD-EPI 2021: 38 ML/MIN/1.73M*2
EOSINOPHIL # BLD AUTO: 0.42 X10*3/UL (ref 0–0.4)
EOSINOPHIL NFR BLD AUTO: 8.4 %
ERYTHROCYTE [DISTWIDTH] IN BLOOD BY AUTOMATED COUNT: 21.9 % (ref 11.5–14.5)
GIANT PLATELETS BLD QL SMEAR: NORMAL
GLUCOSE SERPL-MCNC: 228 MG/DL (ref 74–99)
HCT VFR BLD AUTO: 28.9 % (ref 41–52)
HGB BLD-MCNC: 9.7 G/DL (ref 13.5–17.5)
HYPOCHROMIA BLD QL SMEAR: NORMAL
IMM GRANULOCYTES # BLD AUTO: 0.01 X10*3/UL (ref 0–0.5)
IMM GRANULOCYTES NFR BLD AUTO: 0.2 % (ref 0–0.9)
LYMPHOCYTES # BLD AUTO: 1.45 X10*3/UL (ref 0.8–3)
LYMPHOCYTES NFR BLD AUTO: 29.1 %
MCH RBC QN AUTO: 38 PG (ref 26–34)
MCHC RBC AUTO-ENTMCNC: 33.6 G/DL (ref 32–36)
MCV RBC AUTO: 113 FL (ref 80–100)
MONOCYTES # BLD AUTO: 0.48 X10*3/UL (ref 0.05–0.8)
MONOCYTES NFR BLD AUTO: 9.6 %
NEUTROPHILS # BLD AUTO: 2.54 X10*3/UL (ref 1.6–5.5)
NEUTROPHILS NFR BLD AUTO: 51.1 %
NRBC BLD-RTO: ABNORMAL /100{WBCS}
OVALOCYTES BLD QL SMEAR: NORMAL
PLATELET # BLD AUTO: 164 X10*3/UL (ref 150–450)
POTASSIUM SERPL-SCNC: 4.4 MMOL/L (ref 3.5–5.3)
PROT SERPL-MCNC: 6.6 G/DL (ref 6.4–8.2)
RBC # BLD AUTO: 2.55 X10*6/UL (ref 4.5–5.9)
RBC MORPH BLD: NORMAL
SODIUM SERPL-SCNC: 138 MMOL/L (ref 136–145)
TSH SERPL-ACNC: 1.95 MIU/L (ref 0.44–3.98)
VIT B12 SERPL-MCNC: 1731 PG/ML (ref 211–911)
WBC # BLD AUTO: 5 X10*3/UL (ref 4.4–11.3)

## 2024-09-03 PROCEDURE — 84443 ASSAY THYROID STIM HORMONE: CPT | Performed by: FAMILY MEDICINE

## 2024-09-03 PROCEDURE — 80053 COMPREHEN METABOLIC PANEL: CPT

## 2024-09-03 PROCEDURE — 36415 COLL VENOUS BLD VENIPUNCTURE: CPT

## 2024-09-03 PROCEDURE — 82607 VITAMIN B-12: CPT | Performed by: FAMILY MEDICINE

## 2024-09-03 PROCEDURE — 96372 THER/PROPH/DIAG INJ SC/IM: CPT

## 2024-09-03 PROCEDURE — 2500000004 HC RX 250 GENERAL PHARMACY W/ HCPCS (ALT 636 FOR OP/ED): Performed by: INTERNAL MEDICINE

## 2024-09-03 PROCEDURE — 85025 COMPLETE CBC W/AUTO DIFF WBC: CPT

## 2024-09-03 RX ORDER — ALBUTEROL SULFATE 0.83 MG/ML
3 SOLUTION RESPIRATORY (INHALATION) AS NEEDED
OUTPATIENT
Start: 2024-09-23

## 2024-09-03 RX ORDER — DIPHENHYDRAMINE HYDROCHLORIDE 50 MG/ML
50 INJECTION INTRAMUSCULAR; INTRAVENOUS AS NEEDED
OUTPATIENT
Start: 2024-09-23

## 2024-09-03 RX ORDER — FAMOTIDINE 10 MG/ML
20 INJECTION INTRAVENOUS ONCE AS NEEDED
OUTPATIENT
Start: 2024-09-23

## 2024-09-03 RX ORDER — EPINEPHRINE 0.3 MG/.3ML
0.3 INJECTION SUBCUTANEOUS EVERY 5 MIN PRN
OUTPATIENT
Start: 2024-09-23

## 2024-09-04 LAB — PATH REVIEW-CBC DIFFERENTIAL: NORMAL

## 2024-09-17 DIAGNOSIS — D46.Z MDS (MYELODYSPLASTIC SYNDROME), LOW GRADE (MULTI): ICD-10-CM

## 2024-09-23 ENCOUNTER — INFUSION (OUTPATIENT)
Dept: HEMATOLOGY/ONCOLOGY | Facility: CLINIC | Age: 79
End: 2024-09-23
Payer: MEDICARE

## 2024-09-23 ENCOUNTER — LAB (OUTPATIENT)
Dept: LAB | Facility: CLINIC | Age: 79
End: 2024-09-23
Payer: MEDICARE

## 2024-09-23 ENCOUNTER — OFFICE VISIT (OUTPATIENT)
Dept: HEMATOLOGY/ONCOLOGY | Facility: CLINIC | Age: 79
End: 2024-09-23
Payer: MEDICARE

## 2024-09-23 VITALS
BODY MASS INDEX: 25.9 KG/M2 | TEMPERATURE: 97.2 F | WEIGHT: 160.5 LBS | RESPIRATION RATE: 16 BRPM | DIASTOLIC BLOOD PRESSURE: 63 MMHG | OXYGEN SATURATION: 95 % | SYSTOLIC BLOOD PRESSURE: 114 MMHG | HEART RATE: 96 BPM

## 2024-09-23 DIAGNOSIS — D46.1 REFRACTORY ANEMIA WITH RING SIDEROBLASTS (MULTI): ICD-10-CM

## 2024-09-23 DIAGNOSIS — I10 ESSENTIAL HYPERTENSION: ICD-10-CM

## 2024-09-23 DIAGNOSIS — E08.65 DIABETES MELLITUS DUE TO UNDERLYING CONDITION WITH HYPERGLYCEMIA, WITH LONG-TERM CURRENT USE OF INSULIN: ICD-10-CM

## 2024-09-23 DIAGNOSIS — I73.9 PVD (PERIPHERAL VASCULAR DISEASE) (CMS-HCC): ICD-10-CM

## 2024-09-23 DIAGNOSIS — E78.2 MIXED HYPERLIPIDEMIA: ICD-10-CM

## 2024-09-23 DIAGNOSIS — D46.Z MDS (MYELODYSPLASTIC SYNDROME), LOW GRADE (MULTI): ICD-10-CM

## 2024-09-23 DIAGNOSIS — E83.110 HEREDITARY HEMOCHROMATOSIS (CMS-HCC): ICD-10-CM

## 2024-09-23 DIAGNOSIS — D46.Z MDS (MYELODYSPLASTIC SYNDROME), LOW GRADE (MULTI): Primary | ICD-10-CM

## 2024-09-23 DIAGNOSIS — Z79.4 DIABETES MELLITUS DUE TO UNDERLYING CONDITION WITH HYPERGLYCEMIA, WITH LONG-TERM CURRENT USE OF INSULIN: ICD-10-CM

## 2024-09-23 LAB
ALBUMIN SERPL BCP-MCNC: 4.5 G/DL (ref 3.4–5)
ALP SERPL-CCNC: 65 U/L (ref 33–136)
ALT SERPL W P-5'-P-CCNC: 13 U/L (ref 10–52)
ANION GAP SERPL CALC-SCNC: 14 MMOL/L (ref 10–20)
AST SERPL W P-5'-P-CCNC: 12 U/L (ref 9–39)
BASOPHILS # BLD AUTO: 0.07 X10*3/UL (ref 0–0.1)
BASOPHILS NFR BLD AUTO: 1.2 %
BILIRUB SERPL-MCNC: 1.6 MG/DL (ref 0–1.2)
BUN SERPL-MCNC: 33 MG/DL (ref 6–23)
CALCIUM SERPL-MCNC: 9.6 MG/DL (ref 8.6–10.3)
CHLORIDE SERPL-SCNC: 105 MMOL/L (ref 98–107)
CO2 SERPL-SCNC: 24 MMOL/L (ref 21–32)
CREAT SERPL-MCNC: 1.86 MG/DL (ref 0.5–1.3)
EGFRCR SERPLBLD CKD-EPI 2021: 36 ML/MIN/1.73M*2
EOSINOPHIL # BLD AUTO: 0.51 X10*3/UL (ref 0–0.4)
EOSINOPHIL NFR BLD AUTO: 8.4 %
ERYTHROCYTE [DISTWIDTH] IN BLOOD BY AUTOMATED COUNT: 22.1 % (ref 11.5–14.5)
GLUCOSE SERPL-MCNC: 177 MG/DL (ref 74–99)
HCT VFR BLD AUTO: 29.7 % (ref 41–52)
HGB BLD-MCNC: 10 G/DL (ref 13.5–17.5)
IMM GRANULOCYTES # BLD AUTO: 0.03 X10*3/UL (ref 0–0.5)
IMM GRANULOCYTES NFR BLD AUTO: 0.5 % (ref 0–0.9)
LYMPHOCYTES # BLD AUTO: 1.45 X10*3/UL (ref 0.8–3)
LYMPHOCYTES NFR BLD AUTO: 23.8 %
MCH RBC QN AUTO: 37.9 PG (ref 26–34)
MCHC RBC AUTO-ENTMCNC: 33.7 G/DL (ref 32–36)
MCV RBC AUTO: 113 FL (ref 80–100)
MONOCYTES # BLD AUTO: 0.65 X10*3/UL (ref 0.05–0.8)
MONOCYTES NFR BLD AUTO: 10.7 %
NEUTROPHILS # BLD AUTO: 3.37 X10*3/UL (ref 1.6–5.5)
NEUTROPHILS NFR BLD AUTO: 55.4 %
NRBC BLD-RTO: ABNORMAL /100{WBCS}
PLATELET # BLD AUTO: 176 X10*3/UL (ref 150–450)
POTASSIUM SERPL-SCNC: 4.1 MMOL/L (ref 3.5–5.3)
PROT SERPL-MCNC: 7 G/DL (ref 6.4–8.2)
RBC # BLD AUTO: 2.64 X10*6/UL (ref 4.5–5.9)
SODIUM SERPL-SCNC: 139 MMOL/L (ref 136–145)
WBC # BLD AUTO: 6.1 X10*3/UL (ref 4.4–11.3)

## 2024-09-23 PROCEDURE — 36415 COLL VENOUS BLD VENIPUNCTURE: CPT

## 2024-09-23 PROCEDURE — 3078F DIAST BP <80 MM HG: CPT | Performed by: INTERNAL MEDICINE

## 2024-09-23 PROCEDURE — 1123F ACP DISCUSS/DSCN MKR DOCD: CPT | Performed by: INTERNAL MEDICINE

## 2024-09-23 PROCEDURE — 99214 OFFICE O/P EST MOD 30 MIN: CPT | Mod: 25 | Performed by: INTERNAL MEDICINE

## 2024-09-23 PROCEDURE — 80053 COMPREHEN METABOLIC PANEL: CPT

## 2024-09-23 PROCEDURE — 99214 OFFICE O/P EST MOD 30 MIN: CPT | Performed by: INTERNAL MEDICINE

## 2024-09-23 PROCEDURE — 1125F AMNT PAIN NOTED PAIN PRSNT: CPT | Performed by: INTERNAL MEDICINE

## 2024-09-23 PROCEDURE — 96372 THER/PROPH/DIAG INJ SC/IM: CPT

## 2024-09-23 PROCEDURE — 2500000004 HC RX 250 GENERAL PHARMACY W/ HCPCS (ALT 636 FOR OP/ED): Mod: JZ | Performed by: INTERNAL MEDICINE

## 2024-09-23 PROCEDURE — 3074F SYST BP LT 130 MM HG: CPT | Performed by: INTERNAL MEDICINE

## 2024-09-23 PROCEDURE — 1159F MED LIST DOCD IN RCRD: CPT | Performed by: INTERNAL MEDICINE

## 2024-09-23 PROCEDURE — 85025 COMPLETE CBC W/AUTO DIFF WBC: CPT

## 2024-09-23 RX ORDER — EPINEPHRINE 0.3 MG/.3ML
0.3 INJECTION SUBCUTANEOUS EVERY 5 MIN PRN
Status: CANCELLED | OUTPATIENT
Start: 2024-09-24

## 2024-09-23 RX ORDER — FAMOTIDINE 10 MG/ML
20 INJECTION INTRAVENOUS ONCE AS NEEDED
OUTPATIENT
Start: 2024-09-24

## 2024-09-23 RX ORDER — EPINEPHRINE 0.3 MG/.3ML
0.3 INJECTION SUBCUTANEOUS EVERY 5 MIN PRN
OUTPATIENT
Start: 2024-09-24

## 2024-09-23 RX ORDER — ALBUTEROL SULFATE 0.83 MG/ML
3 SOLUTION RESPIRATORY (INHALATION) AS NEEDED
Status: DISCONTINUED | OUTPATIENT
Start: 2024-09-23 | End: 2024-09-23 | Stop reason: HOSPADM

## 2024-09-23 RX ORDER — DIPHENHYDRAMINE HYDROCHLORIDE 50 MG/ML
50 INJECTION INTRAMUSCULAR; INTRAVENOUS AS NEEDED
Status: CANCELLED | OUTPATIENT
Start: 2024-09-24

## 2024-09-23 RX ORDER — ALBUTEROL SULFATE 0.83 MG/ML
3 SOLUTION RESPIRATORY (INHALATION) AS NEEDED
OUTPATIENT
Start: 2024-09-24

## 2024-09-23 RX ORDER — DIPHENHYDRAMINE HYDROCHLORIDE 50 MG/ML
50 INJECTION INTRAMUSCULAR; INTRAVENOUS AS NEEDED
Status: DISCONTINUED | OUTPATIENT
Start: 2024-09-23 | End: 2024-09-23 | Stop reason: HOSPADM

## 2024-09-23 RX ORDER — FAMOTIDINE 10 MG/ML
20 INJECTION INTRAVENOUS ONCE AS NEEDED
Status: CANCELLED | OUTPATIENT
Start: 2024-09-24

## 2024-09-23 RX ORDER — DIPHENHYDRAMINE HYDROCHLORIDE 50 MG/ML
50 INJECTION INTRAMUSCULAR; INTRAVENOUS AS NEEDED
OUTPATIENT
Start: 2024-09-24

## 2024-09-23 RX ORDER — EPINEPHRINE 0.3 MG/.3ML
0.3 INJECTION SUBCUTANEOUS EVERY 5 MIN PRN
Status: DISCONTINUED | OUTPATIENT
Start: 2024-09-23 | End: 2024-09-23 | Stop reason: HOSPADM

## 2024-09-23 RX ORDER — FAMOTIDINE 10 MG/ML
20 INJECTION INTRAVENOUS ONCE AS NEEDED
Status: DISCONTINUED | OUTPATIENT
Start: 2024-09-23 | End: 2024-09-23 | Stop reason: HOSPADM

## 2024-09-23 RX ORDER — ALBUTEROL SULFATE 0.83 MG/ML
3 SOLUTION RESPIRATORY (INHALATION) AS NEEDED
Status: CANCELLED | OUTPATIENT
Start: 2024-09-24

## 2024-09-23 ASSESSMENT — PAIN SCALES - GENERAL: PAINLEVEL: 2

## 2024-09-23 NOTE — PATIENT INSTRUCTIONS
Hgb-10 today. Pt received luspatercept injection without incident. Will RTC in 3 weeks and will receive a higher dose at that time.

## 2024-09-24 ENCOUNTER — HOSPITAL ENCOUNTER (OUTPATIENT)
Dept: RADIOLOGY | Facility: CLINIC | Age: 79
Discharge: HOME | End: 2024-09-24
Payer: MEDICARE

## 2024-09-24 ENCOUNTER — OFFICE VISIT (OUTPATIENT)
Dept: ORTHOPEDIC SURGERY | Facility: CLINIC | Age: 79
End: 2024-09-24
Payer: MEDICARE

## 2024-09-24 DIAGNOSIS — M17.12 PRIMARY OSTEOARTHRITIS OF LEFT KNEE: ICD-10-CM

## 2024-09-24 DIAGNOSIS — M25.562 LEFT KNEE PAIN, UNSPECIFIED CHRONICITY: ICD-10-CM

## 2024-09-24 PROCEDURE — 1159F MED LIST DOCD IN RCRD: CPT | Performed by: ORTHOPAEDIC SURGERY

## 2024-09-24 PROCEDURE — 1123F ACP DISCUSS/DSCN MKR DOCD: CPT | Performed by: ORTHOPAEDIC SURGERY

## 2024-09-24 PROCEDURE — 1036F TOBACCO NON-USER: CPT | Performed by: ORTHOPAEDIC SURGERY

## 2024-09-24 PROCEDURE — 1160F RVW MEDS BY RX/DR IN RCRD: CPT | Performed by: ORTHOPAEDIC SURGERY

## 2024-09-24 PROCEDURE — 2500000005 HC RX 250 GENERAL PHARMACY W/O HCPCS: Performed by: ORTHOPAEDIC SURGERY

## 2024-09-24 PROCEDURE — 73564 X-RAY EXAM KNEE 4 OR MORE: CPT | Mod: LT

## 2024-09-24 PROCEDURE — 2500000004 HC RX 250 GENERAL PHARMACY W/ HCPCS (ALT 636 FOR OP/ED): Performed by: ORTHOPAEDIC SURGERY

## 2024-09-24 PROCEDURE — 73564 X-RAY EXAM KNEE 4 OR MORE: CPT | Mod: LEFT SIDE | Performed by: ORTHOPAEDIC SURGERY

## 2024-09-24 PROCEDURE — 99213 OFFICE O/P EST LOW 20 MIN: CPT | Mod: 25 | Performed by: ORTHOPAEDIC SURGERY

## 2024-09-24 PROCEDURE — 20610 DRAIN/INJ JOINT/BURSA W/O US: CPT | Mod: LT | Performed by: ORTHOPAEDIC SURGERY

## 2024-09-24 PROCEDURE — 99214 OFFICE O/P EST MOD 30 MIN: CPT | Performed by: ORTHOPAEDIC SURGERY

## 2024-09-24 RX ORDER — BETAMETHASONE SODIUM PHOSPHATE AND BETAMETHASONE ACETATE 3; 3 MG/ML; MG/ML
2 INJECTION, SUSPENSION INTRA-ARTICULAR; INTRALESIONAL; INTRAMUSCULAR; SOFT TISSUE
Status: COMPLETED | OUTPATIENT
Start: 2024-09-24 | End: 2024-09-24

## 2024-09-24 RX ORDER — LIDOCAINE HYDROCHLORIDE 10 MG/ML
4 INJECTION, SOLUTION INFILTRATION; PERINEURAL
Status: COMPLETED | OUTPATIENT
Start: 2024-09-24 | End: 2024-09-24

## 2024-09-24 NOTE — PROGRESS NOTES
New patient to me 79-year-old gentleman presents for evaluation of some left-sided knee pain he states he knows he has significant knee arthritis he had problems with his right knee in the past which responded well to gel injection the left knee is giving him pain along the medial side of his knee difficulty bearing weight difficulty stand    For prolonged period time he is also had some soreness in his hip which he is seeing Dr. Ramirez    Location of pain: Anterior and medial aspect knee left  Quality of pain: Chronic pain for many years worse over the last 6  Modifying factors: Worse when he gets up and down from seated position or weightbears for prolonged period time better with rest  Associated signs and symptoms: Some swelling and stiffness no locking or giving way some numbness and tingling into his foot  Previous treatment: History of gel injections on the right side with excellent relief        The patient's past medical history, family history, social history, and review of systems were documented on the patient's medical intake form.  The medical intake form was reviewed and scanned into the electronic medical record for future use.  History is otherwise negative except as stated in the HPI.    Physical exam    General: Alert and oriented to place, person, and time.  No acute distress and breathing comfortably; pleasant and cooperative with the examination.  HEENT: Head is normocephalic and atraumatic.  Neck: Supple, no visible swelling.  Cardiovascular: Good perfusion to the affected extremity.  Lungs: No audible wheezing or labored breathing.  Abdomen: Nondistended  HEME/Lymph : No visible abnormalities bilateral lower extremity    Extremity:  The affected knee was examined and inspected and was tender to touch along the medial aspect.  The patient has catching/locking and occasional mechanical symptoms.  The skin was intact without breakdown or open wounds.  There was a mild Naomi exam seen with  mild evidence of instability and weakness in the collateral ligaments with laxity to varus valgus stress and in the anterior posterior plane.  There was a negative Lachman's test, pivot shift test, and posterior drawer sign.  There was no foot drop, numbness or tingling.  Sensation, reflexes, and pulses in the foot and ankle were present.  There was an effusion but range of motion was good and straight leg raise testing was normal.   The patient had the ability to bear weight but with discomfort.  The patient's gait was antalgic secondary to the discomfort. Knee range of motion was 5-115 with significant varus deformity    Diagnostics:      XR knee left 4+ views    Result Date: 9/24/2024  Interpreted By:  Blanka Colindres, STUDY: XR KNEE LEFT 4+ VIEWS; 9/24/2024 9:06 am   INDICATION: Signs/Symptoms:pain.   ACCESSION NUMBER(S): [id number]   ORDERING CLINICIAN: BLANKA COLINDRES   FINDINGS: Left knee weightbearing four views. Severe knee arthritis is varus deformity bone-on-bone articulation medial joint space no evidence of fracture dislocation or other bony abnormality     Signed by: Blanka Colindres 9/24/2024 11:10 AM Dictation workstation:   [id number]         Procedures  See dictated procedure note    Assessment:  Left knee arthritis    Treatment plan:  1.  The natural history of the condition and its associated treatment alternatives including surgical and nonsurgical options were discussed with the patient at length.  2.  Patient would like try cortisone injection left knee which is performed today with his consent without complication.  Patient understands the cortisone may provide temporary relief how much it helps her how long it lasts is unpredictable.  Cortisone can be repeated after 3 months if symptoms return.  He is also interested in submitting for authorization for viscosupplementation as he had good result from previous gel injection on the right knee.  3.  In conclusion, this patient has  osteoarthritis of the knee which is symptomatic.  This is causing significant morning stiffness lasting over an hour.  The patient has popping clicking and grinding in the knee with range of motion that is decreased and gets worse with prolonged standing or going up and down stairs.  This affects functional activities and activities of daily living.  There is radiographic evidence of osteoarthritis with joint space narrowing and marginal osteophyte formation.  This patient has also failed nonpharmacologic treatment for osteoarthritis including attempts at weight loss, and a home exercise program with or without physical therapy.  The patient has also failed pharmacologic treatments for osteoarthritis including over-the-counter analgesics, anti-inflammatory medication as well as injectable treatments.  For these reasons I feel the patient is a candidate for Visco supplementation injections of the knee.  4.  All of the patient's questions were answered.    Orders Placed This Encounter    Inj/Asp: Left knee    XR knee left 4+ views       This note was prepared using voice recognition software.  The details of this note are correct and have been reviewed, and corrected to the best of my ability.  Some grammatical areas may persist related to the Dragon software    Ananda Colindres MD  Senior Attending Physician  [facility]  [facility] [place]    [phone]    Inj/Asp: Left knee on 9/24/2024 9:28 AM  Indications: pain and diagnostic evaluation  Details: 22 G needle, anteromedial approach  Medications: 2 mL betamethasone acet,sod phos 6 mg/mL; 4 mL lidocaine 10 mg/mL (1 %)  Outcome: tolerated well, no immediate complications  Procedure, treatment alternatives, risks and benefits explained, specific risks discussed. Consent was given by the patient. Immediately prior to procedure a time out was called to verify the correct patient, procedure, equipment, support staff and site/side marked as required.  Patient was prepped and draped in the usual sterile fashion.

## 2024-09-26 PROBLEM — E83.110 HEREDITARY HEMOCHROMATOSIS (CMS-HCC): Status: ACTIVE | Noted: 2024-09-26

## 2024-09-26 ASSESSMENT — ENCOUNTER SYMPTOMS
CONSTITUTIONAL NEGATIVE: 1
MUSCULOSKELETAL NEGATIVE: 1
PSYCHIATRIC NEGATIVE: 1
EYES NEGATIVE: 1
HEMATOLOGIC/LYMPHATIC NEGATIVE: 1
CARDIOVASCULAR NEGATIVE: 1
NEUROLOGICAL NEGATIVE: 1
ENDOCRINE NEGATIVE: 1
GASTROINTESTINAL NEGATIVE: 1
RESPIRATORY NEGATIVE: 1

## 2024-09-26 NOTE — PROGRESS NOTES
Patient ID: Chas Melgar is a 79 y.o. male.  Referring Physician: Sav White, APRN-CNP  [id number] [place] [address]  Primary Care Provider: Jimmy Helm MD  Visit Type: Follow Up      Subjective    HPI How are my blood counts?    Review of Systems   Constitutional: Negative.    HENT:  Negative.     Eyes: Negative.    Respiratory: Negative.     Cardiovascular: Negative.    Gastrointestinal: Negative.    Endocrine: Negative.    Genitourinary: Negative.     Musculoskeletal: Negative.    Skin: Negative.    Neurological: Negative.    Hematological: Negative.    Psychiatric/Behavioral: Negative.          Objective   BSA: 1.84 meters squared  /63 (BP Location: Right arm)   Pulse 96   Temp 36.2 °C (97.2 °F) (Temporal)   Resp 16   Wt 72.8 kg (160 lb 7.9 oz)   SpO2 95%   BMI 25.90 kg/m²      has a past medical history of Acute gastric ulcer with hemorrhage (09/07/2022), Acute upper respiratory infection, unspecified, Arthritis, Body mass index (BMI) 28.0-28.9, adult (10/19/2021), Cancer (Multi), Coronary artery disease, Diabetes mellitus (Multi), Encounter for other preprocedural examination (10/14/2021), Encounter for screening for malignant neoplasm of prostate, Impingement syndrome of unspecified shoulder (07/22/2021), Other abnormalities of breathing, 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[facility]), Other specified follicular disorders, Other specified postprocedural states (06/09/2021), Overweight (02/21/2022), Overweight (01/18/2022), Pain in right hip, Pain in right leg, Pain in unspecified hip (08/04/2021), Pain in unspecified shoulder (08/10/2021), Personal history of malignant neoplasm, unspecified (08/04/2021), Personal history of other diseases of male genital organs, Personal history of other diseases of the circulatory system (08/04/2021), Personal history of other diseases of the circulatory system (08/04/2021), Personal history of  other diseases of the digestive system (10/14/2021), Personal history of other diseases of the digestive system (02/26/2022), Personal history of other diseases of the digestive system, Personal history of other diseases of the musculoskeletal system and connective tissue (08/04/2021), Personal history of other diseases of the musculoskeletal system and connective tissue (08/05/2021), Personal history of other diseases of the respiratory system (11/17/2021), Personal history of other diseases of the respiratory system, Personal history of other diseases of the respiratory system, Personal history of other diseases of the respiratory system, Personal history of other diseases of urinary system (09/01/2022), Personal history of other endocrine, nutritional and metabolic disease (10/06/2021), Personal history of other endocrine, nutritional and metabolic disease (01/18/2022), Personal history of other malignant neoplasm of skin, Personal history of other specified conditions (09/01/2022), Personal history of other specified conditions, Personal history of other specified conditions, Persons encountering health services in other specified circumstances, Primary osteoarthritis, 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   has a past surgical history that includes Other surgical history (11/07/2022); Other surgical history (10/14/2021); Other surgical history (10/14/2021); Other surgical history (10/14/2021); Other surgical history (10/14/2021); Other surgical history (10/14/2021); Other surgical history (11/17/2021); CT angio aorta and bilateral iliofemoral runoff w and or wo IV contrast (10/27/2020); and Rotator cuff repair.  Family History   Problem Relation Name Age of  Onset    Diabetes Mother Melinda melgar     Coronary artery disease Father      Hypertension Other      Alzheimer's disease Other       Oncology History    No history exists.       Chas Melgar  reports that he quit smoking about 38 years ago. His smoking use included cigarettes. He started smoking about 58 years ago. He has a 40 pack-year smoking history. He has never used smokeless tobacco.  He  reports that he does not currently use alcohol.  He  reports no history of drug use.    Physical Exam  Vitals reviewed.   Constitutional:       Appearance: Normal appearance.   HENT:      Head: Normocephalic.      Mouth/Throat:      Mouth: Mucous membranes are moist.   Eyes:      Extraocular Movements: Extraocular movements intact.      Pupils: Pupils are equal, round, and reactive to light.   Cardiovascular:      Rate and Rhythm: Normal rate and regular rhythm.   Pulmonary:      Effort: Pulmonary effort is normal.      Breath sounds: Normal breath sounds.   Abdominal:      General: Bowel sounds are normal.      Palpations: Abdomen is soft.   Musculoskeletal:         General: Normal range of motion.      Cervical back: Normal range of motion and neck supple.   Skin:     General: Skin is warm.   Neurological:      General: No focal deficit present.      Mental Status: He is alert and oriented to person, place, and time.   Psychiatric:         Mood and Affect: Mood normal.         Behavior: Behavior normal.         WBC   Date/Time Value Ref Range Status   09/23/2024 10:57 AM 6.1 4.4 - 11.3 x10*3/uL Final   09/03/2024 11:37 AM 5.0 4.4 - 11.3 x10*3/uL Final   08/21/2024 10:17 AM 10.1 4.4 - 11.3 x10*3/uL Final     nRBC   Date Value Ref Range Status   09/23/2024   Final     Comment:     Not Measured   09/03/2024   Final     Comment:     Not Measured   08/21/2024 0.3 (H) 0.0 - 0.0 /100 WBCs Final     RBC   Date Value Ref Range Status   09/23/2024 2.64 (L) 4.50 - 5.90 x10*6/uL Final   09/03/2024 2.55 (L) 4.50 - 5.90  x10*6/uL Final   08/21/2024 2.79 (L) 4.50 - 5.90 x10*6/uL Final     Hemoglobin   Date Value Ref Range Status   09/23/2024 10.0 (L) 13.5 - 17.5 g/dL Final   09/03/2024 9.7 (L) 13.5 - 17.5 g/dL Final   08/21/2024 10.3 (L) 13.5 - 17.5 g/dL Final     Hematocrit   Date Value Ref Range Status   09/23/2024 29.7 (L) 41.0 - 52.0 % Final   09/03/2024 28.9 (L) 41.0 - 52.0 % Final   08/21/2024 31.1 (L) 41.0 - 52.0 % Final     MCV   Date/Time Value Ref Range Status   09/23/2024 10:57  (H) 80 - 100 fL Final   09/03/2024 11:37  (H) 80 - 100 fL Final   08/21/2024 10:17  (H) 80 - 100 fL Final     MCH   Date/Time Value Ref Range Status   09/23/2024 10:57 AM 37.9 (H) 26.0 - 34.0 pg Final   09/03/2024 11:37 AM 38.0 (H) 26.0 - 34.0 pg Final   08/21/2024 10:17 AM 36.9 (H) 26.0 - 34.0 pg Final     MCHC   Date/Time Value Ref Range Status   09/23/2024 10:57 AM 33.7 32.0 - 36.0 g/dL Final   09/03/2024 11:37 AM 33.6 32.0 - 36.0 g/dL Final   08/21/2024 10:17 AM 33.1 32.0 - 36.0 g/dL Final     RDW   Date/Time Value Ref Range Status   09/23/2024 10:57 AM 22.1 (H) 11.5 - 14.5 % Final   09/03/2024 11:37 AM 21.9 (H) 11.5 - 14.5 % Final   08/21/2024 10:17 AM 22.5 (H) 11.5 - 14.5 % Final     Platelets   Date/Time Value Ref Range Status   09/23/2024 10:57  150 - 450 x10*3/uL Final   09/03/2024 11:37  150 - 450 x10*3/uL Final   08/21/2024 10:17  150 - 450 x10*3/uL Final     MPV   Date/Time Value Ref Range Status   10/23/2023 01:06 PM 14.6 (H) 7.5 - 11.5 fL Final   10/02/2023 12:34 PM 14.5 (H) 7.5 - 11.5 fL Final     Neutrophils %   Date/Time Value Ref Range Status   09/23/2024 10:57 AM 55.4 40.0 - 80.0 % Final   09/03/2024 11:37 AM 51.1 40.0 - 80.0 % Final   08/21/2024 10:17 AM 67.6 40.0 - 80.0 % Final     Immature Granulocytes %, Automated   Date/Time Value Ref Range Status   09/23/2024 10:57 AM 0.5 0.0 - 0.9 % Final     Comment:     Immature Granulocyte Count (IG) includes promyelocytes, myelocytes and  metamyelocytes but does not include bands. Percent differential counts (%) should be interpreted in the context of the absolute cell counts (cells/UL).   09/03/2024 11:37 AM 0.2 0.0 - 0.9 % Final     Comment:     Immature Granulocyte Count (IG) includes promyelocytes, myelocytes and metamyelocytes but does not include bands. Percent differential counts (%) should be interpreted in the context of the absolute cell counts (cells/UL).   08/21/2024 10:17 AM 0.5 0.0 - 0.9 % Final     Comment:     Immature Granulocyte Count (IG) includes promyelocytes, myelocytes and metamyelocytes but does not include bands. Percent differential counts (%) should be interpreted in the context of the absolute cell counts (cells/UL).     Lymphocytes %   Date/Time Value Ref Range Status   09/23/2024 10:57 AM 23.8 13.0 - 44.0 % Final   09/03/2024 11:37 AM 29.1 13.0 - 44.0 % Final   08/21/2024 10:17 AM 15.9 13.0 - 44.0 % Final     Monocytes %   Date/Time Value Ref Range Status   09/23/2024 10:57 AM 10.7 2.0 - 10.0 % Final   09/03/2024 11:37 AM 9.6 2.0 - 10.0 % Final   08/21/2024 10:17 AM 8.3 2.0 - 10.0 % Final     Eosinophils %   Date/Time Value Ref Range Status   09/23/2024 10:57 AM 8.4 0.0 - 6.0 % Final   09/03/2024 11:37 AM 8.4 0.0 - 6.0 % Final   08/21/2024 10:17 AM 6.8 0.0 - 6.0 % Final     Basophils %   Date/Time Value Ref Range Status   09/23/2024 10:57 AM 1.2 0.0 - 2.0 % Final   09/03/2024 11:37 AM 1.6 0.0 - 2.0 % Final   08/21/2024 10:17 AM 0.9 0.0 - 2.0 % Final     Neutrophils Absolute   Date/Time Value Ref Range Status   09/23/2024 10:57 AM 3.37 1.60 - 5.50 x10*3/uL Final     Comment:     Percent differential counts (%) should be interpreted in the context of the absolute cell counts (cells/uL).   09/03/2024 11:37 AM 2.54 1.60 - 5.50 x10*3/uL Final     Comment:     Percent differential counts (%) should be interpreted in the context of the absolute cell counts (cells/uL).   08/21/2024 10:17 AM 6.80 (H) 1.60 - 5.50 x10*3/uL Final     " Comment:     Percent differential counts (%) should be interpreted in the context of the absolute cell counts (cells/uL).     Immature Granulocytes Absolute, Automated   Date/Time Value Ref Range Status   09/23/2024 10:57 AM 0.03 0.00 - 0.50 x10*3/uL Final   09/03/2024 11:37 AM 0.01 0.00 - 0.50 x10*3/uL Final   08/21/2024 10:17 AM 0.05 0.00 - 0.50 x10*3/uL Final     Lymphocytes Absolute   Date/Time Value Ref Range Status   09/23/2024 10:57 AM 1.45 0.80 - 3.00 x10*3/uL Final   09/03/2024 11:37 AM 1.45 0.80 - 3.00 x10*3/uL Final   08/21/2024 10:17 AM 1.60 0.80 - 3.00 x10*3/uL Final     Monocytes Absolute   Date/Time Value Ref Range Status   09/23/2024 10:57 AM 0.65 0.05 - 0.80 x10*3/uL Final   09/03/2024 11:37 AM 0.48 0.05 - 0.80 x10*3/uL Final   08/21/2024 10:17 AM 0.84 (H) 0.05 - 0.80 x10*3/uL Final     Eosinophils Absolute   Date/Time Value Ref Range Status   09/23/2024 10:57 AM 0.51 (H) 0.00 - 0.40 x10*3/uL Final   09/03/2024 11:37 AM 0.42 (H) 0.00 - 0.40 x10*3/uL Final   08/21/2024 10:17 AM 0.68 (H) 0.00 - 0.40 x10*3/uL Final     Basophils Absolute   Date/Time Value Ref Range Status   09/23/2024 10:57 AM 0.07 0.00 - 0.10 x10*3/uL Final   09/03/2024 11:37 AM 0.08 0.00 - 0.10 x10*3/uL Final     Comment:     Automated WBC differential has been confirmed by manual smear.   08/21/2024 10:17 AM 0.09 0.00 - 0.10 x10*3/uL Final       No components found for: \"PT\"  aPTT   Date/Time Value Ref Range Status   11/21/2023 08:20 AM 31 27 - 38 seconds Final   08/22/2022 01:05 PM 30 26 - 39 sec Final     Comment:       THE APTT IS NO LONGER USED FOR MONITORING     UNFRACTIONATED HEPARIN THERAPY.    FOR MONITORING HEPARIN THERAPY,     USE THE HEPARIN ASSAY.     08/21/2021 07:08 AM 24 (L) 25 - 35 sec Final     Comment:       THE APTT IS NO LONGER USED FOR MONITORING     UNFRACTIONATED HEPARIN THERAPY.    FOR MONITORING HEPARIN THERAPY,     USE THE HEPARIN ASSAY.     04/29/2021 08:43 AM 28 25 - 35 sec Final     Comment:       THE " "APTT IS NO LONGER USED FOR MONITORING     UNFRACTIONATED HEPARIN THERAPY.    FOR MONITORING HEPARIN THERAPY,     USE THE HEPARIN ASSAY.       Medication Documentation Review Audit       Reviewed by Ananda Colindres MD (Physician) on 09/24/24 at 1226      Medication Order Taking? Sig Documenting Provider Last Dose Status      Discontinued 09/23/24 1508   aspirin 81 mg EC tablet [id number] No Take 1 tablet (81 mg) by mouth once daily. Historical Provider, MD Taking Active   BD Ultra-Fine Short Pen Needle 31 gauge x 5/16\" needle [id number] No USE ONCE DAILY WITH LEVAMITAIR Jimmy Helm MD Taking Active   calcitriol (Rocaltrol) 0.25 mcg capsule [id number] No Take 1 capsule (0.25 mcg) by mouth every other day. Siddhartha Burnett MD Taking Active   cyanocobalamin (Vitamin B-12) 1,000 mcg tablet [id number] No Take 2 tablets (2,000 mcg) by mouth once daily. Historical Provider, MD Taking Active   dapagliflozin propanediol (Farxiga) 10 mg [id number] No Take 1 tablet (10 mg) by mouth once daily in the morning. Take before meals. Siddhartha Burnett MD Taking Active      Discontinued 09/23/24 1508   dilTIAZem CD (Cardizem CD) 240 mg 24 hr capsule [id number] No TAKE 1 CAPSULE BY MOUTH DAILY Wilfrido Goodwin MD Taking Active      Discontinued 09/23/24 1508      Discontinued 09/23/24 1508      Discontinued 09/23/24 1508   folic acid 0.8 mg capsule [id number] No Take 1 tablet by mouth once daily at bedtime. Historical Provider, MD Taking Active   glimepiride (Amaryl) 4 mg tablet [id number] No Take 1 tablet (4 mg) by mouth 2 times a day. Jimmy Helm MD Taking Active   hydroCHLOROthiazide (HYDRODiuril) 25 mg tablet [id number] No TAKE 1 TABLET BY MOUTH once DAILY Jimmy Helm MD Taking Active   insulin glargine (Basaglar KwikPen U-100 Insulin) 100 unit/mL (3 mL) pen [id number] No Inject 20 Units under the skin once daily at bedtime. Jimmy Helm MD Taking Active   isosorbide mononitrate ER (Imdur) 30 mg 24 hr tablet [id number] No Take 1 " tablet (30 mg) by mouth once daily. Wilfrido Goodwin MD Taking Active   lisinopril 30 mg tablet [id number] No TAKE 1 TABLET BY MOUTH ONCE DAILY Jimmy Helm MD Taking Active      Discontinued 09/23/24 1508   nitroglycerin (Nitrostat) 0.4 mg SL tablet [id number] No Place 1 tablet (0.4 mg) under the tongue every 5 minutes if needed for chest pain. Wilfrido Goodwin MD Taking Active   pantoprazole (ProtoNix) 40 mg EC tablet [id number] No Take 1 tablet (40 mg) by mouth once daily. Dayanna Monroe MD Taking Active   simvastatin (Zocor) 20 mg tablet [id number] No Take 1 tablet (20 mg) by mouth once daily at bedtime. Wilfrido Goodwin MD Taking Active      Discontinued 09/23/24 1508   tamsulosin (Flomax) 0.4 mg 24 hr capsule [id number] No Take 1 capsule (0.4 mg) by mouth once daily. Historical Provider, MD Taking Active                   Assessment/Plan    1) MDS/RARS  -has symptomatic anemia  -failed aranesp  -currently on luspatercept Q3 weeks  -currently being dosed at 1.33 mg/kg  -reports exercise tolerance a bit better over the last couple weeks  -labs to be done today include CBC, COMP  -results reviewed--wbc 6.1, hgb 10 plt 176,000, creatinine 1.86, calcium 9.6, alk phos 65, AST 12, total bili 1.6, ALT 13  -benefits, risks, potential morbidity related to luspatercept were reviewed with Chas and he provided informed consent to prceed  -he went on to receive luspatercept 1.33 mg/kg subcutaneous  -he will return in 3 weeks for next dose (at that time, will increase dose to next dose level, 1.75 mg/kg)     2) hereditary hemochromatosis  -had been on therapeutic phlebotomy in the past, however, given current marked anemia, it has not been wise to continue with phlebotomy     3) hyperlipidemia  -on simvastatin     4) hypertension  -on hydrochlorothiazide  -on lisinopril  -on Cartia XT     5) diabetes  -on glimepiride  -on metformin  -on levemir     6) PAD  -on ASA  -s/p multiple stents        Problem List Items Addressed This  Visit             ICD-10-CM    MDS (myelodysplastic syndrome), low grade (Multi) - Primary D46.Z    Relevant Orders    Infusion Appointment Request [facility]    Clinic Appointment Request Chemo Follow Up; [facility]; [place] [phone]    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

## 2024-10-04 DIAGNOSIS — N18.32 STAGE 3B CHRONIC KIDNEY DISEASE (MULTI): ICD-10-CM

## 2024-10-06 RX ORDER — DAPAGLIFLOZIN 10 MG/1
10 TABLET, FILM COATED ORAL
Qty: 90 TABLET | Refills: 3 | Status: SHIPPED | OUTPATIENT
Start: 2024-10-06

## 2024-10-07 DIAGNOSIS — I25.10 CORONARY ARTERY DISEASE INVOLVING NATIVE CORONARY ARTERY OF NATIVE HEART, UNSPECIFIED WHETHER ANGINA PRESENT: ICD-10-CM

## 2024-10-07 DIAGNOSIS — I10 ESSENTIAL HYPERTENSION: ICD-10-CM

## 2024-10-07 DIAGNOSIS — Z95.1 S/P CABG X 5: ICD-10-CM

## 2024-10-07 DIAGNOSIS — I20.9 ANGINA, CLASS II (CMS-HCC): ICD-10-CM

## 2024-10-07 DIAGNOSIS — E78.2 MIXED HYPERLIPIDEMIA: ICD-10-CM

## 2024-10-08 RX ORDER — DILTIAZEM HYDROCHLORIDE 240 MG/1
240 CAPSULE, COATED, EXTENDED RELEASE ORAL DAILY
Qty: 270 CAPSULE | Refills: 0 | Status: SHIPPED | OUTPATIENT
Start: 2024-10-08

## 2024-10-08 RX ORDER — ISOSORBIDE MONONITRATE 30 MG/1
30 TABLET, EXTENDED RELEASE ORAL DAILY
Qty: 270 TABLET | Refills: 0 | Status: SHIPPED | OUTPATIENT
Start: 2024-10-08

## 2024-10-08 RX ORDER — SIMVASTATIN 20 MG/1
20 TABLET, FILM COATED ORAL NIGHTLY
Qty: 180 TABLET | Refills: 0 | Status: SHIPPED | OUTPATIENT
Start: 2024-10-08

## 2024-10-08 NOTE — TELEPHONE ENCOUNTER
Received request for prescription refills for patient.   Patient follows with Dr. Goodwin     Request is for Simvastatin 20 mg, Diltiazem 240 mg, Isosorbide mononitrate 30 mg   Is patient currently on medication yes     Last OV 1/25/24  Next OV 10/24/24    Pended for signing and sent to provider

## 2024-10-09 NOTE — PROGRESS NOTES
"  Physical Therapy  Physical Therapy Evaluation    Patient Name: Chas Melgar \"Javid\"  MRN: [id number]  Today's Date: 10/11/2024  Time Calculation  Start Time: 0158  Stop Time: 0232  Time Calculation (min): 34 min  PT Evaluation Time Entry  PT Evaluation (Low) Time Entry: 26  PT Therapeutic Procedures Time Entry  Therapeutic Exercise Time Entry: 8                   Insurance:  COPAY 0 (MET)  Visit number: 1 of 9  Authorization info: No Auth Req  Insurance Type: MEDICARE/ [id number] ($0 USED )      General:  Reason for visit: LLE weakness  Referred by: Jimmy Helm MD     Current Problem:  R26.81 Unsteady gait  R53.1 Weakness  W19.XXXS Fall, sequela    Precautions: per MR         Medical History Form: Reviewed (scanned into chart)    Subjective:   New patient to me 79-year-old gentleman presents for evaluation of some left-sided knee pain he states he knows he has significant knee arthritis he had problems with his right knee in the past which responded well to gel injection the left knee is giving him pain along the medial side of his knee difficulty bearing weight difficulty stand -Encounter note 9/24/24      Current Condition:   Same    Pain:  Pain Assessment: 0-10  0-10 (Numeric) Pain Score: 2  Pain Location: Hip  Pain Orientation: Left  Pain Descriptors: Aching  Pain Frequency: Intermittent  Clinical Progression: Not changed  Aggravating Factors:  Standing and Walking  Relieving Factors:  Rest    Relevant Information (PMH & Previous Tests/Imaging): XR 9/24/24  FINDINGS:  Left knee weightbearing four views. Severe knee arthritis is varus  deformity bone-on-bone articulation medial joint space no evidence of  fracture dislocation or other bony abnormality      Previous Interventions/Treatments: None    Prior Level of Function (PLOF)  Patient previously independent with all ADLs  Exercise/Physical Activity: NA  Work/School: NA    Patients Living Environment: Reviewed and no concern    Primary " Language: English     Patient's Goal(s) for Therapy: to be able to walk better    Red Flags: Do you have any of the following? No  Fever/chills, unexplained weight changes, dizziness/fainting, unexplained change in bowel or bladder functions, unexplained malaise or muscle weakness, night pain/sweats, numbness or tingling    Objective:    Knee ROM (degrees) -  L knee Extension: -20 AROM   L knee Flexion: 125 AROM       Knee MMT (/5) -   L knee Extension: 5  L knee Flexion: 4+     Hip MMT (/5) -   L hip Flexion: 5  L hip Adduction: 5  L hip Abduction: 5  L hip IR 4  L hip ER 4    Hip ROM  L hip Flex 100  L hip Ex 0  L hip Ab 45  L hip Ad 0  L hip IR 10  L hip ER 40      Palpation -  TTP to tender points  Tightness Noted of L hamstrings, Hip Flexors    Special Tests -  Lachman negative left  Anterior Drawer negative left  Posterior Drawer negative left  Valgus/Varus negative left    Balance  Dynamic standing balance -P/N    Gait -  Pt is ambulating with an assistive device (4WW). Pattern: Increased HENNY, decreased velocity, unsteady w/o AD    Posture -   BL genu varum, FF trunk      Outcome Measures:  Other Measures  Lower Extremity Funtional Score (LEFS): 26       EDUCATION: Pt educated in HEP, PT POC, anatomy, activity avoidance, proper positioning/posture, pt demonstrates understanding of PT info, all questions answered.      Goals: Set and discussed today  Increase Dyn Standing Balance to G/N  Increase ROM to WFL of L knee  Increase Strength where deficits noted by 1/3 to 1 mm grade of L knee  Ind w/ HEP to expedite progress and promote goal achievement of L knee  Decrease Outcome score to  evidence of improved function of L knee  Return to PLOF of L knee  Decrease pain to 2/10 or less of L knee      Plan of care was developed with input and agreement by the patient.    Treatment Performed:    Therapeutic Exercise:    8 min  Seated unilateral hip flexion 20x  LAQ 20x  Seated Ball Sqz 20x  Seated GS/QS  10x10s      Assessment: 80 y/o M presents with c/o L knee pain. Upon examination patient demonstrates impaired gait limiting overall functional mobility including stair climbing. Activity limitations and participations restrictions include amb. Pt to benefit from outpatient PT to address deficits, maximize functional mobility and improve QOL.  The clinical presentation of this patient is stable and their history and examination findings are consistent with a low complexity evaluation with good rehab potential.  Pt is noted high fall risk and would benefit from balance acts to reduce risk of falls.          Plan:     Planned Interventions include: therapeutic exercise, self-care home management, manual therapy, therapeutic activities, gait training, neuromuscular coordination, vasopneumatic, dry needling, aquatic therapy and modalities PRN.  Frequency: 2x/wk  Duration: 4weeks      Anup Dumont PT

## 2024-10-11 ENCOUNTER — EVALUATION (OUTPATIENT)
Dept: PHYSICAL THERAPY | Facility: CLINIC | Age: 79
End: 2024-10-11
Payer: MEDICARE

## 2024-10-11 DIAGNOSIS — E11.9 DIABETES MELLITUS TREATED WITH INSULIN AND ORAL MEDICATION (MULTI): ICD-10-CM

## 2024-10-11 DIAGNOSIS — Z79.4 DIABETES MELLITUS TREATED WITH INSULIN AND ORAL MEDICATION (MULTI): ICD-10-CM

## 2024-10-11 DIAGNOSIS — I10 ESSENTIAL HYPERTENSION: ICD-10-CM

## 2024-10-11 DIAGNOSIS — R26.81 UNSTEADY GAIT: ICD-10-CM

## 2024-10-11 DIAGNOSIS — Z79.84 DIABETES MELLITUS TREATED WITH INSULIN AND ORAL MEDICATION (MULTI): ICD-10-CM

## 2024-10-11 DIAGNOSIS — R53.1 WEAKNESS: Primary | ICD-10-CM

## 2024-10-11 PROCEDURE — 97161 PT EVAL LOW COMPLEX 20 MIN: CPT | Mod: GP | Performed by: PHYSICAL THERAPIST

## 2024-10-11 PROCEDURE — 97110 THERAPEUTIC EXERCISES: CPT | Mod: GP | Performed by: PHYSICAL THERAPIST

## 2024-10-11 ASSESSMENT — PAIN SCALES - GENERAL: PAINLEVEL_OUTOF10: 2

## 2024-10-11 ASSESSMENT — ENCOUNTER SYMPTOMS
DEPRESSION: 0
OCCASIONAL FEELINGS OF UNSTEADINESS: 1
LOSS OF SENSATION IN FEET: 0

## 2024-10-11 ASSESSMENT — PAIN - FUNCTIONAL ASSESSMENT: PAIN_FUNCTIONAL_ASSESSMENT: 0-10

## 2024-10-11 ASSESSMENT — PAIN DESCRIPTION - DESCRIPTORS: DESCRIPTORS: ACHING

## 2024-10-14 ENCOUNTER — LAB (OUTPATIENT)
Dept: LAB | Facility: CLINIC | Age: 79
End: 2024-10-14
Payer: MEDICARE

## 2024-10-14 ENCOUNTER — INFUSION (OUTPATIENT)
Dept: HEMATOLOGY/ONCOLOGY | Facility: CLINIC | Age: 79
End: 2024-10-14
Payer: MEDICARE

## 2024-10-14 VITALS
DIASTOLIC BLOOD PRESSURE: 61 MMHG | RESPIRATION RATE: 16 BRPM | OXYGEN SATURATION: 97 % | BODY MASS INDEX: 27.74 KG/M2 | SYSTOLIC BLOOD PRESSURE: 116 MMHG | WEIGHT: 171.85 LBS | HEART RATE: 80 BPM | TEMPERATURE: 97.2 F

## 2024-10-14 DIAGNOSIS — D46.1 REFRACTORY ANEMIA WITH RING SIDEROBLASTS (MULTI): ICD-10-CM

## 2024-10-14 DIAGNOSIS — D46.Z MDS (MYELODYSPLASTIC SYNDROME), LOW GRADE (MULTI): ICD-10-CM

## 2024-10-14 LAB
ALBUMIN SERPL BCP-MCNC: 4.7 G/DL (ref 3.4–5)
ALP SERPL-CCNC: 72 U/L (ref 33–136)
ALT SERPL W P-5'-P-CCNC: 16 U/L (ref 10–52)
ANION GAP SERPL CALC-SCNC: 15 MMOL/L (ref 10–20)
AST SERPL W P-5'-P-CCNC: 12 U/L (ref 9–39)
BASOPHILS # BLD AUTO: 0.13 X10*3/UL (ref 0–0.1)
BASOPHILS NFR BLD AUTO: 1.7 %
BILIRUB SERPL-MCNC: 1.4 MG/DL (ref 0–1.2)
BUN SERPL-MCNC: 35 MG/DL (ref 6–23)
CALCIUM SERPL-MCNC: 9.6 MG/DL (ref 8.6–10.3)
CHLORIDE SERPL-SCNC: 106 MMOL/L (ref 98–107)
CO2 SERPL-SCNC: 24 MMOL/L (ref 21–32)
CREAT SERPL-MCNC: 1.8 MG/DL (ref 0.5–1.3)
EGFRCR SERPLBLD CKD-EPI 2021: 38 ML/MIN/1.73M*2
EOSINOPHIL # BLD AUTO: 0.63 X10*3/UL (ref 0–0.4)
EOSINOPHIL NFR BLD AUTO: 8.1 %
ERYTHROCYTE [DISTWIDTH] IN BLOOD BY AUTOMATED COUNT: 22.1 % (ref 11.5–14.5)
GLUCOSE SERPL-MCNC: 176 MG/DL (ref 74–99)
HCT VFR BLD AUTO: 30.9 % (ref 41–52)
HGB BLD-MCNC: 10.6 G/DL (ref 13.5–17.5)
IMM GRANULOCYTES # BLD AUTO: 0.04 X10*3/UL (ref 0–0.5)
IMM GRANULOCYTES NFR BLD AUTO: 0.5 % (ref 0–0.9)
LYMPHOCYTES # BLD AUTO: 1.68 X10*3/UL (ref 0.8–3)
LYMPHOCYTES NFR BLD AUTO: 21.5 %
MCH RBC QN AUTO: 38.7 PG (ref 26–34)
MCHC RBC AUTO-ENTMCNC: 34.3 G/DL (ref 32–36)
MCV RBC AUTO: 113 FL (ref 80–100)
MONOCYTES # BLD AUTO: 0.78 X10*3/UL (ref 0.05–0.8)
MONOCYTES NFR BLD AUTO: 10 %
NEUTROPHILS # BLD AUTO: 4.54 X10*3/UL (ref 1.6–5.5)
NEUTROPHILS NFR BLD AUTO: 58.2 %
PLATELET # BLD AUTO: 152 X10*3/UL (ref 150–450)
POTASSIUM SERPL-SCNC: 4.5 MMOL/L (ref 3.5–5.3)
PROT SERPL-MCNC: 6.9 G/DL (ref 6.4–8.2)
RBC # BLD AUTO: 2.74 X10*6/UL (ref 4.5–5.9)
SODIUM SERPL-SCNC: 140 MMOL/L (ref 136–145)
WBC # BLD AUTO: 7.8 X10*3/UL (ref 4.4–11.3)

## 2024-10-14 PROCEDURE — 96372 THER/PROPH/DIAG INJ SC/IM: CPT

## 2024-10-14 PROCEDURE — 84075 ASSAY ALKALINE PHOSPHATASE: CPT

## 2024-10-14 PROCEDURE — 85025 COMPLETE CBC W/AUTO DIFF WBC: CPT

## 2024-10-14 PROCEDURE — 36415 COLL VENOUS BLD VENIPUNCTURE: CPT

## 2024-10-14 PROCEDURE — 2500000004 HC RX 250 GENERAL PHARMACY W/ HCPCS (ALT 636 FOR OP/ED): Performed by: INTERNAL MEDICINE

## 2024-10-14 RX ORDER — DIPHENHYDRAMINE HYDROCHLORIDE 50 MG/ML
50 INJECTION INTRAMUSCULAR; INTRAVENOUS AS NEEDED
Status: DISCONTINUED | OUTPATIENT
Start: 2024-10-14 | End: 2024-10-14 | Stop reason: HOSPADM

## 2024-10-14 RX ORDER — LISINOPRIL 30 MG/1
30 TABLET ORAL DAILY
Qty: 90 TABLET | Refills: 1 | Status: SHIPPED | OUTPATIENT
Start: 2024-10-14

## 2024-10-14 RX ORDER — EPINEPHRINE 0.3 MG/.3ML
0.3 INJECTION SUBCUTANEOUS EVERY 5 MIN PRN
OUTPATIENT
Start: 2024-11-04

## 2024-10-14 RX ORDER — FAMOTIDINE 10 MG/ML
20 INJECTION INTRAVENOUS ONCE AS NEEDED
Status: DISCONTINUED | OUTPATIENT
Start: 2024-10-14 | End: 2024-10-14 | Stop reason: HOSPADM

## 2024-10-14 RX ORDER — DIPHENHYDRAMINE HYDROCHLORIDE 50 MG/ML
50 INJECTION INTRAMUSCULAR; INTRAVENOUS AS NEEDED
OUTPATIENT
Start: 2024-11-04

## 2024-10-14 RX ORDER — ALBUTEROL SULFATE 0.83 MG/ML
3 SOLUTION RESPIRATORY (INHALATION) AS NEEDED
OUTPATIENT
Start: 2024-11-04

## 2024-10-14 RX ORDER — GLIMEPIRIDE 4 MG/1
4 TABLET ORAL 2 TIMES DAILY
Qty: 180 TABLET | Refills: 1 | Status: SHIPPED | OUTPATIENT
Start: 2024-10-14

## 2024-10-14 RX ORDER — ALBUTEROL SULFATE 0.83 MG/ML
3 SOLUTION RESPIRATORY (INHALATION) AS NEEDED
Status: DISCONTINUED | OUTPATIENT
Start: 2024-10-14 | End: 2024-10-14 | Stop reason: HOSPADM

## 2024-10-14 RX ORDER — EPINEPHRINE 0.3 MG/.3ML
0.3 INJECTION SUBCUTANEOUS EVERY 5 MIN PRN
Status: DISCONTINUED | OUTPATIENT
Start: 2024-10-14 | End: 2024-10-14 | Stop reason: HOSPADM

## 2024-10-14 RX ORDER — FAMOTIDINE 10 MG/ML
20 INJECTION INTRAVENOUS ONCE AS NEEDED
OUTPATIENT
Start: 2024-11-04

## 2024-10-17 ENCOUNTER — APPOINTMENT (OUTPATIENT)
Dept: HEMATOLOGY/ONCOLOGY | Facility: CLINIC | Age: 79
End: 2024-10-17
Payer: MEDICARE

## 2024-10-22 ENCOUNTER — TREATMENT (OUTPATIENT)
Dept: PHYSICAL THERAPY | Facility: CLINIC | Age: 79
End: 2024-10-22
Payer: MEDICARE

## 2024-10-22 DIAGNOSIS — Z79.4 DIABETES MELLITUS TREATED WITH INSULIN AND ORAL MEDICATION (MULTI): ICD-10-CM

## 2024-10-22 DIAGNOSIS — R53.1 WEAKNESS: ICD-10-CM

## 2024-10-22 DIAGNOSIS — Z79.84 DIABETES MELLITUS TREATED WITH INSULIN AND ORAL MEDICATION (MULTI): ICD-10-CM

## 2024-10-22 DIAGNOSIS — E11.9 DIABETES MELLITUS TREATED WITH INSULIN AND ORAL MEDICATION (MULTI): ICD-10-CM

## 2024-10-22 PROCEDURE — 97110 THERAPEUTIC EXERCISES: CPT | Mod: GP,CQ

## 2024-10-22 RX ORDER — PEN NEEDLE, DIABETIC 31 GX5/16"
NEEDLE, DISPOSABLE MISCELLANEOUS
Qty: 100 EACH | Refills: 3 | Status: SHIPPED | OUTPATIENT
Start: 2024-10-22

## 2024-10-22 ASSESSMENT — PAIN SCALES - GENERAL: PAINLEVEL_OUTOF10: 3

## 2024-10-22 ASSESSMENT — PAIN - FUNCTIONAL ASSESSMENT: PAIN_FUNCTIONAL_ASSESSMENT: 0-10

## 2024-10-22 NOTE — PROGRESS NOTES
"  Physical Therapy  Physical Therapy Evaluation    Patient Name: Chas Melgar \"Javid\"  MRN: [id number]  Today's Date: 10/22/2024  Time Calculation  Start Time: 0131  Stop Time: 0215  Time Calculation (min): 44 min  PT Therapeutic Procedures Time Entry  Therapeutic Exercise Time Entry: 44 min        Insurance:  COPAY 0 (MET)  Visit number: 2 of 9  Authorization info: No Auth Req  Insurance Type: MEDICARE/ [employer] [id number] ($0 USED )      General:  Reason for visit: LLE weakness        Referred by: Jimmy Helm MD     Current Problem:  R26.81 Unsteady gait  R53.1 Weakness  W19.XXXS Fall, sequela    Precautions: per MR       Subjective:   Reports he is tired. Reports he has been riding his floor cycle more than doing his exercises. Also states he is walking in the house with no AD      Pain:  3/10 current lowback pain      Objective:      Treatment Performed:  Seated hip flexion 10 s x 10  LAQ 20x   Seated Ball squeeze x 20  Seated QS 10s x 10  Sit to stand x10  Lateral  and forward weight shifts x 10 ea  Squat to Plinth 2 x10  Heel Raises x 20  Static stand with narrow HENNY no UE support x 30 sec  Static stand with narrow HENNY no UE support eyes closed x 30 sec        EDUCATION:   Encouraged compliance with current HEP      Assessment:   Requires cues to perform transfers with proper hand placement to improve safety. Unsteady during standing balance activities, requires close supervision to decrease risk for falls. Fatigued with lower extremity strengthening. Cues to increase to step height during ambulation. Continues to require PT to focus on balance and strength to improve functional mobility      Plan:     Continue with knee strength and balance to improve safety during ADL's.  "

## 2024-10-22 NOTE — TELEPHONE ENCOUNTER
Patient left voicemail requesting refill on Levemir. Per chart, patient not using. Will send patient [employer] message to verify

## 2024-10-23 NOTE — PROGRESS NOTES
Physical Therapy Treatment    Patient Name: Chas Melgar  MRN: [id number]  Today's Date: 10/24/2024  Time Calculation  Start Time: 0233  Stop Time: 0314  Time Calculation (min): 41 min     PT Therapeutic Procedures Time Entry  Therapeutic Exercise Time Entry: 41                   Current Problem  R26.81 Unsteady gait  R53.1 Weakness  W19.XXXS Fall, sequela      Insurance:  COPAY 0 (MET)  Visit number: 3 of 9  Authorization info: No Auth Req  Insurance Type: MEDICARE/ [employer] [id number] ($0 USED )      Precautions       Subjective   Pt states he was sore after last visit,. But not bad.  Pain  Pain Assessment: 0-10  Pain Location: Hip  Pain Orientation: Left      Objective     Treatments:  PABLO 5'  Seated hip flexion 10x10s BL  LAQ 2# 3x10  Seated Ball squeeze/Belt push 10x10s  Seated QS 10s x 10---  Sit to stand x10  Lateral  and forward weight shifts x 10 ea---  Squat to Plinth 2 x10---  Heel Raises x 20---  Static stand with narrow HENNY no UE support x 30 sec  Static stand with narrow HENNY no UE support eyes closed x 30 sec  Bridges 3x10  SLR 3x10 BL  Hooklying Hip AB RTB 3x10  (--- = NT)    Assessment:  Pt able to tolerate tx session well this date w/ no adverse effects noted from tx.  Pt compliant w/ program and appears to understand rationale for therapy.  Still requires skilled therapy for increasing strength/ROM for performing ADLs. Needed rest room break.  Cues req to count appropriately.     Plan:  D/t noted impairments and dysfunction of  BLE, PT will cont to address deficits of strength and ROM via therapeutic exs and modalities PRN and further assist w/ pain reduction.             Anup Dumont, PT

## 2024-10-24 ENCOUNTER — TREATMENT (OUTPATIENT)
Dept: PHYSICAL THERAPY | Facility: CLINIC | Age: 79
End: 2024-10-24
Payer: MEDICARE

## 2024-10-24 ENCOUNTER — APPOINTMENT (OUTPATIENT)
Dept: CARDIOLOGY | Facility: CLINIC | Age: 79
End: 2024-10-24
Payer: MEDICARE

## 2024-10-24 VITALS
SYSTOLIC BLOOD PRESSURE: 102 MMHG | BODY MASS INDEX: 27.72 KG/M2 | HEIGHT: 66 IN | WEIGHT: 172.5 LBS | DIASTOLIC BLOOD PRESSURE: 70 MMHG | HEART RATE: 59 BPM

## 2024-10-24 DIAGNOSIS — D64.9 CHRONIC ANEMIA: ICD-10-CM

## 2024-10-24 DIAGNOSIS — Z79.4 DIABETES MELLITUS TREATED WITH INSULIN AND ORAL MEDICATION (MULTI): ICD-10-CM

## 2024-10-24 DIAGNOSIS — R26.81 UNSTEADY GAIT: Primary | ICD-10-CM

## 2024-10-24 DIAGNOSIS — I10 ESSENTIAL HYPERTENSION: ICD-10-CM

## 2024-10-24 DIAGNOSIS — E11.9 DIABETES MELLITUS TREATED WITH INSULIN AND ORAL MEDICATION (MULTI): ICD-10-CM

## 2024-10-24 DIAGNOSIS — Z87.891 FORMER SMOKER: ICD-10-CM

## 2024-10-24 DIAGNOSIS — E78.2 MIXED HYPERLIPIDEMIA: ICD-10-CM

## 2024-10-24 DIAGNOSIS — R53.1 WEAKNESS: ICD-10-CM

## 2024-10-24 DIAGNOSIS — Z79.84 DIABETES MELLITUS TREATED WITH INSULIN AND ORAL MEDICATION (MULTI): ICD-10-CM

## 2024-10-24 DIAGNOSIS — I25.10 CORONARY ARTERY DISEASE INVOLVING NATIVE CORONARY ARTERY OF NATIVE HEART, UNSPECIFIED WHETHER ANGINA PRESENT: ICD-10-CM

## 2024-10-24 DIAGNOSIS — Z95.1 S/P CABG X 5: ICD-10-CM

## 2024-10-24 PROCEDURE — 3078F DIAST BP <80 MM HG: CPT | Performed by: INTERNAL MEDICINE

## 2024-10-24 PROCEDURE — 3074F SYST BP LT 130 MM HG: CPT | Performed by: INTERNAL MEDICINE

## 2024-10-24 PROCEDURE — 1036F TOBACCO NON-USER: CPT | Performed by: INTERNAL MEDICINE

## 2024-10-24 PROCEDURE — 99213 OFFICE O/P EST LOW 20 MIN: CPT | Performed by: INTERNAL MEDICINE

## 2024-10-24 PROCEDURE — 1123F ACP DISCUSS/DSCN MKR DOCD: CPT | Performed by: INTERNAL MEDICINE

## 2024-10-24 PROCEDURE — 1159F MED LIST DOCD IN RCRD: CPT | Performed by: INTERNAL MEDICINE

## 2024-10-24 PROCEDURE — 97110 THERAPEUTIC EXERCISES: CPT | Mod: GP | Performed by: PHYSICAL THERAPIST

## 2024-10-24 RX ORDER — ALFUZOSIN HYDROCHLORIDE 10 MG/1
1 TABLET, EXTENDED RELEASE ORAL
COMMUNITY
Start: 2024-10-11

## 2024-10-24 ASSESSMENT — PAIN - FUNCTIONAL ASSESSMENT: PAIN_FUNCTIONAL_ASSESSMENT: 0-10

## 2024-10-24 NOTE — PROGRESS NOTES
Referred by Dr. Marroquin ref. provider found provider found for   Chief Complaint   Patient presents with    Follow-up     9 month follow up         History of Present Illness  Chas Melgar is a 79 y.o. year old male patient is here for follow-up.  History of peripheral vascular disease but stable at this point.  He had history of coronary disease but also stable.  He has chronic anemia been followed by hematologist.  Symptoms of chest pain.  He is apparently going through rehab and physical therapy getting stronger although he still walking with the help of a cane.  I discussed with the patient Kneifl continue medication will call for any problem and follow-up as scheduled  Past Medical History  Past Medical History:   Diagnosis Date    Acute gastric ulcer with hemorrhage 09/07/2022    Acute gastric ulcer with bleeding    Acute upper respiratory infection, unspecified     URI, acute    Arthritis     Body mass index (BMI) 28.0-28.9, adult 10/19/2021    BMI 28.0-28.9,adult    Cancer (Multi)     Coronary artery disease     Diabetes mellitus (Multi)     Encounter for other preprocedural examination 10/14/2021    Pre-operative clearance    Encounter for screening for malignant neoplasm of prostate     Encounter for prostate cancer screening    Impingement syndrome of unspecified shoulder 07/22/2021    Impingement syndrome of shoulder region, unspecified laterality    Other abnormalities of breathing     Difficulty breathing    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)     Other specified disorders of pancreatic internal secretion    Other specified follicular disorders     Actinic folliculitis    Other specified postprocedural states 06/09/2021    Status post arthroscopy of left shoulder    Overweight 02/21/2022    Overweight with body mass index (BMI) of 29 to 29.9 in adult    Overweight 01/18/2022    Overweight with body mass index (BMI) of 28 to 28.9 in adult    Pain in right hip     Hip  pain, right    Pain in right leg     Pain of right lower extremity    Pain in unspecified hip 08/04/2021    Hip joint pain    Pain in unspecified shoulder 08/10/2021    Shoulder pain    Personal history of malignant neoplasm, unspecified 08/04/2021    History of malignant neoplasm    Personal history of other diseases of male genital organs     History of benign prostatic hyperplasia    Personal history of other diseases of the circulatory system 08/04/2021    History of hypertension    Personal history of other diseases of the circulatory system 08/04/2021    History of cardiac disorder    Personal history of other diseases of the digestive system 10/14/2021    History of gastrointestinal hemorrhage    Personal history of other diseases of the digestive system 02/26/2022    History of right inguinal hernia    Personal history of other diseases of the digestive system     History of fatty infiltration of liver    Personal history of other diseases of the musculoskeletal system and connective tissue 08/04/2021    History of arthritis    Personal history of other diseases of the musculoskeletal system and connective tissue 08/05/2021    History of rotator cuff tear    Personal history of other diseases of the respiratory system 11/17/2021    History of upper respiratory infection    Personal history of other diseases of the respiratory system     History of acute bronchitis    Personal history of other diseases of the respiratory system     History of acute pharyngitis    Personal history of other diseases of the respiratory system     History of acute sinusitis    Personal history of other diseases of urinary system 09/01/2022    History of renal insufficiency syndrome    Personal history of other endocrine, nutritional and metabolic disease 10/06/2021    History of diabetes mellitus    Personal history of other endocrine, nutritional and metabolic disease 01/18/2022    History of hyperkalemia    Personal history of  other malignant neoplasm of skin     History of other malignant neoplasm of skin    Personal history of other specified conditions 2022    History of chest pain    Personal history of other specified conditions     History of dysuria    Personal history of other specified conditions     History of chest pain    Persons encountering health services in other specified circumstances     Encounter for support and coordination of transition of care    Primary osteoarthritis, 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    DJD of left shoulder    Right lower quadrant pain 2022    Right inguinal pain    Strain of muscle, fascia and tendon of lower back, initial encounter     Lumbar strain    Strain of muscle, fascia and tendon of other parts of biceps, right arm, initial encounter 10/14/2021    Rupture of right biceps tendon, initial encounter    Trochanteric bursitis, left hip 08/10/2021    Trochanteric bursitis of left hip       Social History  Social History     Tobacco Use    Smoking status: Former     Current packs/day: 0.00     Average packs/day: 2.0 packs/day for 20.0 years (40.0 ttl pk-yrs)     Types: Cigarettes     Start date: 1966     Quit date: 1986     Years since quittin.8    Smokeless tobacco: Never   Substance Use Topics    Alcohol use: Not Currently    Drug use: Never       Family History     Family History   Problem Relation Name Age of Onset    Diabetes Mother Melinda andino     Coronary artery disease Father      Hypertension Other      Alzheimer's disease Other         Review of Systems  As per HPI, all other systems reviewed and negative.    Allergies:  Allergies   Allergen Reactions    Aluminum Rash     Aluminum    Nickel Unknown and Rash     NICKEL  CONTACT DERMATITIS        Outpatient Medications:  Current Outpatient Medications   Medication Instructions    aspirin 81 mg EC tablet 1 tablet, oral, Daily    Basaglar KwikPen U-100 Insulin 20 Units, subcutaneous, Nightly    BD Ultra-Fine Short Pen  "Needle 31 gauge x 5/16\" needle USE ONCE DAILY    calcitriol (ROCALTROL) 0.25 mcg, oral, Every other day    cyanocobalamin (Vitamin B-12) 1,000 mcg tablet 2 tablets, oral, Daily    dilTIAZem CD (CARDIZEM CD) 240 mg, oral, Daily    Farxiga 10 mg, oral, Daily before breakfast    folic acid 0.8 mg capsule 1 tablet, oral, Nightly    glimepiride (AMARYL) 4 mg, oral, 2 times daily    hydroCHLOROthiazide (HYDRODIURIL) 25 mg, oral, Daily    isosorbide mononitrate ER (IMDUR) 30 mg, oral, Daily    lisinopril 30 mg, oral, Daily    nitroglycerin (NITROSTAT) 0.4 mg, sublingual, Every 5 min PRN    pantoprazole (PROTONIX) 40 mg, oral, Daily    simvastatin (ZOCOR) 20 mg, oral, Nightly    tamsulosin (Flomax) 0.4 mg 24 hr capsule 1 capsule, oral, Daily         Vitals:  There were no vitals filed for this visit.    Physical Exam:  Physical Exam  Vitals and nursing note reviewed.   Constitutional:       Appearance: Normal appearance.   HENT:      Head: Normocephalic and atraumatic.   Eyes:      Extraocular Movements: Extraocular movements intact.      Pupils: Pupils are equal, round, and reactive to light.   Cardiovascular:      Rate and Rhythm: Normal rate and regular rhythm.      Pulses: Normal pulses.   Pulmonary:      Effort: Pulmonary effort is normal.      Breath sounds: Normal breath sounds.   Musculoskeletal:         General: Normal range of motion.      Cervical back: Normal range of motion.      Right lower leg: No edema.      Left lower leg: No edema.   Skin:     General: Skin is warm and dry.   Neurological:      General: No focal deficit present.      Mental Status: He is alert and oriented to person, place, and time.             Assessment/Plan   Diagnoses and all orders for this visit:  Coronary artery disease involving native coronary artery of native heart, unspecified whether angina present  S/P CABG x 5  Essential hypertension  Mixed hyperlipidemia  Diabetes mellitus treated with insulin and oral medication " (Multi)  BMI 27.0-27.9,adult  Former smoker  Chronic anemia          Wilfrido Goodwin MD [facility]  Interventional Cardiology   of [facility]     Thank you for allowing me to participate in the care of this patient. Please do not hesitate to contact me with any further questions or concerns.

## 2024-10-24 NOTE — PATIENT INSTRUCTIONS
Follow up office visit in 9 months    Continue same medications/treatment.  Patient educated on proper medication use.  Patient educated on risk factor modification.  Please bring any lab results from other providers / physicians to your next appointment.    Please bring all medicines, vitamins and herbal supplements with you when you come to the office.    Prescriptions will not be filled unless you are compliant with your follow up appointments or have a follow up  appointment scheduled as per instruction of your physician.  Refills should be requested at the time of  Your visit.  IRocio LPN, am scribing for and in the presence of  Dr. Wilfrido Goodwin MD, FACC

## 2024-10-26 RX ORDER — HEPARIN SODIUM,PORCINE/PF 10 UNIT/ML
50 SYRINGE (ML) INTRAVENOUS AS NEEDED
OUTPATIENT
Start: 2024-10-26

## 2024-10-26 RX ORDER — HEPARIN 100 UNIT/ML
500 SYRINGE INTRAVENOUS AS NEEDED
OUTPATIENT
Start: 2024-10-26

## 2024-10-28 ENCOUNTER — APPOINTMENT (OUTPATIENT)
Dept: PRIMARY CARE | Facility: CLINIC | Age: 79
End: 2024-10-28
Payer: MEDICARE

## 2024-10-28 VITALS
DIASTOLIC BLOOD PRESSURE: 66 MMHG | TEMPERATURE: 98.2 F | HEART RATE: 88 BPM | SYSTOLIC BLOOD PRESSURE: 118 MMHG | BODY MASS INDEX: 27.83 KG/M2 | WEIGHT: 173.2 LBS | HEIGHT: 66 IN

## 2024-10-28 DIAGNOSIS — Z87.891 FORMER SMOKER: ICD-10-CM

## 2024-10-28 DIAGNOSIS — Z23 NEED FOR INFLUENZA VACCINATION: ICD-10-CM
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
## 2024-10-28 DIAGNOSIS — E66.3 OVERWEIGHT WITH BODY MASS INDEX (BMI) OF 27 TO 27.9 IN ADULT: ICD-10-CM

## 2024-10-28 DIAGNOSIS — H02.403 PTOSIS OF BOTH EYELIDS: ICD-10-CM

## 2024-10-28 DIAGNOSIS — E08.65 DIABETES MELLITUS DUE TO UNDERLYING CONDITION WITH HYPERGLYCEMIA, WITH LONG-TERM CURRENT USE OF INSULIN: ICD-10-CM

## 2024-10-28 DIAGNOSIS — Z79.4 DIABETES MELLITUS DUE TO UNDERLYING CONDITION WITH HYPERGLYCEMIA, WITH LONG-TERM CURRENT USE OF INSULIN: ICD-10-CM

## 2024-10-28 DIAGNOSIS — R26.81 UNSTEADY GAIT: ICD-10-CM

## 2024-10-28 DIAGNOSIS — R25.1 TREMOR OF LEFT HAND: ICD-10-CM

## 2024-10-28 DIAGNOSIS — R53.1 WEAKNESS: ICD-10-CM

## 2024-10-28 PROCEDURE — 1160F RVW MEDS BY RX/DR IN RCRD: CPT | Performed by: FAMILY MEDICINE

## 2024-10-28 PROCEDURE — 1159F MED LIST DOCD IN RCRD: CPT | Performed by: FAMILY MEDICINE

## 2024-10-28 PROCEDURE — 3074F SYST BP LT 130 MM HG: CPT | Performed by: FAMILY MEDICINE

## 2024-10-28 PROCEDURE — 3078F DIAST BP <80 MM HG: CPT | Performed by: FAMILY MEDICINE

## 2024-10-28 PROCEDURE — 1123F ACP DISCUSS/DSCN MKR DOCD: CPT | Performed by: FAMILY MEDICINE

## 2024-10-28 PROCEDURE — 1036F TOBACCO NON-USER: CPT | Performed by: FAMILY MEDICINE

## 2024-10-28 PROCEDURE — G0008 ADMIN INFLUENZA VIRUS VAC: HCPCS | Performed by: FAMILY MEDICINE

## 2024-10-28 PROCEDURE — 1158F ADVNC CARE PLAN TLK DOCD: CPT | Performed by: FAMILY MEDICINE

## 2024-10-28 PROCEDURE — 90662 IIV NO PRSV INCREASED AG IM: CPT | Performed by: FAMILY MEDICINE

## 2024-10-28 PROCEDURE — 99213 OFFICE O/P EST LOW 20 MIN: CPT | Performed by: FAMILY MEDICINE

## 2024-10-28 ASSESSMENT — ENCOUNTER SYMPTOMS
ALLERGIC/IMMUNOLOGIC NEGATIVE: 1
CONSTITUTIONAL NEGATIVE: 1
GASTROINTESTINAL NEGATIVE: 1
ENDOCRINE NEGATIVE: 1
PSYCHIATRIC NEGATIVE: 1
CARDIOVASCULAR NEGATIVE: 1
HEMATOLOGIC/LYMPHATIC NEGATIVE: 1
RESPIRATORY NEGATIVE: 1
EYES NEGATIVE: 1
MUSCULOSKELETAL NEGATIVE: 1

## 2024-10-29 ENCOUNTER — TREATMENT (OUTPATIENT)
Dept: PHYSICAL THERAPY | Facility: CLINIC | Age: 79
End: 2024-10-29
Payer: MEDICARE

## 2024-10-29 DIAGNOSIS — R53.1 WEAKNESS: ICD-10-CM

## 2024-10-29 PROCEDURE — 97110 THERAPEUTIC EXERCISES: CPT | Mod: GP,CQ

## 2024-10-29 ASSESSMENT — PAIN - FUNCTIONAL ASSESSMENT: PAIN_FUNCTIONAL_ASSESSMENT: 0-10

## 2024-10-29 ASSESSMENT — PAIN SCALES - GENERAL: PAINLEVEL_OUTOF10: 0 - NO PAIN

## 2024-10-31 ENCOUNTER — TREATMENT (OUTPATIENT)
Dept: PHYSICAL THERAPY | Facility: CLINIC | Age: 79
End: 2024-10-31
Payer: MEDICARE

## 2024-10-31 DIAGNOSIS — R53.1 WEAKNESS: Primary | ICD-10-CM

## 2024-10-31 PROCEDURE — 97110 THERAPEUTIC EXERCISES: CPT | Mod: GP,CQ

## 2024-10-31 PROCEDURE — 97112 NEUROMUSCULAR REEDUCATION: CPT | Mod: GP,CQ

## 2024-11-04 ENCOUNTER — INFUSION (OUTPATIENT)
Dept: HEMATOLOGY/ONCOLOGY | Facility: CLINIC | Age: 79
End: 2024-11-04
Payer: MEDICARE

## 2024-11-04 ENCOUNTER — LAB (OUTPATIENT)
Dept: LAB | Facility: CLINIC | Age: 79
End: 2024-11-04
Payer: MEDICARE

## 2024-11-04 VITALS
DIASTOLIC BLOOD PRESSURE: 64 MMHG | BODY MASS INDEX: 27.58 KG/M2 | TEMPERATURE: 98.6 F | OXYGEN SATURATION: 96 % | HEART RATE: 76 BPM | WEIGHT: 170.86 LBS | SYSTOLIC BLOOD PRESSURE: 114 MMHG | RESPIRATION RATE: 16 BRPM

## 2024-11-04 DIAGNOSIS — D46.1 REFRACTORY ANEMIA WITH RING SIDEROBLASTS (MULTI): ICD-10-CM

## 2024-11-04 DIAGNOSIS — D46.Z MDS (MYELODYSPLASTIC SYNDROME), LOW GRADE (MULTI): ICD-10-CM

## 2024-11-04 DIAGNOSIS — E08.65 DIABETES MELLITUS DUE TO UNDERLYING CONDITION WITH HYPERGLYCEMIA, WITH LONG-TERM CURRENT USE OF INSULIN: ICD-10-CM

## 2024-11-04 DIAGNOSIS — Z79.4 DIABETES MELLITUS DUE TO UNDERLYING CONDITION WITH HYPERGLYCEMIA, WITH LONG-TERM CURRENT USE OF INSULIN: ICD-10-CM

## 2024-11-04 LAB
ACANTHOCYTES BLD QL SMEAR: NORMAL
ALBUMIN SERPL BCP-MCNC: 4.4 G/DL (ref 3.4–5)
ALP SERPL-CCNC: 90 U/L (ref 33–136)
ALT SERPL W P-5'-P-CCNC: 13 U/L (ref 10–52)
ANION GAP SERPL CALC-SCNC: 15 MMOL/L (ref 10–20)
AST SERPL W P-5'-P-CCNC: 12 U/L (ref 9–39)
BASOPHILS # BLD AUTO: 0.12 X10*3/UL (ref 0–0.1)
BASOPHILS NFR BLD AUTO: 1 %
BILIRUB SERPL-MCNC: 1 MG/DL (ref 0–1.2)
BUN SERPL-MCNC: 41 MG/DL (ref 6–23)
CALCIUM SERPL-MCNC: 9.6 MG/DL (ref 8.6–10.3)
CHLORIDE SERPL-SCNC: 103 MMOL/L (ref 98–107)
CO2 SERPL-SCNC: 24 MMOL/L (ref 21–32)
CREAT SERPL-MCNC: 1.92 MG/DL (ref 0.5–1.3)
DACRYOCYTES BLD QL SMEAR: NORMAL
EGFRCR SERPLBLD CKD-EPI 2021: 35 ML/MIN/1.73M*2
EOSINOPHIL # BLD AUTO: 0.74 X10*3/UL (ref 0–0.4)
EOSINOPHIL NFR BLD AUTO: 6 %
ERYTHROCYTE [DISTWIDTH] IN BLOOD BY AUTOMATED COUNT: 21.8 % (ref 11.5–14.5)
GIANT PLATELETS BLD QL SMEAR: NORMAL
GLUCOSE SERPL-MCNC: 254 MG/DL (ref 74–99)
HCT VFR BLD AUTO: 33.5 % (ref 41–52)
HGB BLD-MCNC: 11.1 G/DL (ref 13.5–17.5)
HOLD SPECIMEN: NORMAL
HYPOCHROMIA BLD QL SMEAR: NORMAL
IMM GRANULOCYTES # BLD AUTO: 0.04 X10*3/UL (ref 0–0.5)
IMM GRANULOCYTES NFR BLD AUTO: 0.3 % (ref 0–0.9)
LYMPHOCYTES # BLD AUTO: 1.97 X10*3/UL (ref 0.8–3)
LYMPHOCYTES NFR BLD AUTO: 16.1 %
MCH RBC QN AUTO: 37.4 PG (ref 26–34)
MCHC RBC AUTO-ENTMCNC: 33.1 G/DL (ref 32–36)
MCV RBC AUTO: 113 FL (ref 80–100)
MONOCYTES # BLD AUTO: 1.03 X10*3/UL (ref 0.05–0.8)
MONOCYTES NFR BLD AUTO: 8.4 %
NEUTROPHILS # BLD AUTO: 8.36 X10*3/UL (ref 1.6–5.5)
NEUTROPHILS NFR BLD AUTO: 68.2 %
NRBC BLD-RTO: ABNORMAL /100{WBCS}
PLATELET # BLD AUTO: 210 X10*3/UL (ref 150–450)
POTASSIUM SERPL-SCNC: 5 MMOL/L (ref 3.5–5.3)
PROT SERPL-MCNC: 7.1 G/DL (ref 6.4–8.2)
RBC # BLD AUTO: 2.97 X10*6/UL (ref 4.5–5.9)
RBC MORPH BLD: NORMAL
SODIUM SERPL-SCNC: 137 MMOL/L (ref 136–145)
WBC # BLD AUTO: 12.3 X10*3/UL (ref 4.4–11.3)

## 2024-11-04 PROCEDURE — 36415 COLL VENOUS BLD VENIPUNCTURE: CPT

## 2024-11-04 PROCEDURE — 85025 COMPLETE CBC W/AUTO DIFF WBC: CPT

## 2024-11-04 PROCEDURE — 83036 HEMOGLOBIN GLYCOSYLATED A1C: CPT

## 2024-11-04 PROCEDURE — 96372 THER/PROPH/DIAG INJ SC/IM: CPT

## 2024-11-04 PROCEDURE — 84075 ASSAY ALKALINE PHOSPHATASE: CPT

## 2024-11-04 PROCEDURE — 2500000004 HC RX 250 GENERAL PHARMACY W/ HCPCS (ALT 636 FOR OP/ED): Mod: JW | Performed by: INTERNAL MEDICINE

## 2024-11-04 RX ORDER — ALBUTEROL SULFATE 0.83 MG/ML
3 SOLUTION RESPIRATORY (INHALATION) AS NEEDED
OUTPATIENT
Start: 2024-11-25

## 2024-11-04 RX ORDER — DIPHENHYDRAMINE HYDROCHLORIDE 50 MG/ML
50 INJECTION INTRAMUSCULAR; INTRAVENOUS AS NEEDED
Status: DISCONTINUED | OUTPATIENT
Start: 2024-11-04 | End: 2024-11-04 | Stop reason: HOSPADM

## 2024-11-04 RX ORDER — EPINEPHRINE 0.3 MG/.3ML
0.3 INJECTION SUBCUTANEOUS EVERY 5 MIN PRN
OUTPATIENT
Start: 2024-11-25

## 2024-11-04 RX ORDER — DIPHENHYDRAMINE HYDROCHLORIDE 50 MG/ML
50 INJECTION INTRAMUSCULAR; INTRAVENOUS AS NEEDED
OUTPATIENT
Start: 2024-11-25

## 2024-11-04 RX ORDER — EPINEPHRINE 0.3 MG/.3ML
0.3 INJECTION SUBCUTANEOUS EVERY 5 MIN PRN
Status: DISCONTINUED | OUTPATIENT
Start: 2024-11-04 | End: 2024-11-04 | Stop reason: HOSPADM

## 2024-11-04 RX ORDER — FAMOTIDINE 10 MG/ML
20 INJECTION INTRAVENOUS ONCE AS NEEDED
OUTPATIENT
Start: 2024-11-25

## 2024-11-04 RX ORDER — ALBUTEROL SULFATE 0.83 MG/ML
3 SOLUTION RESPIRATORY (INHALATION) AS NEEDED
Status: DISCONTINUED | OUTPATIENT
Start: 2024-11-04 | End: 2024-11-04 | Stop reason: HOSPADM

## 2024-11-04 RX ORDER — FAMOTIDINE 10 MG/ML
20 INJECTION INTRAVENOUS ONCE AS NEEDED
Status: DISCONTINUED | OUTPATIENT
Start: 2024-11-04 | End: 2024-11-04 | Stop reason: HOSPADM

## 2024-11-04 ASSESSMENT — PAIN SCALES - GENERAL: PAINLEVEL_OUTOF10: 2

## 2024-11-05 ENCOUNTER — OFFICE VISIT (OUTPATIENT)
Dept: ORTHOPEDIC SURGERY | Facility: CLINIC | Age: 79
End: 2024-11-05
Payer: MEDICARE

## 2024-11-05 DIAGNOSIS — M17.12 PRIMARY OSTEOARTHRITIS OF LEFT KNEE: Primary | ICD-10-CM

## 2024-11-05 LAB
EST. AVERAGE GLUCOSE BLD GHB EST-MCNC: 160 MG/DL
HBA1C MFR BLD: 7.2 %

## 2024-11-05 PROCEDURE — 1036F TOBACCO NON-USER: CPT | Performed by: ORTHOPAEDIC SURGERY

## 2024-11-05 PROCEDURE — 99211 OFF/OP EST MAY X REQ PHY/QHP: CPT | Mod: 25 | Performed by: ORTHOPAEDIC SURGERY

## 2024-11-05 PROCEDURE — 1159F MED LIST DOCD IN RCRD: CPT | Performed by: ORTHOPAEDIC SURGERY

## 2024-11-05 PROCEDURE — 2500000004 HC RX 250 GENERAL PHARMACY W/ HCPCS (ALT 636 FOR OP/ED): Mod: JZ | Performed by: ORTHOPAEDIC SURGERY

## 2024-11-05 PROCEDURE — 1160F RVW MEDS BY RX/DR IN RCRD: CPT | Performed by: ORTHOPAEDIC SURGERY

## 2024-11-05 PROCEDURE — 20610 DRAIN/INJ JOINT/BURSA W/O US: CPT | Mod: LT | Performed by: ORTHOPAEDIC SURGERY

## 2024-11-05 PROCEDURE — 1123F ACP DISCUSS/DSCN MKR DOCD: CPT | Performed by: ORTHOPAEDIC SURGERY

## 2024-11-05 NOTE — PROGRESS NOTES
Subjective    Patient ID: Chas Melgar    Chief Complaint   Patient presents with    Left Knee - Injections     Gelsyn #1       Inj/Asp: Left knee on 11/5/2024 9:12 AM  Indications: pain  Details: 22 G needle, anteromedial approach  Medications: 2 mL sodium hyaluronate 16.8 mg/2 mL  Outcome: tolerated well, no immediate complications  Procedure, treatment alternatives, risks and benefits explained, specific risks discussed. Consent was given by the patient. Immediately prior to procedure a time out was called to verify the correct patient, procedure, equipment, support staff and site/side marked as required. Patient was prepped and draped in the usual sterile fashion.         Most recent  Radiographs of the LEFT knee were reviewed and revealed degenerative joint changes.  Patient has failed conservative management as outlined below:  Continued pain 6 weeks after last steroid injection. Last steroid injection done on 9/24/24  Continued pain after 6 weeks with anti-inflammatory and/or analgesic medication(s). Dosage and daily intake: Ibuprofen 600-800 mg three times daily (or alternative NSAID equivalent) and Acetaminophen 1,000 mg three times daily  Continued pain after 6 weeks with physician-directed daily activity modification and/or physical therapy

## 2024-11-05 NOTE — PROGRESS NOTES
Physical Therapy Treatment    Patient Name: Chas Melgar  MRN: [id number]  Today's Date: 11/6/2024  Time Calculation  Start Time: 1004  Stop Time: 1045  Time Calculation (min): 41 min     PT Therapeutic Procedures Time Entry  Neuromuscular Re-Education Time Entry: 10  Therapeutic Exercise Time Entry: 29                 Current Problem  1. Weakness  Follow Up In Physical Therapy          Insurance:  COPAY 0 (MET)  Visit number: 6 of 9  Authorization info: No Auth Req  Insurance Type: MEDICARE/ [email or web address] 2230 ($0 USED )       Precautions   Fall risk       Subjective   Subjective:   Pt reports that his lower L leg hurts. He got a gel shot from Dr. Colindres yesterday  Pain   2/10 L lower leg  0/10 R knee    Objective   Treatments:  PABLO 5'  Seated hip flexion 10x10s BL  LAQ 3# 2x10  Bridges 2x10  Supine SLR B 10x2  Hooklying Hip AB GTB 2x10  Sit to stand 2 x10   // bars Hr/TR 10x 2  // bars step ups FwdB 10x      Not today:  Seated Ball squeeze/Belt push 10x10s  Seated QS 10s x 10---  Lateral  and forward weight shifts x 10 ea---  Squat to Plinth 2 x10  Static stand with narrow HENNY no UE support x 30 sec  Static stand with narrow HENNY no UE support eyes closed x 30 sec  Static stand with narrow HENNY on blue cushion x 30 sec  Static stand with narrow HENNY on blue cushion eyes closed x 30 sec    Standing march with slow return 2 x 10  (--- = NT)     OP EDUCATION:   Not holding onto walker when going from sitting to standing      Assessment:   Pt did well with PABLO and completing full revolutions. Pt had some increased L knee pain with sit to stands and at times would use back of table for stability. Increased # for LAQ while maintaining control. Pt seemed to fatigue quickly with exercises and requested to be done a little early due to fatigue and slight increase in knee pain.    Plan:   Increase B LE strength

## 2024-11-06 ENCOUNTER — TREATMENT (OUTPATIENT)
Dept: PHYSICAL THERAPY | Facility: CLINIC | Age: 79
End: 2024-11-06
Payer: MEDICARE

## 2024-11-06 DIAGNOSIS — R53.1 WEAKNESS: Primary | ICD-10-CM

## 2024-11-06 PROCEDURE — 97112 NEUROMUSCULAR REEDUCATION: CPT | Mod: GP,CQ

## 2024-11-06 PROCEDURE — 97110 THERAPEUTIC EXERCISES: CPT | Mod: GP,CQ

## 2024-11-07 NOTE — PROGRESS NOTES
"Physical Therapy Treatment    Patient Name: Chas Melgar  MRN: [id number]  Today's Date: 11/8/2024  Time Calculation  Start Time: 0949  Stop Time: 1028  Time Calculation (min): 39 min     PT Therapeutic Procedures Time Entry  Therapeutic Exercise Time Entry: 39                 Current Problem  1. Weakness  Follow Up In Physical Therapy            Insurance:  COPAY 0 (MET)  Visit number: 7 of 9  Authorization info: No Auth Req  Insurance Type: MEDICARE/ [employer] [id number] ($0 USED )       Precautions   Fall risk       Subjective   Subjective:   Pt reports that his lower L leg hurts.   Pain   2/10 L lower leg  0/10 R knee    Objective   Treatments:  PABLO 5'  Seated hip flexion 10x10s BL  LAQ 3# 2x10---  LAQ 20x  LAQ 10x10\"  Bridges 3x10  Supine SLR B 10x3  Hooklying Hip AB GTB 3x10  Sit to stand 2 x10   // bars Hr/TR 10x 2---  // bars step ups FwdB 10x ---  Seated Marches 20x BL  Seated Hip AD/AB 10x10\"       Not today:  Seated Ball squeeze/Belt push 10x10s  Seated QS 10s x 10---  Lateral  and forward weight shifts x 10 ea---  Squat to Plinth 2 x10  Static stand with narrow HENNY no UE support x 30 sec  Static stand with narrow HENNY no UE support eyes closed x 30 sec  Static stand with narrow HENNY on blue cushion x 30 sec  Static stand with narrow HENNY on blue cushion eyes closed x 30 sec    Standing march with slow return 2 x 10  (--- = NT)     OP EDUCATION:   Not holding onto walker when going from sitting to standing      Assessment:  Pt able to tolerate tx session well this date w/ no adverse effects noted from tx.  Pt compliant w/ program and appears to understand rationale for therapy.  Still requires skilled therapy for increasing strength/ROM for performing ADLs.      Plan:  D/t noted impairments . PT will cont to address deficits of strength  via therapeutic exs and modalities PRN and further assist w/ pain reduction.                    "

## 2024-11-08 ENCOUNTER — TREATMENT (OUTPATIENT)
Dept: PHYSICAL THERAPY | Facility: CLINIC | Age: 79
End: 2024-11-08
Payer: MEDICARE

## 2024-11-08 DIAGNOSIS — R53.1 WEAKNESS: ICD-10-CM

## 2024-11-08 PROCEDURE — 97110 THERAPEUTIC EXERCISES: CPT | Mod: GP | Performed by: PHYSICAL THERAPIST

## 2024-11-10 DIAGNOSIS — I10 ESSENTIAL HYPERTENSION: ICD-10-CM

## 2024-11-12 ENCOUNTER — OFFICE VISIT (OUTPATIENT)
Dept: ORTHOPEDIC SURGERY | Facility: CLINIC | Age: 79
End: 2024-11-12
Payer: MEDICARE

## 2024-11-12 DIAGNOSIS — M17.12 PRIMARY OSTEOARTHRITIS OF LEFT KNEE: Primary | ICD-10-CM

## 2024-11-12 PROCEDURE — 1125F AMNT PAIN NOTED PAIN PRSNT: CPT | Performed by: ORTHOPAEDIC SURGERY

## 2024-11-12 PROCEDURE — 99211 OFF/OP EST MAY X REQ PHY/QHP: CPT | Mod: 25 | Performed by: ORTHOPAEDIC SURGERY

## 2024-11-12 PROCEDURE — 1123F ACP DISCUSS/DSCN MKR DOCD: CPT | Performed by: ORTHOPAEDIC SURGERY

## 2024-11-12 PROCEDURE — 2500000004 HC RX 250 GENERAL PHARMACY W/ HCPCS (ALT 636 FOR OP/ED): Mod: JZ | Performed by: ORTHOPAEDIC SURGERY

## 2024-11-12 PROCEDURE — 20610 DRAIN/INJ JOINT/BURSA W/O US: CPT | Mod: LT | Performed by: ORTHOPAEDIC SURGERY

## 2024-11-12 ASSESSMENT — PAIN SCALES - GENERAL: PAINLEVEL_OUTOF10: 2

## 2024-11-12 NOTE — PROGRESS NOTES
"Physical Therapy    Discharge Summary    Discharge Summary: PT    Discharge Information: Date of discharge 11/14/24    Therapy Summary: Progressed    Discharge Status: Stable     Rehab Discharge Reason: Patient requested due to feels made enough gains w/ therapy  Physical Therapy Treatment    Patient Name: Chas Melgar  MRN: [id number]  Today's Date: 11/14/2024  Time Calculation  Start Time: 0240  Stop Time: 0315  Time Calculation (min): 35 min     PT Therapeutic Procedures Time Entry  Therapeutic Exercise Time Entry: 35                 Current Problem  1. Weakness  Follow Up In Physical Therapy              Insurance:  COPAY 0 (MET)  Visit number: 8 of 9  Authorization info: No Auth Req  Insurance Type: MEDICARE/ [id number] ($0 USED )       Precautions   Fall risk       Subjective   Subjective:   Pt reports that his left knee hurts, but he is ready to DC from PT.  Pain  Pain Assessment: 0-10  0-10 (Numeric) Pain Score: 5 - Moderate pain  Pain Location: Knee  Pain Orientation: Left  Pain Descriptors: Aching    Objective   Other Measures  Lower Extremity Funtional Score (LEFS): 23        Knee ROM (degrees) -  L knee Extension: -10 AROM   L knee Flexion: 125 AROM        Knee MMT (/5) -   L knee Extension: 5  L knee Flexion: 4+     Treatments:  PABLO 5'  Seated hip flexion 10x10s BL  LAQ 3# 2x10---  LAQ 20x  LAQ 10x10\"  Bridges 3x10  Supine SLR B 10x3  Hooklying Hip AB GTB 3x10  Sit to stand 2 x10   // bars Hr/TR 10x 2---  // bars step ups FwdB 10x ---  Supine Marches GTB 3x10  Seated Hip AD/AB 10x10\"      Not today:  Seated Ball squeeze/Belt push 10x10s  Seated QS 10s x 10---  Lateral  and forward weight shifts x 10 ea---  Squat to Plinth 2 x10  Static stand with narrow HENNY no UE support x 30 sec  Static stand with narrow HENNY no UE support eyes closed x 30 sec  Static stand with narrow HENNY on blue cushion x 30 sec  Static stand with narrow HENNY on blue cushion eyes closed x 30 sec    Standing march with slow " return 2 x 10  (--- = NT)     OP EDUCATION:   Not holding onto walker when going from sitting to standing    Goals:   Increase Dyn Standing Balance to G/N  Increase ROM to WFL of L knee-MET  Increase Strength where deficits noted by 1/3 to 1 mm grade of L knee-MET  Ind w/ HEP to expedite progress and promote goal achievement of L knee-MET   Decrease Outcome score to  evidence of improved function of L knee-NM  Return to PLOF of L knee-MET  Decrease pain to 2/10 or less of L knee-NM    Assessment:  Pt achieved all goals and is ready for DC from PT at this time.  Overall demonstrated good gains w/ therapy intervention.     Plan:  Pt to be Dc'd from PT this date and follow up w/ MD PRN or as scheduled.

## 2024-11-12 NOTE — PROGRESS NOTES
Subjective    Patient ID: Chas Melgar    Chief Complaint   Patient presents with    Left Knee - Injections     Gelsyn #2       Inj/Asp: Left knee on 11/12/2024 9:47 AM  Indications: pain  Details: 22 G needle, anteromedial approach  Medications: 2 mL sodium hyaluronate 16.8 mg/2 mL  Outcome: tolerated well, no immediate complications  Procedure, treatment alternatives, risks and benefits explained, specific risks discussed. Consent was given by the patient. Immediately prior to procedure a time out was called to verify the correct patient, procedure, equipment, support staff and site/side marked as required. Patient was prepped and draped in the usual sterile fashion.         Most recent  Radiographs of the LEFT knee were reviewed and revealed degenerative joint changes.  Patient has failed conservative management as outlined below:  Continued pain 6 weeks after last steroid injection. Last steroid injection done on 9/24/24  Continued pain after 6 weeks with anti-inflammatory and/or analgesic medication(s). Dosage and daily intake: Ibuprofen 600-800 mg three times daily (or alternative NSAID equivalent) and Acetaminophen 1,000 mg three times daily  Continued pain after 6 weeks with physician-directed daily activity modification and/or physical therapy

## 2024-11-13 ENCOUNTER — APPOINTMENT (OUTPATIENT)
Dept: HEMATOLOGY/ONCOLOGY | Facility: CLINIC | Age: 79
End: 2024-11-13
Payer: MEDICARE

## 2024-11-13 RX ORDER — HYDROCHLOROTHIAZIDE 25 MG/1
25 TABLET ORAL DAILY
Qty: 90 TABLET | Refills: 1 | Status: SHIPPED | OUTPATIENT
Start: 2024-11-13

## 2024-11-14 ENCOUNTER — TREATMENT (OUTPATIENT)
Dept: PHYSICAL THERAPY | Facility: CLINIC | Age: 79
End: 2024-11-14
Payer: MEDICARE

## 2024-11-14 ENCOUNTER — APPOINTMENT (OUTPATIENT)
Dept: NEPHROLOGY | Facility: CLINIC | Age: 79
End: 2024-11-14
Payer: MEDICARE

## 2024-11-14 VITALS
BODY MASS INDEX: 27.8 KG/M2 | HEIGHT: 66 IN | SYSTOLIC BLOOD PRESSURE: 113 MMHG | DIASTOLIC BLOOD PRESSURE: 67 MMHG | WEIGHT: 173 LBS | HEART RATE: 101 BPM

## 2024-11-14 DIAGNOSIS — R80.8 OTHER PROTEINURIA: ICD-10-CM

## 2024-11-14 DIAGNOSIS — R53.1 WEAKNESS: ICD-10-CM

## 2024-11-14 DIAGNOSIS — E08.22 DIABETES MELLITUS DUE TO UNDERLYING CONDITION WITH DIABETIC CHRONIC KIDNEY DISEASE, UNSPECIFIED CKD STAGE, UNSPECIFIED WHETHER LONG TERM INSULIN USE: Primary | ICD-10-CM

## 2024-11-14 DIAGNOSIS — N18.32 STAGE 3B CHRONIC KIDNEY DISEASE (MULTI): ICD-10-CM

## 2024-11-14 DIAGNOSIS — E21.3 HYPERPARATHYROIDISM (MULTI): ICD-10-CM

## 2024-11-14 DIAGNOSIS — I10 ESSENTIAL HYPERTENSION: ICD-10-CM

## 2024-11-14 PROCEDURE — 3074F SYST BP LT 130 MM HG: CPT | Performed by: INTERNAL MEDICINE

## 2024-11-14 PROCEDURE — 97110 THERAPEUTIC EXERCISES: CPT | Mod: GP | Performed by: PHYSICAL THERAPIST

## 2024-11-14 PROCEDURE — 3078F DIAST BP <80 MM HG: CPT | Performed by: INTERNAL MEDICINE

## 2024-11-14 PROCEDURE — 1036F TOBACCO NON-USER: CPT | Performed by: INTERNAL MEDICINE

## 2024-11-14 PROCEDURE — 1159F MED LIST DOCD IN RCRD: CPT | Performed by: INTERNAL MEDICINE

## 2024-11-14 PROCEDURE — 1123F ACP DISCUSS/DSCN MKR DOCD: CPT | Performed by: INTERNAL MEDICINE

## 2024-11-14 PROCEDURE — 99214 OFFICE O/P EST MOD 30 MIN: CPT | Performed by: INTERNAL MEDICINE

## 2024-11-14 ASSESSMENT — PAIN DESCRIPTION - DESCRIPTORS: DESCRIPTORS: ACHING

## 2024-11-14 ASSESSMENT — PAIN SCALES - GENERAL: PAINLEVEL_OUTOF10: 5 - MODERATE PAIN

## 2024-11-14 ASSESSMENT — PAIN - FUNCTIONAL ASSESSMENT: PAIN_FUNCTIONAL_ASSESSMENT: 0-10

## 2024-11-14 NOTE — PROGRESS NOTES
"Chas Melgar   79 y.o.    @@  [facility]/Room: [id number]/Room/bed info not found    Subjective:   The patient is being seen for a routine clinic follow-up of chronic kidney disease. Recently, the disease has been stable. Disease complications:  No hyperkalemia, no hypocalcemia, no hyperphosphatemia, no metabolic acidosis, no coagulopathy, no uremic encephalopathy, no neuropathy and no renal osteodystrophy. The patient is currently asymptomatic. No associated symptoms are reported.       Meds:   Current Outpatient Medications   Medication Sig Dispense Refill    alfuzosin (Uroxatral) 10 mg 24 hr tablet Take 1 tablet (10 mg) by mouth early in the morning..      aspirin 81 mg EC tablet Take 1 tablet (81 mg) by mouth once daily.      BD Ultra-Fine Short Pen Needle 31 gauge x 5/16\" needle USE ONCE DAILY 100 each 3    calcitriol (Rocaltrol) 0.25 mcg capsule Take 1 capsule (0.25 mcg) by mouth every other day. 45 capsule 3    cyanocobalamin (Vitamin B-12) 1,000 mcg tablet Take 2 tablets (2,000 mcg) by mouth once daily.      dilTIAZem CD (Cardizem CD) 240 mg 24 hr capsule TAKE 1 CAPSULE BY MOUTH DAILY 270 capsule 0    Farxiga 10 mg Take 1 tablet (10 mg) by mouth once daily in the morning. Take before meals. 90 tablet 3    folic acid 0.8 mg capsule Take 1 tablet by mouth once daily at bedtime. (Patient taking differently: Take 1 tablet by mouth once daily in the morning.)      glimepiride (Amaryl) 4 mg tablet Take 1 tablet (4 mg) by mouth 2 times a day. 180 tablet 1    hydroCHLOROthiazide (HYDRODiuril) 25 mg tablet TAKE 1 TABLET BY MOUTH ONCE DAILY 90 tablet 1    insulin glargine (Basaglar KwikPen U-100 Insulin) 100 unit/mL (3 mL) pen Inject 20 Units under the skin once daily at bedtime. 15 mL 1    isosorbide mononitrate ER (Imdur) 30 mg 24 hr tablet TAKE 1 TABLET BY MOUTH DAILY 270 tablet 0    lisinopril 30 mg tablet TAKE 1 TABLET BY MOUTH ONCE DAILY 90 tablet 1    nitroglycerin (Nitrostat) 0.4 mg SL tablet Place 1 tablet " (0.4 mg) under the tongue every 5 minutes if needed for chest pain. 25 tablet 5    pantoprazole (ProtoNix) 40 mg EC tablet Take 1 tablet (40 mg) by mouth once daily. 90 tablet 3    simvastatin (Zocor) 20 mg tablet TAKE 1 TABLET BY MOUTH EVERY DAY AT BEDTIME 180 tablet 0    tamsulosin (Flomax) 0.4 mg 24 hr capsule Take 1 capsule (0.4 mg) by mouth once daily.       No current facility-administered medications for this visit.          ROS:  The patient is awake and oriented. No dizziness or lightheadedness. No chills and no fever. No headaches. No nausea and no vomiting. No shortness of breath. No cough. No sputum. No chest pain. No chest tightness. No abdominal pain. No diarrhea and no constipation. No hematemesis or hemoptysis. No hematuria. No rectal bleeding. No melena. No epistaxis. No urinary symptoms. No flank pain. No leg edema. No leg pain. No weakness. No itching. Overall, the rest of the review of systems is also negative.  12 point review of systems otherwise negative as stated in HPI.        Physical Examination:        Vitals:    11/14/24 1022   BP: 113/67   Pulse: 101     General: The patient is awake, oriented, and is not in any distress.  Head and Neck: Normocephalic. No periorbital edema.  Eyes: non-icteric  Respiratory: Symmetric air entry. Symmetric chest expansion.No respiratory distress.  Skin: No maculopapular rash.  Musculoskeletal: No peripheral edema in both left and right upper extremities.  No edema in either left or right lower extremities.  Neuro Exam: Speech is fluent. Moves extremities.    Imaging:  === 06/26/24 ===    US RENAL COMPLETE    - Impression -  Bilateral renal cysts.    Marked prostatomegaly.    Known right renal mass not well visualized sonographically.    Signed by: Bernice Garcias 6/27/2024 1:48 PM  Dictation workstation:   [id number]       Blood Labs:  No results found for this or any previous visit (from the past 24 hours).   Lab Results   Component Value Date    PTH  40.2 08/21/2024    PROTUR 66 (H) 06/30/2023    PHOS 4.0 06/30/2023      Lab Results   Component Value Date    GLUCOSE 254 (H) 11/04/2024    CALCIUM 9.6 11/04/2024     11/04/2024    K 5.0 11/04/2024    CO2 24 11/04/2024     11/04/2024    BUN 41 (H) 11/04/2024    CREATININE 1.92 (H) 11/04/2024         Assessment and Plan:  1. Chronic kidney disease stage III. Last creatinine level is 1.92. Normal potassium and bicarb level.      2. Hypertension. Blood pressure is under control. Continue the current medications.     3. Proteinuria. He has about 330 mg proteinuria. It is most likely because of diabetic nephropathy. He is on lisinopril and Farxigas.  He asked me to his his Farxiga to Jardiance since his insurance covers Jardiance.    4.  Secondary hyperparathyroidism.  On calcitriol.  PTH level will be checked before the next appointment.           I will see him in about 4 months for follow-up.           Siddhartha Burnett MD  Senior Attending Physician  Director of Onco-Nephrology Program  Division of Nephrology & Hypertension  [facility]

## 2024-11-15 ENCOUNTER — HOSPITAL ENCOUNTER (OUTPATIENT)
Dept: RADIOLOGY | Facility: HOSPITAL | Age: 79
Discharge: HOME | End: 2024-11-15
Payer: MEDICARE

## 2024-11-15 DIAGNOSIS — E04.1 THYROID NODULE: ICD-10-CM

## 2024-11-15 PROCEDURE — 76536 US EXAM OF HEAD AND NECK: CPT

## 2024-11-15 PROCEDURE — 76536 US EXAM OF HEAD AND NECK: CPT | Performed by: RADIOLOGY

## 2024-11-19 ENCOUNTER — OFFICE VISIT (OUTPATIENT)
Dept: ORTHOPEDIC SURGERY | Facility: CLINIC | Age: 79
End: 2024-11-19
Payer: MEDICARE

## 2024-11-19 DIAGNOSIS — M17.12 PRIMARY OSTEOARTHRITIS OF LEFT KNEE: Primary | ICD-10-CM

## 2024-11-19 PROCEDURE — 2500000004 HC RX 250 GENERAL PHARMACY W/ HCPCS (ALT 636 FOR OP/ED): Mod: JZ | Performed by: ORTHOPAEDIC SURGERY

## 2024-11-19 PROCEDURE — 1123F ACP DISCUSS/DSCN MKR DOCD: CPT | Performed by: ORTHOPAEDIC SURGERY

## 2024-11-19 PROCEDURE — 99211 OFF/OP EST MAY X REQ PHY/QHP: CPT | Mod: 25 | Performed by: ORTHOPAEDIC SURGERY

## 2024-11-19 PROCEDURE — 20610 DRAIN/INJ JOINT/BURSA W/O US: CPT | Mod: LT | Performed by: ORTHOPAEDIC SURGERY

## 2024-11-19 NOTE — PROGRESS NOTES
Subjective    Patient ID: Chas Melgar    Chief Complaint   Patient presents with    Left Knee - Injections     Gelsyn #3       Inj/Asp: Left knee on 11/19/2024 9:32 AM  Indications: pain  Details: 22 G needle, anteromedial approach  Medications: 2 mL sodium hyaluronate 16.8 mg/2 mL  Outcome: tolerated well, no immediate complications  Procedure, treatment alternatives, risks and benefits explained, specific risks discussed. Consent was given by the patient. Immediately prior to procedure a time out was called to verify the correct patient, procedure, equipment, support staff and site/side marked as required. Patient was prepped and draped in the usual sterile fashion.         Most recent  Radiographs of the LEFT knee were reviewed and revealed degenerative joint changes.  Patient has failed conservative management as outlined below:  Continued pain 6 weeks after last steroid injection. Last steroid injection done on 9/24/24  Continued pain after 6 weeks with anti-inflammatory and/or analgesic medication(s). Dosage and daily intake: Ibuprofen 600-800 mg three times daily (or alternative NSAID equivalent) and Acetaminophen 1,000 mg three times daily  Continued pain after 6 weeks with physician-directed daily activity modification and/or physical therapy

## 2024-11-25 ENCOUNTER — OFFICE VISIT (OUTPATIENT)
Dept: HEMATOLOGY/ONCOLOGY | Facility: CLINIC | Age: 79
End: 2024-11-25
Payer: MEDICARE

## 2024-11-25 ENCOUNTER — INFUSION (OUTPATIENT)
Dept: HEMATOLOGY/ONCOLOGY | Facility: CLINIC | Age: 79
End: 2024-11-25
Payer: MEDICARE

## 2024-11-25 ENCOUNTER — LAB (OUTPATIENT)
Dept: LAB | Facility: CLINIC | Age: 79
End: 2024-11-25
Payer: MEDICARE

## 2024-11-25 VITALS
OXYGEN SATURATION: 95 % | SYSTOLIC BLOOD PRESSURE: 119 MMHG | HEART RATE: 74 BPM | BODY MASS INDEX: 27.79 KG/M2 | WEIGHT: 172.18 LBS | DIASTOLIC BLOOD PRESSURE: 57 MMHG | RESPIRATION RATE: 16 BRPM | TEMPERATURE: 96.8 F

## 2024-11-25 DIAGNOSIS — E78.2 MIXED HYPERLIPIDEMIA: ICD-10-CM

## 2024-11-25 DIAGNOSIS — E08.65 DIABETES MELLITUS DUE TO UNDERLYING CONDITION WITH HYPERGLYCEMIA, WITH LONG-TERM CURRENT USE OF INSULIN: ICD-10-CM

## 2024-11-25 DIAGNOSIS — Z79.4 DIABETES MELLITUS DUE TO UNDERLYING CONDITION WITH HYPERGLYCEMIA, WITH LONG-TERM CURRENT USE OF INSULIN: ICD-10-CM

## 2024-11-25 DIAGNOSIS — D46.Z MDS (MYELODYSPLASTIC SYNDROME), LOW GRADE (MULTI): ICD-10-CM

## 2024-11-25 DIAGNOSIS — E83.110 HEREDITARY HEMOCHROMATOSIS (CMS-HCC): ICD-10-CM

## 2024-11-25 DIAGNOSIS — D46.1 REFRACTORY ANEMIA WITH RING SIDEROBLASTS (MULTI): Primary | ICD-10-CM

## 2024-11-25 DIAGNOSIS — I10 ESSENTIAL HYPERTENSION: ICD-10-CM

## 2024-11-25 DIAGNOSIS — I73.9 PVD (PERIPHERAL VASCULAR DISEASE) (CMS-HCC): ICD-10-CM

## 2024-11-25 DIAGNOSIS — D46.1 REFRACTORY ANEMIA WITH RING SIDEROBLASTS (MULTI): ICD-10-CM

## 2024-11-25 LAB
ALBUMIN SERPL BCP-MCNC: 4.2 G/DL (ref 3.4–5)
ALP SERPL-CCNC: 72 U/L (ref 33–136)
ALT SERPL W P-5'-P-CCNC: 14 U/L (ref 10–52)
ANION GAP SERPL CALC-SCNC: 15 MMOL/L (ref 10–20)
AST SERPL W P-5'-P-CCNC: 11 U/L (ref 9–39)
BASOPHILS # BLD AUTO: 0.11 X10*3/UL (ref 0–0.1)
BASOPHILS NFR BLD AUTO: 1.7 %
BILIRUB SERPL-MCNC: 1 MG/DL (ref 0–1.2)
BUN SERPL-MCNC: 34 MG/DL (ref 6–23)
CALCIUM SERPL-MCNC: 9.1 MG/DL (ref 8.6–10.3)
CHLORIDE SERPL-SCNC: 102 MMOL/L (ref 98–107)
CO2 SERPL-SCNC: 24 MMOL/L (ref 21–32)
CREAT SERPL-MCNC: 1.81 MG/DL (ref 0.5–1.3)
EGFRCR SERPLBLD CKD-EPI 2021: 37 ML/MIN/1.73M*2
EOSINOPHIL # BLD AUTO: 0.5 X10*3/UL (ref 0–0.4)
EOSINOPHIL NFR BLD AUTO: 7.5 %
ERYTHROCYTE [DISTWIDTH] IN BLOOD BY AUTOMATED COUNT: 20.9 % (ref 11.5–14.5)
GLUCOSE SERPL-MCNC: 351 MG/DL (ref 74–99)
HCT VFR BLD AUTO: 30.9 % (ref 41–52)
HGB BLD-MCNC: 10.3 G/DL (ref 13.5–17.5)
HOLD SPECIMEN: NORMAL
IMM GRANULOCYTES # BLD AUTO: 0.04 X10*3/UL (ref 0–0.5)
IMM GRANULOCYTES NFR BLD AUTO: 0.6 % (ref 0–0.9)
LYMPHOCYTES # BLD AUTO: 1.59 X10*3/UL (ref 0.8–3)
LYMPHOCYTES NFR BLD AUTO: 24 %
MCH RBC QN AUTO: 37.1 PG (ref 26–34)
MCHC RBC AUTO-ENTMCNC: 33.3 G/DL (ref 32–36)
MCV RBC AUTO: 111 FL (ref 80–100)
MONOCYTES # BLD AUTO: 0.67 X10*3/UL (ref 0.05–0.8)
MONOCYTES NFR BLD AUTO: 10.1 %
NEUTROPHILS # BLD AUTO: 3.72 X10*3/UL (ref 1.6–5.5)
NEUTROPHILS NFR BLD AUTO: 56.1 %
NRBC BLD-RTO: ABNORMAL /100{WBCS}
PLATELET # BLD AUTO: 163 X10*3/UL (ref 150–450)
POTASSIUM SERPL-SCNC: 4.7 MMOL/L (ref 3.5–5.3)
PROT SERPL-MCNC: 6.7 G/DL (ref 6.4–8.2)
RBC # BLD AUTO: 2.78 X10*6/UL (ref 4.5–5.9)
SODIUM SERPL-SCNC: 136 MMOL/L (ref 136–145)
WBC # BLD AUTO: 6.6 X10*3/UL (ref 4.4–11.3)

## 2024-11-25 PROCEDURE — 99214 OFFICE O/P EST MOD 30 MIN: CPT | Performed by: INTERNAL MEDICINE

## 2024-11-25 PROCEDURE — 1159F MED LIST DOCD IN RCRD: CPT | Performed by: INTERNAL MEDICINE

## 2024-11-25 PROCEDURE — 1125F AMNT PAIN NOTED PAIN PRSNT: CPT | Performed by: INTERNAL MEDICINE

## 2024-11-25 PROCEDURE — 96372 THER/PROPH/DIAG INJ SC/IM: CPT

## 2024-11-25 PROCEDURE — 3078F DIAST BP <80 MM HG: CPT | Performed by: INTERNAL MEDICINE

## 2024-11-25 PROCEDURE — 3074F SYST BP LT 130 MM HG: CPT | Performed by: INTERNAL MEDICINE

## 2024-11-25 PROCEDURE — 1123F ACP DISCUSS/DSCN MKR DOCD: CPT | Performed by: INTERNAL MEDICINE

## 2024-11-25 PROCEDURE — G2211 COMPLEX E/M VISIT ADD ON: HCPCS | Performed by: INTERNAL MEDICINE

## 2024-11-25 PROCEDURE — 2500000004 HC RX 250 GENERAL PHARMACY W/ HCPCS (ALT 636 FOR OP/ED): Mod: JW,JG | Performed by: INTERNAL MEDICINE

## 2024-11-25 PROCEDURE — 80053 COMPREHEN METABOLIC PANEL: CPT

## 2024-11-25 PROCEDURE — 36415 COLL VENOUS BLD VENIPUNCTURE: CPT

## 2024-11-25 PROCEDURE — 85025 COMPLETE CBC W/AUTO DIFF WBC: CPT

## 2024-11-25 RX ORDER — DIPHENHYDRAMINE HYDROCHLORIDE 50 MG/ML
50 INJECTION INTRAMUSCULAR; INTRAVENOUS AS NEEDED
Status: DISCONTINUED | OUTPATIENT
Start: 2024-11-25 | End: 2024-11-25 | Stop reason: HOSPADM

## 2024-11-25 RX ORDER — DIPHENHYDRAMINE HYDROCHLORIDE 50 MG/ML
50 INJECTION INTRAMUSCULAR; INTRAVENOUS AS NEEDED
OUTPATIENT
Start: 2024-12-16

## 2024-11-25 RX ORDER — ALBUTEROL SULFATE 0.83 MG/ML
3 SOLUTION RESPIRATORY (INHALATION) AS NEEDED
Status: DISCONTINUED | OUTPATIENT
Start: 2024-11-25 | End: 2024-11-25 | Stop reason: HOSPADM

## 2024-11-25 RX ORDER — ALBUTEROL SULFATE 0.83 MG/ML
3 SOLUTION RESPIRATORY (INHALATION) AS NEEDED
OUTPATIENT
Start: 2024-12-16

## 2024-11-25 RX ORDER — EPINEPHRINE 0.3 MG/.3ML
0.3 INJECTION SUBCUTANEOUS EVERY 5 MIN PRN
Status: DISCONTINUED | OUTPATIENT
Start: 2024-11-25 | End: 2024-11-25 | Stop reason: HOSPADM

## 2024-11-25 RX ORDER — FAMOTIDINE 10 MG/ML
20 INJECTION INTRAVENOUS ONCE AS NEEDED
Status: DISCONTINUED | OUTPATIENT
Start: 2024-11-25 | End: 2024-11-25 | Stop reason: HOSPADM

## 2024-11-25 RX ORDER — FAMOTIDINE 10 MG/ML
20 INJECTION INTRAVENOUS ONCE AS NEEDED
OUTPATIENT
Start: 2024-12-16

## 2024-11-25 RX ORDER — EPINEPHRINE 0.3 MG/.3ML
0.3 INJECTION SUBCUTANEOUS EVERY 5 MIN PRN
OUTPATIENT
Start: 2024-12-16

## 2024-11-25 ASSESSMENT — PAIN SCALES - GENERAL: PAINLEVEL_OUTOF10: 2

## 2024-11-25 NOTE — PROGRESS NOTES
Patient ID: Chas Melgar is a 79 y.o. male.  Referring Physician: Miki Bowser MD  [address]  [place] [address]  Primary Care Provider: Jimmy Helm MD  Visit Type: Follow Up      Subjective    HPI How are my blood counts?    Review of Systems   Constitutional: Negative.    HENT:  Negative.     Eyes: Negative.    Respiratory: Negative.     Cardiovascular: Negative.    Gastrointestinal: Negative.    Endocrine: Negative.    Genitourinary: Negative.     Musculoskeletal: Negative.    Skin: Negative.    Neurological: Negative.    Hematological: Negative.    Psychiatric/Behavioral: Negative.          Objective   BSA: 1.91 meters squared  /57 (BP Location: Right arm, Patient Position: Sitting, BP Cuff Size: Adult)   Pulse 74   Temp 36 °C (96.8 °F) (Temporal)   Resp 16   Wt 78.1 kg (172 lb 2.9 oz)   SpO2 95%   BMI 27.79 kg/m²      has a past medical history of Acute gastric ulcer with hemorrhage (09/07/2022), Acute upper respiratory infection, unspecified, Arthritis, Body mass index (BMI) 28.0-28.9, adult (10/19/2021), Cancer (Multi), Coronary artery disease, Diabetes mellitus (Multi), Encounter for other preprocedural examination (10/14/2021), Encounter for screening for malignant neoplasm of prostate, Impingement syndrome of unspecified shoulder (07/22/2021), Other abnormalities of breathing, 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[facility]), Other specified follicular disorders, Other specified postprocedural states (06/09/2021), Overweight (02/21/2022), Overweight (01/18/2022), Pain in right hip, Pain in right leg, Pain in unspecified hip (08/04/2021), Pain in unspecified shoulder (08/10/2021), Personal history of malignant neoplasm, unspecified (08/04/2021), Personal history of other diseases of male genital organs, Personal history of other diseases of the circulatory system (08/04/2021), Personal history of other diseases of the circulatory system  (08/04/2021), Personal history of other diseases of the digestive system (10/14/2021), Personal history of other diseases of the digestive system (02/26/2022), Personal history of other diseases of the digestive system, Personal history of other diseases of the musculoskeletal system and connective tissue (08/04/2021), Personal history of other diseases of the musculoskeletal system and connective tissue (08/05/2021), Personal history of other diseases of the respiratory system (11/17/2021), Personal history of other diseases of the respiratory system, Personal history of other diseases of the respiratory system, Personal history of other diseases of the respiratory system, Personal history of other diseases of urinary system (09/01/2022), Personal history of other endocrine, nutritional and metabolic disease (10/06/2021), Personal history of other endocrine, nutritional and metabolic disease (01/18/2022), Personal history of other malignant neoplasm of skin, Personal history of other specified conditions (09/01/2022), Personal history of other specified conditions, Personal history of other specified conditions, Persons encountering health services in other specified circumstances, Primary osteoarthritis, 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   has a past surgical history that includes Other surgical history (11/07/2022); Other surgical history (10/14/2021); Other surgical history (10/14/2021); Other surgical history (10/14/2021); Other surgical history (10/14/2021); Other surgical history (10/14/2021); Other surgical history (11/17/2021); CT angio aorta and bilateral iliofemoral runoff including without contrast if performed (10/27/2020); and Rotator cuff  repair.  Family History   Problem Relation Name Age of Onset    Diabetes Mother Melinda melgar     Coronary artery disease Father      Hypertension Other      Alzheimer's disease Other       Oncology History    No history exists.       Chas Melgar  reports that he quit smoking about 38 years ago. His smoking use included cigarettes. He started smoking about 58 years ago. He has a 40 pack-year smoking history. He has never used smokeless tobacco.  He  reports current alcohol use of about 2.0 standard drinks of alcohol per week.  He  reports no history of drug use.    Physical Exam  Vitals reviewed.   Constitutional:       Appearance: Normal appearance.   HENT:      Head: Normocephalic.      Mouth/Throat:      Mouth: Mucous membranes are moist.   Eyes:      Extraocular Movements: Extraocular movements intact.      Pupils: Pupils are equal, round, and reactive to light.   Cardiovascular:      Rate and Rhythm: Normal rate and regular rhythm.      Pulses: Normal pulses.      Heart sounds: Normal heart sounds.   Pulmonary:      Effort: Pulmonary effort is normal.      Breath sounds: Normal breath sounds.   Abdominal:      General: Bowel sounds are normal.      Palpations: Abdomen is soft.   Musculoskeletal:         General: Normal range of motion.      Cervical back: Normal range of motion and neck supple.   Skin:     General: Skin is warm.   Neurological:      General: No focal deficit present.      Mental Status: He is alert and oriented to person, place, and time.   Psychiatric:         Mood and Affect: Mood normal.         Behavior: Behavior normal.         WBC   Date/Time Value Ref Range Status   11/25/2024 12:22 PM 6.6 4.4 - 11.3 x10*3/uL Final   11/04/2024 11:00 AM 12.3 (H) 4.4 - 11.3 x10*3/uL Final   10/14/2024 10:26 AM 7.8 4.4 - 11.3 x10*3/uL Final     [facility]   Date Value Ref Range Status   11/25/2024   Final     Comment:     Not Measured   11/04/2024   Final     Comment:     Not Measured   09/23/2024    Final     Comment:     Not Measured     RBC   Date Value Ref Range Status   11/25/2024 2.78 (L) 4.50 - 5.90 x10*6/uL Final   11/04/2024 2.97 (L) 4.50 - 5.90 x10*6/uL Final   10/14/2024 2.74 (L) 4.50 - 5.90 x10*6/uL Final     Hemoglobin   Date Value Ref Range Status   11/25/2024 10.3 (L) 13.5 - 17.5 g/dL Final   11/04/2024 11.1 (L) 13.5 - 17.5 g/dL Final   10/14/2024 10.6 (L) 13.5 - 17.5 g/dL Final     Hematocrit   Date Value Ref Range Status   11/25/2024 30.9 (L) 41.0 - 52.0 % Final   11/04/2024 33.5 (L) 41.0 - 52.0 % Final   10/14/2024 30.9 (L) 41.0 - 52.0 % Final     MCV   Date/Time Value Ref Range Status   11/25/2024 12:22  (H) 80 - 100 fL Final   11/04/2024 11:00  (H) 80 - 100 fL Final   10/14/2024 10:26  (H) 80 - 100 fL Final     MCH   Date/Time Value Ref Range Status   11/25/2024 12:22 PM 37.1 (H) 26.0 - 34.0 pg Final   11/04/2024 11:00 AM 37.4 (H) 26.0 - 34.0 pg Final   10/14/2024 10:26 AM 38.7 (H) 26.0 - 34.0 pg Final     MCHC   Date/Time Value Ref Range Status   11/25/2024 12:22 PM 33.3 32.0 - 36.0 g/dL Final   11/04/2024 11:00 AM 33.1 32.0 - 36.0 g/dL Final   10/14/2024 10:26 AM 34.3 32.0 - 36.0 g/dL Final     RDW   Date/Time Value Ref Range Status   11/25/2024 12:22 PM 20.9 (H) 11.5 - 14.5 % Final   11/04/2024 11:00 AM 21.8 (H) 11.5 - 14.5 % Final   10/14/2024 10:26 AM 22.1 (H) 11.5 - 14.5 % Final     Platelets   Date/Time Value Ref Range Status   11/25/2024 12:22  150 - 450 x10*3/uL Final   11/04/2024 11:00  150 - 450 x10*3/uL Final   10/14/2024 10:26  150 - 450 x10*3/uL Final     MPV   Date/Time Value Ref Range Status   10/23/2023 01:06 PM 14.6 (H) 7.5 - 11.5 fL Final   10/02/2023 12:34 PM 14.5 (H) 7.5 - 11.5 fL Final     Neutrophils %   Date/Time Value Ref Range Status   11/25/2024 12:22 PM 56.1 40.0 - 80.0 % Final   11/04/2024 11:00 AM 68.2 40.0 - 80.0 % Final   10/14/2024 10:26 AM 58.2 40.0 - 80.0 % Final     Immature Granulocytes %, Automated   Date/Time Value Ref  Range Status   11/25/2024 12:22 PM 0.6 0.0 - 0.9 % Final     Comment:     Immature Granulocyte Count (IG) includes promyelocytes, myelocytes and metamyelocytes but does not include bands. Percent differential counts (%) should be interpreted in the context of the absolute cell counts (cells/UL).   11/04/2024 11:00 AM 0.3 0.0 - 0.9 % Final     Comment:     Immature Granulocyte Count (IG) includes promyelocytes, myelocytes and metamyelocytes but does not include bands. Percent differential counts (%) should be interpreted in the context of the absolute cell counts (cells/UL).   10/14/2024 10:26 AM 0.5 0.0 - 0.9 % Final     Comment:     Immature Granulocyte Count (IG) includes promyelocytes, myelocytes and metamyelocytes but does not include bands. Percent differential counts (%) should be interpreted in the context of the absolute cell counts (cells/UL).     Lymphocytes %   Date/Time Value Ref Range Status   11/25/2024 12:22 PM 24.0 13.0 - 44.0 % Final   11/04/2024 11:00 AM 16.1 13.0 - 44.0 % Final   10/14/2024 10:26 AM 21.5 13.0 - 44.0 % Final     Monocytes %   Date/Time Value Ref Range Status   11/25/2024 12:22 PM 10.1 2.0 - 10.0 % Final   11/04/2024 11:00 AM 8.4 2.0 - 10.0 % Final   10/14/2024 10:26 AM 10.0 2.0 - 10.0 % Final     Eosinophils %   Date/Time Value Ref Range Status   11/25/2024 12:22 PM 7.5 0.0 - 6.0 % Final   11/04/2024 11:00 AM 6.0 0.0 - 6.0 % Final   10/14/2024 10:26 AM 8.1 0.0 - 6.0 % Final     Basophils %   Date/Time Value Ref Range Status   11/25/2024 12:22 PM 1.7 0.0 - 2.0 % Final   11/04/2024 11:00 AM 1.0 0.0 - 2.0 % Final   10/14/2024 10:26 AM 1.7 0.0 - 2.0 % Final     Neutrophils Absolute   Date/Time Value Ref Range Status   11/25/2024 12:22 PM 3.72 1.60 - 5.50 x10*3/uL Final     Comment:     Percent differential counts (%) should be interpreted in the context of the absolute cell counts (cells/uL).   11/04/2024 11:00 AM 8.36 (H) 1.60 - 5.50 x10*3/uL Final     Comment:     Percent  "differential counts (%) should be interpreted in the context of the absolute cell counts (cells/uL).   10/14/2024 10:26 AM 4.54 1.60 - 5.50 x10*3/uL Final     Comment:     Percent differential counts (%) should be interpreted in the context of the absolute cell counts (cells/uL).     Immature Granulocytes Absolute, Automated   Date/Time Value Ref Range Status   11/25/2024 12:22 PM 0.04 0.00 - 0.50 x10*3/uL Final   11/04/2024 11:00 AM 0.04 0.00 - 0.50 x10*3/uL Final   10/14/2024 10:26 AM 0.04 0.00 - 0.50 x10*3/uL Final     Lymphocytes Absolute   Date/Time Value Ref Range Status   11/25/2024 12:22 PM 1.59 0.80 - 3.00 x10*3/uL Final   11/04/2024 11:00 AM 1.97 0.80 - 3.00 x10*3/uL Final   10/14/2024 10:26 AM 1.68 0.80 - 3.00 x10*3/uL Final     Monocytes Absolute   Date/Time Value Ref Range Status   11/25/2024 12:22 PM 0.67 0.05 - 0.80 x10*3/uL Final   11/04/2024 11:00 AM 1.03 (H) 0.05 - 0.80 x10*3/uL Final   10/14/2024 10:26 AM 0.78 0.05 - 0.80 x10*3/uL Final     Eosinophils Absolute   Date/Time Value Ref Range Status   11/25/2024 12:22 PM 0.50 (H) 0.00 - 0.40 x10*3/uL Final   11/04/2024 11:00 AM 0.74 (H) 0.00 - 0.40 x10*3/uL Final   10/14/2024 10:26 AM 0.63 (H) 0.00 - 0.40 x10*3/uL Final     Basophils Absolute   Date/Time Value Ref Range Status   11/25/2024 12:22 PM 0.11 (H) 0.00 - 0.10 x10*3/uL Final   11/04/2024 11:00 AM 0.12 (H) 0.00 - 0.10 x10*3/uL Final     Comment:     Automated WBC differential has been confirmed by manual smear.   10/14/2024 10:26 AM 0.13 (H) 0.00 - 0.10 x10*3/uL Final       No components found for: \"PT\"  aPTT   Date/Time Value Ref Range Status   11/21/2023 08:20 AM 31 27 - 38 seconds Final   08/22/2022 01:05 PM 30 26 - 39 sec Final     Comment:       THE APTT IS NO LONGER USED FOR MONITORING     UNFRACTIONATED HEPARIN THERAPY.    FOR MONITORING HEPARIN THERAPY,     USE THE HEPARIN ASSAY.     08/21/2021 07:08 AM 24 (L) 25 - 35 sec Final     Comment:       THE APTT IS NO LONGER USED FOR " "MONITORING     UNFRACTIONATED HEPARIN THERAPY.    FOR MONITORING HEPARIN THERAPY,     USE THE HEPARIN ASSAY.     04/29/2021 08:43 AM 28 25 - 35 sec Final     Comment:       THE APTT IS NO LONGER USED FOR MONITORING     UNFRACTIONATED HEPARIN THERAPY.    FOR MONITORING HEPARIN THERAPY,     USE THE HEPARIN ASSAY.       Medication Documentation Review Audit       Reviewed by Manasa Garza MA (Medical Assistant) on 11/25/24 at 1238      Medication Order Taking? Sig Documenting Provider Last Dose Status   alfuzosin (Uroxatral) 10 mg 24 hr tablet [id number] Yes Take 1 tablet (10 mg) by mouth early in the morning.. Historical Provider, MD  Active   aspirin 81 mg EC tablet [id number] Yes Take 1 tablet (81 mg) by mouth once daily. Historical Provider, MD  Active   BD Ultra-Fine Short Pen Needle 31 gauge x 5/16\" needle [id number] Yes USE ONCE DAILY Jimmy Helm MD  Active   calcitriol (Rocaltrol) 0.25 mcg capsule [id number] Yes Take 1 capsule (0.25 mcg) by mouth every other day. Siddhartha Burnett MD  Active   cyanocobalamin (Vitamin B-12) 1,000 mcg tablet [id number] Yes Take 2 tablets (2,000 mcg) by mouth once daily. Historical Provider, MD  Active   dilTIAZem CD (Cardizem CD) 240 mg 24 hr capsule [id number] Yes TAKE 1 CAPSULE BY MOUTH DAILY Wilfrido Goodwin MD  Active   empagliflozin (Jardiance) 25 mg [id number]  Take 1 tablet (25 mg) by mouth once daily. Siddhartha Burnett MD  Active   folic acid 0.8 mg capsule [id number] Yes Take 1 tablet by mouth once daily at bedtime.   Patient taking differently: Take 1 tablet by mouth once daily in the morning.    Historical Provider, MD  Active   glimepiride (Amaryl) 4 mg tablet [id number] Yes Take 1 tablet (4 mg) by mouth 2 times a day. Jmimy Helm MD  Active   hydroCHLOROthiazide (HYDRODiuril) 25 mg tablet [id number] Yes TAKE 1 TABLET BY MOUTH ONCE DAILY Jimmy Helm MD  Active   insulin glargine (Basaglar KwikPen U-100 Insulin) 100 unit/mL (3 mL) pen [id number] Yes Inject 20 Units under " the skin once daily at bedtime. Jimmy Helm MD  Active   isosorbide mononitrate ER (Imdur) 30 mg 24 hr tablet [id number] Yes TAKE 1 TABLET BY MOUTH DAILY Wilfrido Goodwin MD  Active   lisinopril 30 mg tablet [id number] Yes TAKE 1 TABLET BY MOUTH ONCE DAILY Jimmy Helm MD  Active   nitroglycerin (Nitrostat) 0.4 mg SL tablet [id number] Yes Place 1 tablet (0.4 mg) under the tongue every 5 minutes if needed for chest pain. Wilfrido Goodwin MD  Active   pantoprazole (ProtoNix) 40 mg EC tablet [id number] Yes Take 1 tablet (40 mg) by mouth once daily. Dayanna Monroe MD  Active   simvastatin (Zocor) 20 mg tablet [id number] Yes TAKE 1 TABLET BY MOUTH EVERY DAY AT BEDTIME Wilfrido Goodwin MD  Active   tamsulosin (Flomax) 0.4 mg 24 hr capsule [id number] Yes Take 1 capsule (0.4 mg) by mouth once daily. Historical Provider, MD  Active                   Assessment/Plan    1) MDS/RARS  -has symptomatic anemia  -failed aranesp  -currently on luspatercept Q3 weeks  -currently being dosed at 1.75 mg/kg  -labs to be done today include CBC, COMP  -results reviewed--wbc 6.6, hgb 10.3 plt 163,000, creatinine 1.81, calcium 9.1, alk phos 72, AST 11, total bili 1.0, ALT 14  -benefits, risks, potential morbidity related to luspatercept were reviewed with Chas and he provided informed consent to prceed  -he went on to receive luspatercept 1.75 mg/kg subcutaneous  -he knows that he will not receive a dose if and when his hgb is >=11.5     2) hereditary hemochromatosis  -had been on therapeutic phlebotomy in the past, however, given current marked anemia, it has not been wise to continue with phlebotomy     3) hyperlipidemia  -on simvastatin     4) hypertension  -on hydrochlorothiazide  -on lisinopril  -on Cartia XT     5) diabetes  -on glimepiride  -on metformin  -on levemir     6) PAD  -on ASA  -s/p multiple stents        Problem List Items Addressed This Visit             ICD-10-CM    MDS (myelodysplastic syndrome), low grade (Multi)  D46.Z    Relevant Orders    Infusion Appointment Request [facility] INFUSION    Infusion Appointment Request [facility] INFUSION    Clinic Appointment Request Chemo Follow Up; [facility] BOWSER; [place] [id number]    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

## 2024-12-03 ASSESSMENT — ENCOUNTER SYMPTOMS
CONSTITUTIONAL NEGATIVE: 1
PSYCHIATRIC NEGATIVE: 1
MUSCULOSKELETAL NEGATIVE: 1
EYES NEGATIVE: 1
ENDOCRINE NEGATIVE: 1
CARDIOVASCULAR NEGATIVE: 1
NEUROLOGICAL NEGATIVE: 1
GASTROINTESTINAL NEGATIVE: 1
HEMATOLOGIC/LYMPHATIC NEGATIVE: 1
RESPIRATORY NEGATIVE: 1

## 2024-12-16 ENCOUNTER — INFUSION (OUTPATIENT)
Dept: HEMATOLOGY/ONCOLOGY | Facility: CLINIC | Age: 79
End: 2024-12-16
Payer: MEDICARE

## 2024-12-16 ENCOUNTER — LAB (OUTPATIENT)
Dept: LAB | Facility: CLINIC | Age: 79
End: 2024-12-16
Payer: MEDICARE

## 2024-12-16 VITALS
DIASTOLIC BLOOD PRESSURE: 61 MMHG | WEIGHT: 173.5 LBS | RESPIRATION RATE: 16 BRPM | OXYGEN SATURATION: 94 % | BODY MASS INDEX: 28 KG/M2 | TEMPERATURE: 97.5 F | HEART RATE: 91 BPM | SYSTOLIC BLOOD PRESSURE: 106 MMHG

## 2024-12-16 DIAGNOSIS — D46.1 REFRACTORY ANEMIA WITH RING SIDEROBLASTS (MULTI): ICD-10-CM

## 2024-12-16 DIAGNOSIS — D46.Z MDS (MYELODYSPLASTIC SYNDROME), LOW GRADE (MULTI): ICD-10-CM

## 2024-12-16 LAB
ALBUMIN SERPL BCP-MCNC: 4.5 G/DL (ref 3.4–5)
ALP SERPL-CCNC: 74 U/L (ref 33–136)
ALT SERPL W P-5'-P-CCNC: 9 U/L (ref 10–52)
ANION GAP SERPL CALC-SCNC: 17 MMOL/L (ref 10–20)
AST SERPL W P-5'-P-CCNC: 10 U/L (ref 9–39)
BASOPHILS # BLD AUTO: 0.12 X10*3/UL (ref 0–0.1)
BASOPHILS NFR BLD AUTO: 1.1 %
BILIRUB SERPL-MCNC: 1.6 MG/DL (ref 0–1.2)
BUN SERPL-MCNC: 42 MG/DL (ref 6–23)
CALCIUM SERPL-MCNC: 9.7 MG/DL (ref 8.6–10.3)
CHLORIDE SERPL-SCNC: 103 MMOL/L (ref 98–107)
CO2 SERPL-SCNC: 24 MMOL/L (ref 21–32)
CREAT SERPL-MCNC: 2.18 MG/DL (ref 0.5–1.3)
EGFRCR SERPLBLD CKD-EPI 2021: 30 ML/MIN/1.73M*2
EOSINOPHIL # BLD AUTO: 0.5 X10*3/UL (ref 0–0.4)
EOSINOPHIL NFR BLD AUTO: 4.5 %
ERYTHROCYTE [DISTWIDTH] IN BLOOD BY AUTOMATED COUNT: 22.2 % (ref 11.5–14.5)
GLUCOSE SERPL-MCNC: 168 MG/DL (ref 74–99)
HCT VFR BLD AUTO: 32.2 % (ref 41–52)
HGB BLD-MCNC: 10.8 G/DL (ref 13.5–17.5)
IMM GRANULOCYTES # BLD AUTO: 0.03 X10*3/UL (ref 0–0.5)
IMM GRANULOCYTES NFR BLD AUTO: 0.3 % (ref 0–0.9)
LYMPHOCYTES # BLD AUTO: 2.22 X10*3/UL (ref 0.8–3)
LYMPHOCYTES NFR BLD AUTO: 20.2 %
MCH RBC QN AUTO: 37.5 PG (ref 26–34)
MCHC RBC AUTO-ENTMCNC: 33.5 G/DL (ref 32–36)
MCV RBC AUTO: 112 FL (ref 80–100)
MONOCYTES # BLD AUTO: 1.29 X10*3/UL (ref 0.05–0.8)
MONOCYTES NFR BLD AUTO: 11.7 %
NEUTROPHILS # BLD AUTO: 6.83 X10*3/UL (ref 1.6–5.5)
NEUTROPHILS NFR BLD AUTO: 62.2 %
NRBC BLD-RTO: ABNORMAL /100{WBCS}
PLATELET # BLD AUTO: 182 X10*3/UL (ref 150–450)
POTASSIUM SERPL-SCNC: 4.5 MMOL/L (ref 3.5–5.3)
PROT SERPL-MCNC: 7.1 G/DL (ref 6.4–8.2)
RBC # BLD AUTO: 2.88 X10*6/UL (ref 4.5–5.9)
SODIUM SERPL-SCNC: 139 MMOL/L (ref 136–145)
WBC # BLD AUTO: 11 X10*3/UL (ref 4.4–11.3)

## 2024-12-16 PROCEDURE — 2500000004 HC RX 250 GENERAL PHARMACY W/ HCPCS (ALT 636 FOR OP/ED): Mod: JG | Performed by: INTERNAL MEDICINE

## 2024-12-16 PROCEDURE — 85025 COMPLETE CBC W/AUTO DIFF WBC: CPT

## 2024-12-16 PROCEDURE — 36415 COLL VENOUS BLD VENIPUNCTURE: CPT

## 2024-12-16 PROCEDURE — 96372 THER/PROPH/DIAG INJ SC/IM: CPT

## 2024-12-16 PROCEDURE — 80053 COMPREHEN METABOLIC PANEL: CPT

## 2024-12-16 RX ORDER — CARBIDOPA AND LEVODOPA 10; 100 MG/1; MG/1
TABLET ORAL
COMMUNITY
Start: 2024-12-12 | End: 2025-01-25

## 2024-12-16 RX ORDER — ALBUTEROL SULFATE 0.83 MG/ML
3 SOLUTION RESPIRATORY (INHALATION) AS NEEDED
OUTPATIENT
Start: 2025-01-06

## 2024-12-16 RX ORDER — FAMOTIDINE 10 MG/ML
20 INJECTION INTRAVENOUS ONCE AS NEEDED
OUTPATIENT
Start: 2025-01-06

## 2024-12-16 RX ORDER — DIPHENHYDRAMINE HYDROCHLORIDE 50 MG/ML
50 INJECTION INTRAMUSCULAR; INTRAVENOUS AS NEEDED
OUTPATIENT
Start: 2025-01-06

## 2024-12-16 RX ORDER — EPINEPHRINE 0.3 MG/.3ML
0.3 INJECTION SUBCUTANEOUS EVERY 5 MIN PRN
OUTPATIENT
Start: 2025-01-06

## 2024-12-16 ASSESSMENT — PAIN SCALES - GENERAL: PAINLEVEL_OUTOF10: 0-NO PAIN

## 2024-12-30 DIAGNOSIS — K21.9 GASTROESOPHAGEAL REFLUX DISEASE, UNSPECIFIED WHETHER ESOPHAGITIS PRESENT: ICD-10-CM

## 2024-12-30 DIAGNOSIS — E21.3 HYPERPARATHYROIDISM (MULTI): ICD-10-CM

## 2024-12-31 RX ORDER — PANTOPRAZOLE SODIUM 40 MG/1
40 TABLET, DELAYED RELEASE ORAL DAILY
Qty: 90 TABLET | Refills: 3 | Status: SHIPPED | OUTPATIENT
Start: 2024-12-31

## 2025-01-02 ENCOUNTER — APPOINTMENT (OUTPATIENT)
Dept: PRIMARY CARE | Facility: CLINIC | Age: 80
End: 2025-01-02
Payer: MEDICARE

## 2025-01-02 VITALS
DIASTOLIC BLOOD PRESSURE: 66 MMHG | BODY MASS INDEX: 27.76 KG/M2 | OXYGEN SATURATION: 94 % | WEIGHT: 172 LBS | SYSTOLIC BLOOD PRESSURE: 111 MMHG | HEART RATE: 82 BPM

## 2025-01-02 DIAGNOSIS — Z79.4 DIABETES MELLITUS DUE TO UNDERLYING CONDITION WITH HYPERGLYCEMIA, WITH LONG-TERM CURRENT USE OF INSULIN: ICD-10-CM

## 2025-01-02 DIAGNOSIS — E66.3 OVERWEIGHT WITH BODY MASS INDEX (BMI) OF 27 TO 27.9 IN ADULT: ICD-10-CM

## 2025-01-02 DIAGNOSIS — R20.2 PARESTHESIA OF LEFT LOWER EXTREMITY: ICD-10-CM

## 2025-01-02 DIAGNOSIS — E08.65 DIABETES MELLITUS DUE TO UNDERLYING CONDITION WITH HYPERGLYCEMIA, WITH LONG-TERM CURRENT USE OF INSULIN: ICD-10-CM

## 2025-01-02 DIAGNOSIS — S89.92XA INJURY OF LEFT KNEE, INITIAL ENCOUNTER: ICD-10-CM

## 2025-01-02 DIAGNOSIS — I10 ESSENTIAL HYPERTENSION: ICD-10-CM

## 2025-01-02 DIAGNOSIS — N18.31 STAGE 3A CHRONIC KIDNEY DISEASE (MULTI): ICD-10-CM

## 2025-01-02 DIAGNOSIS — Z87.891 FORMER SMOKER: ICD-10-CM

## 2025-01-02 PROCEDURE — 3074F SYST BP LT 130 MM HG: CPT | Performed by: FAMILY MEDICINE

## 2025-01-02 PROCEDURE — 1160F RVW MEDS BY RX/DR IN RCRD: CPT | Performed by: FAMILY MEDICINE

## 2025-01-02 PROCEDURE — 1123F ACP DISCUSS/DSCN MKR DOCD: CPT | Performed by: FAMILY MEDICINE

## 2025-01-02 PROCEDURE — 1036F TOBACCO NON-USER: CPT | Performed by: FAMILY MEDICINE

## 2025-01-02 PROCEDURE — 3078F DIAST BP <80 MM HG: CPT | Performed by: FAMILY MEDICINE

## 2025-01-02 PROCEDURE — 1159F MED LIST DOCD IN RCRD: CPT | Performed by: FAMILY MEDICINE

## 2025-01-02 PROCEDURE — 99214 OFFICE O/P EST MOD 30 MIN: CPT | Performed by: FAMILY MEDICINE

## 2025-01-02 PROCEDURE — 1125F AMNT PAIN NOTED PAIN PRSNT: CPT | Performed by: FAMILY MEDICINE

## 2025-01-02 RX ORDER — DAPAGLIFLOZIN 10 MG/1
10 TABLET, FILM COATED ORAL
COMMUNITY
Start: 2024-10-11

## 2025-01-02 ASSESSMENT — ENCOUNTER SYMPTOMS
CARDIOVASCULAR NEGATIVE: 1
GASTROINTESTINAL NEGATIVE: 1
NUMBNESS: 1
ARTHRALGIAS: 1
RESPIRATORY NEGATIVE: 1
ALLERGIC/IMMUNOLOGIC NEGATIVE: 1
EYES NEGATIVE: 1
CONSTITUTIONAL NEGATIVE: 1
PSYCHIATRIC NEGATIVE: 1
HEMATOLOGIC/LYMPHATIC NEGATIVE: 1
ENDOCRINE NEGATIVE: 1

## 2025-01-02 ASSESSMENT — PATIENT HEALTH QUESTIONNAIRE - PHQ9
1. LITTLE INTEREST OR PLEASURE IN DOING THINGS: NOT AT ALL
SUM OF ALL RESPONSES TO PHQ9 QUESTIONS 1 AND 2: 0
2. FEELING DOWN, DEPRESSED OR HOPELESS: NOT AT ALL

## 2025-01-02 ASSESSMENT — PAIN SCALES - GENERAL: PAINLEVEL_OUTOF10: 4

## 2025-01-02 NOTE — PROGRESS NOTES
Pat Hua is here for a follow-up on his diabetes.  He states that overall he has been doing well.  He does complain of chronic bilateral foot pain.  He is questioning if diabetic shoes may be of some benefit for him.  He has never seen a podiatrist.  He also notes some left lower leg tingling and numbness for about 2 months.  This began after a car door was caught by the wind and hit him against the left lateral knee.  He has had no swelling in the leg and there is actually no pain except tingling and numbness only in the lower leg.  He continues on his meds noted.  He is currently on Sinemet for Parkinson's disease by his neurologist.  He also sees Dr. Bowser for follow-up on his anemia.  He also follows up with Dr. Burnett for CKD    Patient ID: Chas Melgar is a 79 y.o. male who presents for Follow-up and Leg Pain (Injury to left leg 6weeks ago, thinks maybe has a pinched nerve):    Problem List Items Addressed This Visit    None     Past Medical History:   Diagnosis Date    Acute gastric ulcer with hemorrhage 09/07/2022    Acute gastric ulcer with bleeding    Acute upper respiratory infection, unspecified     URI, acute    Arthritis     Body mass index (BMI) 28.0-28.9, adult 10/19/2021    BMI 28.0-28.9,adult    Cancer (Multi)     Coronary artery disease     Diabetes mellitus (Multi)     Encounter for other preprocedural examination 10/14/2021    Pre-operative clearance    Encounter for screening for malignant neoplasm of prostate     Encounter for prostate cancer screening    Impingement syndrome of unspecified shoulder 07/22/2021    Impingement syndrome of shoulder region, unspecified laterality    Other abnormalities of breathing     Difficulty breathing    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)     Other specified disorders of pancreatic internal secretion    Other specified follicular disorders     Actinic folliculitis    Other specified postprocedural states  06/09/2021    Status post arthroscopy of left shoulder    Overweight 02/21/2022    Overweight with body mass index (BMI) of 29 to 29.9 in adult    Overweight 01/18/2022    Overweight with body mass index (BMI) of 28 to 28.9 in adult    Pain in right hip     Hip pain, right    Pain in right leg     Pain of right lower extremity    Pain in unspecified hip 08/04/2021    Hip joint pain    Pain in unspecified shoulder 08/10/2021    Shoulder pain    Personal history of malignant neoplasm, unspecified 08/04/2021    History of malignant neoplasm    Personal history of other diseases of male genital organs     History of benign prostatic hyperplasia    Personal history of other diseases of the circulatory system 08/04/2021    History of hypertension    Personal history of other diseases of the circulatory system 08/04/2021    History of cardiac disorder    Personal history of other diseases of the digestive system 10/14/2021    History of gastrointestinal hemorrhage    Personal history of other diseases of the digestive system 02/26/2022    History of right inguinal hernia    Personal history of other diseases of the digestive system     History of fatty infiltration of liver    Personal history of other diseases of the musculoskeletal system and connective tissue 08/04/2021    History of arthritis    Personal history of other diseases of the musculoskeletal system and connective tissue 08/05/2021    History of rotator cuff tear    Personal history of other diseases of the respiratory system 11/17/2021    History of upper respiratory infection    Personal history of other diseases of the respiratory system     History of acute bronchitis    Personal history of other diseases of the respiratory system     History of acute pharyngitis    Personal history of other diseases of the respiratory system     History of acute sinusitis    Personal history of other diseases of urinary system 09/01/2022    History of renal insufficiency  syndrome    Personal history of other endocrine, nutritional and metabolic disease 10/06/2021    History of diabetes mellitus    Personal history of other endocrine, nutritional and metabolic disease 01/18/2022    History of hyperkalemia    Personal history of other malignant neoplasm of skin     History of other malignant neoplasm of skin    Personal history of other specified conditions 09/01/2022    History of chest pain    Personal history of other specified conditions     History of dysuria    Personal history of other specified conditions     History of chest pain    Persons encountering health services in other specified circumstances     Encounter for support and coordination of transition of care    Primary osteoarthritis, 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    DJD of left shoulder    Right lower quadrant pain 02/26/2022    Right inguinal pain    Strain of muscle, fascia and tendon of lower back, initial encounter     Lumbar strain    Strain of muscle, fascia and tendon of other parts of biceps, right arm, initial encounter 10/14/2021    Rupture of right biceps tendon, initial encounter    Trochanteric bursitis, left hip 08/10/2021    Trochanteric bursitis of left hip      Past Surgical History:   Procedure Laterality Date    CT ANGIO AORTA AND BILATERAL ILIOFEMORAL RUN OFF INCLUDING WITHOUT CONTRAST IF PERFORMED  10/27/2020    CT AORTA AND BILATERAL ILIOFEMORAL RUNOFF ANGIOGRAM W AND/OR WO IV CONTRAST 10/27/2020 [facility]    OTHER SURGICAL HISTORY  11/07/2022    Colonoscopy    OTHER SURGICAL HISTORY  10/14/2021    Cardiac catheterization with stent placement    OTHER SURGICAL HISTORY  10/14/2021    PTA femoral artery    OTHER SURGICAL HISTORY  10/14/2021    Rotator cuff repair    OTHER SURGICAL HISTORY  10/14/2021    Inguinal hernia repair    OTHER SURGICAL HISTORY  10/14/2021    Knee arthroscopy    OTHER SURGICAL HISTORY  11/17/2021    Skin biopsy    ROTATOR CUFF REPAIR        Family History   Problem  "Relation Name Age of Onset    Diabetes Mother Melinda andino     Coronary artery disease Father      Hypertension Other      Alzheimer's disease Other        Social History     Socioeconomic History    Marital status:      Spouse name: Not on file    Number of children: Not on file    Years of education: Not on file    Highest education level: Not on file   Occupational History    Not on file   Tobacco Use    Smoking status: Former     Current packs/day: 0.00     Average packs/day: 2.0 packs/day for 20.0 years (40.0 ttl pk-yrs)     Types: Cigarettes     Start date: 1966     Quit date: 1986     Years since quittin.0    Smokeless tobacco: Never   Vaping Use    Vaping status: Never Used   Substance and Sexual Activity    Alcohol use: Yes     Alcohol/week: 2.0 standard drinks of alcohol     Types: 2 Standard drinks or equivalent per week     Comment: weekly    Drug use: Never    Sexual activity: Not Currently     Partners: Female   Other Topics Concern    Not on file   Social History Narrative    Not on file     Social Drivers of Health     Financial Resource Strain: Not on file   Food Insecurity: Not on file   Transportation Needs: Not on file   Physical Activity: Not on file   Stress: Not on file   Social Connections: Not on file   Intimate Partner Violence: Not on file   Housing Stability: Not on file      Aluminum and Nickel   Current Outpatient Medications   Medication Sig Dispense Refill    alfuzosin (Uroxatral) 10 mg 24 hr tablet Take 1 tablet (10 mg) by mouth early in the morning..      aspirin 81 mg EC tablet Take 1 tablet (81 mg) by mouth once daily.      BD Ultra-Fine Short Pen Needle 31 gauge x 516\" needle USE ONCE DAILY 100 each 3    calcitriol (Rocaltrol) 0.25 mcg capsule Take 1 capsule (0.25 mcg) by mouth every other day. 45 capsule 3    carbidopa-levodopa (Sinemet)  mg tablet Take by mouth.      cyanocobalamin (Vitamin B-12) 1,000 mcg tablet Take 2 tablets (2,000 mcg) by mouth " once daily.      dapagliflozin propanediol (Farxiga) 10 mg Take 1 tablet (10 mg) by mouth once daily in the morning. Take before meals.      dilTIAZem CD (Cardizem CD) 240 mg 24 hr capsule TAKE 1 CAPSULE BY MOUTH DAILY 270 capsule 0    folic acid 0.8 mg capsule Take 1 tablet by mouth once daily at bedtime. (Patient taking differently: Take 1 tablet by mouth once daily in the morning.)      glimepiride (Amaryl) 4 mg tablet Take 1 tablet (4 mg) by mouth 2 times a day. 180 tablet 1    hydroCHLOROthiazide (HYDRODiuril) 25 mg tablet TAKE 1 TABLET BY MOUTH ONCE DAILY 90 tablet 1    insulin glargine (Basaglar KwikPen U-100 Insulin) 100 unit/mL (3 mL) pen Inject 20 Units under the skin once daily at bedtime. 15 mL 1    isosorbide mononitrate ER (Imdur) 30 mg 24 hr tablet TAKE 1 TABLET BY MOUTH DAILY 270 tablet 0    lisinopril 30 mg tablet TAKE 1 TABLET BY MOUTH ONCE DAILY 90 tablet 1    nitroglycerin (Nitrostat) 0.4 mg SL tablet Place 1 tablet (0.4 mg) under the tongue every 5 minutes if needed for chest pain. 25 tablet 5    pantoprazole (ProtoNix) 40 mg EC tablet TAKE 1 TABLET BY MOUTH ONCE DAILY 90 tablet 3    simvastatin (Zocor) 20 mg tablet TAKE 1 TABLET BY MOUTH EVERY DAY AT BEDTIME 180 tablet 0    tamsulosin (Flomax) 0.4 mg 24 hr capsule Take 1 capsule (0.4 mg) by mouth once daily.       No current facility-administered medications for this visit.       Immunization History   Administered Date(s) Administered    Flu vaccine, quadrivalent, high-dose, preservative free, age 65y+ (FLUZONE) 10/28/2020, 11/10/2023    Flu vaccine, trivalent, preservative free, HIGH-DOSE, age 65y+ (Fluzone) 11/16/2017, 09/20/2018, 11/29/2019, 10/28/2024    Hep A / Hep B 05/08/2014, 06/16/2014, 11/12/2014    Influenza, Unspecified 10/01/2011, 10/03/2012, 10/01/2013, 10/01/2014, 10/01/2015, 10/14/2016    Pfizer Purple Cap SARS-CoV-2 02/27/2021, 03/20/2021, 01/22/2022    Pneumococcal conjugate vaccine, 13-valent (PREVNAR 13) 11/30/2016     Pneumococcal polysaccharide vaccine, 23-valent, age 2 years and older (PNEUMOVAX 23) 06/19/2011        Review of Systems   Constitutional: Negative.    HENT: Negative.     Eyes: Negative.    Respiratory: Negative.     Cardiovascular: Negative.    Gastrointestinal: Negative.    Endocrine: Negative.    Genitourinary: Negative.    Musculoskeletal:  Positive for arthralgias.   Skin: Negative.    Allergic/Immunologic: Negative.    Neurological:  Positive for numbness.   Hematological: Negative.    Psychiatric/Behavioral: Negative.     All other systems reviewed and are negative.       Vitals:    01/02/25 1027   BP: 111/66   Pulse: 82   SpO2: 94%     Vitals:    01/02/25 1027   Weight: 78 kg (172 lb)      Physical Exam  Constitutional:       General: He is not in acute distress.     Appearance: Normal appearance.   Cardiovascular:      Rate and Rhythm: Normal rate and regular rhythm.      Pulses: Normal pulses.      Heart sounds: Murmur (Her S1-S2 with a 1/6 systolic ejection murmur at the left sternal border) heard.      No friction rub. No gallop.   Pulmonary:      Effort: Pulmonary effort is normal. No respiratory distress.      Breath sounds: Normal breath sounds. No wheezing or rales.   Neurological:      Mental Status: He is alert.     Left knee and left lower leg-following in the knee or in the lower leg.  There is no sign of bruising or laceration.  The patient does have intact sensation in his left lower leg.  Left knee range of motion is somewhat limited and stiff.    ASSESSMENT/PLAN: Left lower leg paresthesias.  This is slowly improving.  Continue to monitor this closely.  We discussed the use of gabapentin however he has just recently started Sinemet.  He will discuss with his neurologist the numbness in his leg and the use of gabapentin.    Severe DJD of left knee.  Follow-up with orthopedics as recommended.  May use Tylenol as needed.    Diabetes mellitus type 2.  A1c 7.2.  Insurance requesting a change from  Farxiga to Jardiance.  After current supply of Farxiga is completed he will begin Jardiance 10 mg daily.  Check CMP and A1c in 4 months.  Because of ongoing foot pain he is referred to podiatry for further evaluation.    Hypertension stable.  Continue current meds    Chronic anemia followed by Dr. Bowser    Chronic kidney disease followed by Dr. Burnett    Parkinson's disease evaluated by neurology.  Currently on Sinemet.  Follow-up with neurology as scheduled    Follow-up in 4 months and call as needed       Scribe Attestation  By signing my name below, I, Orly Alvarenga LPN, Scribe   attest that this documentation has been prepared under the direction and in the presence of Jimmy Helm MD.

## 2025-01-03 ENCOUNTER — HOSPITAL ENCOUNTER (OUTPATIENT)
Dept: RADIOLOGY | Facility: HOSPITAL | Age: 80
Discharge: HOME | End: 2025-01-03
Payer: MEDICARE

## 2025-01-03 DIAGNOSIS — E08.65 DIABETES MELLITUS DUE TO UNDERLYING CONDITION WITH HYPERGLYCEMIA: ICD-10-CM

## 2025-01-03 DIAGNOSIS — Z79.4 LONG TERM (CURRENT) USE OF INSULIN (MULTI): ICD-10-CM

## 2025-01-03 DIAGNOSIS — I65.23 OCCLUSION AND STENOSIS OF BILATERAL CAROTID ARTERIES: ICD-10-CM

## 2025-01-03 DIAGNOSIS — I10 ESSENTIAL (PRIMARY) HYPERTENSION: ICD-10-CM

## 2025-01-03 DIAGNOSIS — G20.A1 PARKINSON'S DISEASE WITHOUT DYSKINESIA, WITHOUT MENTION OF FLUCTUATIONS (MULTI): ICD-10-CM

## 2025-01-03 PROCEDURE — 70551 MRI BRAIN STEM W/O DYE: CPT

## 2025-01-04 RX ORDER — CALCITRIOL 0.25 UG/1
0.25 CAPSULE ORAL EVERY OTHER DAY
Qty: 45 CAPSULE | Refills: 3 | Status: SHIPPED | OUTPATIENT
Start: 2025-01-04

## 2025-01-06 ENCOUNTER — LAB (OUTPATIENT)
Dept: LAB | Facility: CLINIC | Age: 80
End: 2025-01-06
Payer: MEDICARE

## 2025-01-06 ENCOUNTER — INFUSION (OUTPATIENT)
Dept: HEMATOLOGY/ONCOLOGY | Facility: CLINIC | Age: 80
End: 2025-01-06
Payer: MEDICARE

## 2025-01-06 VITALS
SYSTOLIC BLOOD PRESSURE: 105 MMHG | BODY MASS INDEX: 28.09 KG/M2 | DIASTOLIC BLOOD PRESSURE: 65 MMHG | HEART RATE: 102 BPM | RESPIRATION RATE: 18 BRPM | TEMPERATURE: 97 F | WEIGHT: 174.05 LBS | OXYGEN SATURATION: 97 %

## 2025-01-06 DIAGNOSIS — D46.1 REFRACTORY ANEMIA WITH RING SIDEROBLASTS (MULTI): ICD-10-CM

## 2025-01-06 DIAGNOSIS — D46.Z MDS (MYELODYSPLASTIC SYNDROME), LOW GRADE (MULTI): ICD-10-CM

## 2025-01-06 LAB
ALBUMIN SERPL BCP-MCNC: 4.7 G/DL (ref 3.4–5)
ALP SERPL-CCNC: 73 U/L (ref 33–136)
ALT SERPL W P-5'-P-CCNC: 7 U/L (ref 10–52)
ANION GAP SERPL CALC-SCNC: 17 MMOL/L (ref 10–20)
AST SERPL W P-5'-P-CCNC: 12 U/L (ref 9–39)
BASOPHILS # BLD AUTO: 0.1 X10*3/UL (ref 0–0.1)
BASOPHILS NFR BLD AUTO: 1.3 %
BILIRUB SERPL-MCNC: 1.4 MG/DL (ref 0–1.2)
BUN SERPL-MCNC: 46 MG/DL (ref 6–23)
CALCIUM SERPL-MCNC: 9.6 MG/DL (ref 8.6–10.3)
CHLORIDE SERPL-SCNC: 101 MMOL/L (ref 98–107)
CO2 SERPL-SCNC: 24 MMOL/L (ref 21–32)
CREAT SERPL-MCNC: 2.23 MG/DL (ref 0.5–1.3)
DACRYOCYTES BLD QL SMEAR: NORMAL
EGFRCR SERPLBLD CKD-EPI 2021: 29 ML/MIN/1.73M*2
EOSINOPHIL # BLD AUTO: 0.63 X10*3/UL (ref 0–0.4)
EOSINOPHIL NFR BLD AUTO: 8.3 %
ERYTHROCYTE [DISTWIDTH] IN BLOOD BY AUTOMATED COUNT: 21.5 % (ref 11.5–14.5)
GLUCOSE SERPL-MCNC: 200 MG/DL (ref 74–99)
HCT VFR BLD AUTO: 33.8 % (ref 41–52)
HGB BLD-MCNC: 11.6 G/DL (ref 13.5–17.5)
IMM GRANULOCYTES # BLD AUTO: 0.02 X10*3/UL (ref 0–0.5)
IMM GRANULOCYTES NFR BLD AUTO: 0.3 % (ref 0–0.9)
LYMPHOCYTES # BLD AUTO: 1.49 X10*3/UL (ref 0.8–3)
LYMPHOCYTES NFR BLD AUTO: 19.7 %
MCH RBC QN AUTO: 37.4 PG (ref 26–34)
MCHC RBC AUTO-ENTMCNC: 34.3 G/DL (ref 32–36)
MCV RBC AUTO: 109 FL (ref 80–100)
MONOCYTES # BLD AUTO: 0.71 X10*3/UL (ref 0.05–0.8)
MONOCYTES NFR BLD AUTO: 9.4 %
NEUTROPHILS # BLD AUTO: 4.62 X10*3/UL (ref 1.6–5.5)
NEUTROPHILS NFR BLD AUTO: 61 %
NRBC BLD-RTO: ABNORMAL /100{WBCS}
OVALOCYTES BLD QL SMEAR: NORMAL
PLATELET # BLD AUTO: 152 X10*3/UL (ref 150–450)
POTASSIUM SERPL-SCNC: 4.4 MMOL/L (ref 3.5–5.3)
PROT SERPL-MCNC: 7.1 G/DL (ref 6.4–8.2)
RBC # BLD AUTO: 3.1 X10*6/UL (ref 4.5–5.9)
RBC MORPH BLD: NORMAL
SCHISTOCYTES BLD QL SMEAR: NORMAL
SODIUM SERPL-SCNC: 138 MMOL/L (ref 136–145)
STOMATOCYTES BLD QL SMEAR: NORMAL
WBC # BLD AUTO: 7.6 X10*3/UL (ref 4.4–11.3)

## 2025-01-06 PROCEDURE — 85025 COMPLETE CBC W/AUTO DIFF WBC: CPT

## 2025-01-06 PROCEDURE — 36415 COLL VENOUS BLD VENIPUNCTURE: CPT

## 2025-01-06 PROCEDURE — 80053 COMPREHEN METABOLIC PANEL: CPT

## 2025-01-06 ASSESSMENT — PAIN SCALES - GENERAL: PAINLEVEL_OUTOF10: 2

## 2025-01-06 NOTE — PATIENT INSTRUCTIONS
Hgb-11.6 today. Luspatercept injection held per parameter in order. Dr Bowser notified. Pt will RTC in 3 weeks for labs and possible injection.

## 2025-01-09 DIAGNOSIS — E04.1 THYROID NODULE: Primary | ICD-10-CM

## 2025-01-16 DIAGNOSIS — Z95.1 S/P CABG X 5: ICD-10-CM

## 2025-01-16 DIAGNOSIS — I25.10 CORONARY ARTERY DISEASE INVOLVING NATIVE CORONARY ARTERY OF NATIVE HEART, UNSPECIFIED WHETHER ANGINA PRESENT: ICD-10-CM

## 2025-01-16 NOTE — TELEPHONE ENCOUNTER
Received request for prescription refill for patient.  Patient follows with Dr. Wilfrido Goodwin MD, [facility]     Request is for Farxiga   Is patient currently on medication- yes    Last OV- 10/24/24  Next OV- 7/24/25    Pended for signing and sent to provider.

## 2025-01-17 DIAGNOSIS — E11.9 DIABETES MELLITUS TREATED WITH INSULIN AND ORAL MEDICATION (MULTI): ICD-10-CM

## 2025-01-17 DIAGNOSIS — Z79.84 DIABETES MELLITUS TREATED WITH INSULIN AND ORAL MEDICATION (MULTI): ICD-10-CM

## 2025-01-17 DIAGNOSIS — Z79.4 DIABETES MELLITUS TREATED WITH INSULIN AND ORAL MEDICATION (MULTI): ICD-10-CM

## 2025-01-17 RX ORDER — DAPAGLIFLOZIN 10 MG/1
10 TABLET, FILM COATED ORAL
Qty: 90 TABLET | Refills: 3 | Status: SHIPPED | OUTPATIENT
Start: 2025-01-17 | End: 2026-01-17

## 2025-01-18 RX ORDER — INSULIN GLARGINE 100 [IU]/ML
20 INJECTION, SOLUTION SUBCUTANEOUS NIGHTLY
Qty: 15 ML | Refills: 1 | Status: SHIPPED | OUTPATIENT
Start: 2025-01-18

## 2025-01-27 ENCOUNTER — INFUSION (OUTPATIENT)
Dept: HEMATOLOGY/ONCOLOGY | Facility: CLINIC | Age: 80
End: 2025-01-27
Payer: MEDICARE

## 2025-01-27 ENCOUNTER — LAB (OUTPATIENT)
Dept: LAB | Facility: CLINIC | Age: 80
End: 2025-01-27
Payer: MEDICARE

## 2025-01-27 ENCOUNTER — OFFICE VISIT (OUTPATIENT)
Dept: HEMATOLOGY/ONCOLOGY | Facility: CLINIC | Age: 80
End: 2025-01-27
Payer: MEDICARE

## 2025-01-27 VITALS
BODY MASS INDEX: 28 KG/M2 | HEART RATE: 91 BPM | DIASTOLIC BLOOD PRESSURE: 52 MMHG | WEIGHT: 173.5 LBS | OXYGEN SATURATION: 95 % | SYSTOLIC BLOOD PRESSURE: 108 MMHG | TEMPERATURE: 97 F | RESPIRATION RATE: 18 BRPM

## 2025-01-27 DIAGNOSIS — D46.1 REFRACTORY ANEMIA WITH RING SIDEROBLASTS (MULTI): ICD-10-CM

## 2025-01-27 DIAGNOSIS — I73.9 PVD (PERIPHERAL VASCULAR DISEASE) (CMS-HCC): ICD-10-CM

## 2025-01-27 DIAGNOSIS — Z79.4 DIABETES MELLITUS DUE TO UNDERLYING CONDITION WITH HYPERGLYCEMIA, WITH LONG-TERM CURRENT USE OF INSULIN: ICD-10-CM

## 2025-01-27 DIAGNOSIS — E83.110 HEREDITARY HEMOCHROMATOSIS (CMS-HCC): ICD-10-CM

## 2025-01-27 DIAGNOSIS — E08.65 DIABETES MELLITUS DUE TO UNDERLYING CONDITION WITH HYPERGLYCEMIA, WITH LONG-TERM CURRENT USE OF INSULIN: ICD-10-CM

## 2025-01-27 DIAGNOSIS — E78.2 MIXED HYPERLIPIDEMIA: ICD-10-CM

## 2025-01-27 DIAGNOSIS — D46.1 REFRACTORY ANEMIA WITH RING SIDEROBLASTS (MULTI): Primary | ICD-10-CM

## 2025-01-27 DIAGNOSIS — D46.Z MDS (MYELODYSPLASTIC SYNDROME), LOW GRADE (MULTI): ICD-10-CM

## 2025-01-27 DIAGNOSIS — I10 ESSENTIAL HYPERTENSION: ICD-10-CM

## 2025-01-27 LAB
ALBUMIN SERPL BCP-MCNC: 4.6 G/DL (ref 3.4–5)
ALP SERPL-CCNC: 70 U/L (ref 33–136)
ALT SERPL W P-5'-P-CCNC: 3 U/L (ref 10–52)
ANION GAP SERPL CALC-SCNC: 15 MMOL/L (ref 10–20)
AST SERPL W P-5'-P-CCNC: 11 U/L (ref 9–39)
BASOPHILS # BLD AUTO: 0.09 X10*3/UL (ref 0–0.1)
BASOPHILS NFR BLD AUTO: 1.3 %
BILIRUB SERPL-MCNC: 1.3 MG/DL (ref 0–1.2)
BUN SERPL-MCNC: 48 MG/DL (ref 6–23)
CALCIUM SERPL-MCNC: 9.2 MG/DL (ref 8.6–10.3)
CHLORIDE SERPL-SCNC: 103 MMOL/L (ref 98–107)
CO2 SERPL-SCNC: 24 MMOL/L (ref 21–32)
CREAT SERPL-MCNC: 1.94 MG/DL (ref 0.5–1.3)
EGFRCR SERPLBLD CKD-EPI 2021: 34 ML/MIN/1.73M*2
EOSINOPHIL # BLD AUTO: 0.49 X10*3/UL (ref 0–0.4)
EOSINOPHIL NFR BLD AUTO: 7.2 %
ERYTHROCYTE [DISTWIDTH] IN BLOOD BY AUTOMATED COUNT: 21.8 % (ref 11.5–14.5)
GLUCOSE SERPL-MCNC: 197 MG/DL (ref 74–99)
HCT VFR BLD AUTO: 28 % (ref 41–52)
HGB BLD-MCNC: 9.4 G/DL (ref 13.5–17.5)
IMM GRANULOCYTES # BLD AUTO: 0.01 X10*3/UL (ref 0–0.5)
IMM GRANULOCYTES NFR BLD AUTO: 0.1 % (ref 0–0.9)
LYMPHOCYTES # BLD AUTO: 1.66 X10*3/UL (ref 0.8–3)
LYMPHOCYTES NFR BLD AUTO: 24.2 %
MCH RBC QN AUTO: 37.2 PG (ref 26–34)
MCHC RBC AUTO-ENTMCNC: 33.6 G/DL (ref 32–36)
MCV RBC AUTO: 111 FL (ref 80–100)
MONOCYTES # BLD AUTO: 0.65 X10*3/UL (ref 0.05–0.8)
MONOCYTES NFR BLD AUTO: 9.5 %
NEUTROPHILS # BLD AUTO: 3.95 X10*3/UL (ref 1.6–5.5)
NEUTROPHILS NFR BLD AUTO: 57.7 %
PLATELET # BLD AUTO: 175 X10*3/UL (ref 150–450)
POTASSIUM SERPL-SCNC: 4.4 MMOL/L (ref 3.5–5.3)
PROT SERPL-MCNC: 7.3 G/DL (ref 6.4–8.2)
RBC # BLD AUTO: 2.53 X10*6/UL (ref 4.5–5.9)
SODIUM SERPL-SCNC: 138 MMOL/L (ref 136–145)
WBC # BLD AUTO: 6.9 X10*3/UL (ref 4.4–11.3)

## 2025-01-27 PROCEDURE — 3078F DIAST BP <80 MM HG: CPT | Performed by: INTERNAL MEDICINE

## 2025-01-27 PROCEDURE — 1159F MED LIST DOCD IN RCRD: CPT | Performed by: INTERNAL MEDICINE

## 2025-01-27 PROCEDURE — 99214 OFFICE O/P EST MOD 30 MIN: CPT | Mod: 25 | Performed by: INTERNAL MEDICINE

## 2025-01-27 PROCEDURE — 99214 OFFICE O/P EST MOD 30 MIN: CPT | Performed by: INTERNAL MEDICINE

## 2025-01-27 PROCEDURE — G2211 COMPLEX E/M VISIT ADD ON: HCPCS | Performed by: INTERNAL MEDICINE

## 2025-01-27 PROCEDURE — 1125F AMNT PAIN NOTED PAIN PRSNT: CPT | Performed by: INTERNAL MEDICINE

## 2025-01-27 PROCEDURE — 1160F RVW MEDS BY RX/DR IN RCRD: CPT | Performed by: INTERNAL MEDICINE

## 2025-01-27 PROCEDURE — 36415 COLL VENOUS BLD VENIPUNCTURE: CPT

## 2025-01-27 PROCEDURE — 96372 THER/PROPH/DIAG INJ SC/IM: CPT

## 2025-01-27 PROCEDURE — 3074F SYST BP LT 130 MM HG: CPT | Performed by: INTERNAL MEDICINE

## 2025-01-27 PROCEDURE — 2500000004 HC RX 250 GENERAL PHARMACY W/ HCPCS (ALT 636 FOR OP/ED): Mod: JW,TB | Performed by: INTERNAL MEDICINE

## 2025-01-27 PROCEDURE — 85025 COMPLETE CBC W/AUTO DIFF WBC: CPT

## 2025-01-27 PROCEDURE — 84075 ASSAY ALKALINE PHOSPHATASE: CPT

## 2025-01-27 PROCEDURE — 1123F ACP DISCUSS/DSCN MKR DOCD: CPT | Performed by: INTERNAL MEDICINE

## 2025-01-27 RX ORDER — EPINEPHRINE 0.3 MG/.3ML
0.3 INJECTION SUBCUTANEOUS EVERY 5 MIN PRN
OUTPATIENT
Start: 2025-02-17

## 2025-01-27 RX ORDER — DIPHENHYDRAMINE HYDROCHLORIDE 50 MG/ML
50 INJECTION INTRAMUSCULAR; INTRAVENOUS AS NEEDED
Status: DISCONTINUED | OUTPATIENT
Start: 2025-01-27 | End: 2025-01-27 | Stop reason: HOSPADM

## 2025-01-27 RX ORDER — ALBUTEROL SULFATE 0.83 MG/ML
3 SOLUTION RESPIRATORY (INHALATION) AS NEEDED
Status: DISCONTINUED | OUTPATIENT
Start: 2025-01-27 | End: 2025-01-27 | Stop reason: HOSPADM

## 2025-01-27 RX ORDER — EPINEPHRINE 0.3 MG/.3ML
0.3 INJECTION SUBCUTANEOUS EVERY 5 MIN PRN
Status: DISCONTINUED | OUTPATIENT
Start: 2025-01-27 | End: 2025-01-27 | Stop reason: HOSPADM

## 2025-01-27 RX ORDER — FAMOTIDINE 10 MG/ML
20 INJECTION INTRAVENOUS ONCE AS NEEDED
Status: DISCONTINUED | OUTPATIENT
Start: 2025-01-27 | End: 2025-01-27 | Stop reason: HOSPADM

## 2025-01-27 RX ORDER — FAMOTIDINE 10 MG/ML
20 INJECTION INTRAVENOUS ONCE AS NEEDED
OUTPATIENT
Start: 2025-02-17

## 2025-01-27 RX ORDER — ALBUTEROL SULFATE 0.83 MG/ML
3 SOLUTION RESPIRATORY (INHALATION) AS NEEDED
OUTPATIENT
Start: 2025-02-17

## 2025-01-27 RX ORDER — DIPHENHYDRAMINE HYDROCHLORIDE 50 MG/ML
50 INJECTION INTRAMUSCULAR; INTRAVENOUS AS NEEDED
OUTPATIENT
Start: 2025-02-17

## 2025-01-27 RX ADMIN — LUSPATERCEPT 137.5 MG: 75 INJECTION, POWDER, LYOPHILIZED, FOR SOLUTION SUBCUTANEOUS at 13:08

## 2025-01-27 ASSESSMENT — ENCOUNTER SYMPTOMS
FATIGUE: 1
EYES NEGATIVE: 1
PSYCHIATRIC NEGATIVE: 1
GASTROINTESTINAL NEGATIVE: 1
HEMATOLOGIC/LYMPHATIC NEGATIVE: 1
ENDOCRINE NEGATIVE: 1
RESPIRATORY NEGATIVE: 1
CARDIOVASCULAR NEGATIVE: 1
ARTHRALGIAS: 1

## 2025-01-27 ASSESSMENT — PAIN SCALES - GENERAL: PAINLEVEL_OUTOF10: 3

## 2025-01-27 NOTE — PROGRESS NOTES
Patient ID: Chas Melgar is a 79 y.o. male.  Referring Physician: Miki Bowser MD  [address]  [place] [address]  Primary Care Provider: Jimmy Helm MD  Visit Type: Follow Up      Subjective    HPI How are my blood counts?    Review of Systems   Constitutional:  Positive for fatigue.   HENT:  Negative.     Eyes: Negative.    Respiratory: Negative.     Cardiovascular: Negative.    Gastrointestinal: Negative.    Endocrine: Negative.    Genitourinary: Negative.     Musculoskeletal:  Positive for arthralgias.   Skin: Negative.    Neurological:         Tingling in left leg   Hematological: Negative.    Psychiatric/Behavioral: Negative.          Objective   BSA: 1.91 meters squared  /52 (BP Location: Left arm)   Pulse 91   Temp 36.1 °C (97 °F) (Temporal)   Resp 18   Wt 78.7 kg (173 lb 8 oz)   SpO2 95%   BMI 28.00 kg/m²      has a past medical history of Acute gastric ulcer with hemorrhage (09/07/2022), Acute upper respiratory infection, unspecified, Arthritis, Body mass index (BMI) 28.0-28.9, adult (10/19/2021), Cancer (Multi), Coronary artery disease, Diabetes mellitus (Multi), Encounter for other preprocedural examination (10/14/2021), Encounter for screening for malignant neoplasm of prostate, Impingement syndrome of unspecified shoulder (07/22/2021), Other abnormalities of breathing, 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[facility]), Other specified follicular disorders, Other specified postprocedural states (06/09/2021), Overweight (02/21/2022), Overweight (01/18/2022), Pain in right hip, Pain in right leg, Pain in unspecified hip (08/04/2021), Pain in unspecified shoulder (08/10/2021), Personal history of malignant neoplasm, unspecified (08/04/2021), Personal history of other diseases of male genital organs, Personal history of other diseases of the circulatory system (08/04/2021), Personal history of other diseases of the circulatory system  (08/04/2021), Personal history of other diseases of the digestive system (10/14/2021), Personal history of other diseases of the digestive system (02/26/2022), Personal history of other diseases of the digestive system, Personal history of other diseases of the musculoskeletal system and connective tissue (08/04/2021), Personal history of other diseases of the musculoskeletal system and connective tissue (08/05/2021), Personal history of other diseases of the respiratory system (11/17/2021), Personal history of other diseases of the respiratory system, Personal history of other diseases of the respiratory system, Personal history of other diseases of the respiratory system, Personal history of other diseases of urinary system (09/01/2022), Personal history of other endocrine, nutritional and metabolic disease (10/06/2021), Personal history of other endocrine, nutritional and metabolic disease (01/18/2022), Personal history of other malignant neoplasm of skin, Personal history of other specified conditions (09/01/2022), Personal history of other specified conditions, Personal history of other specified conditions, Persons encountering health services in other specified circumstances, Primary osteoarthritis, 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   has a past surgical history that includes Other surgical history (11/07/2022); Other surgical history (10/14/2021); Other surgical history (10/14/2021); Other surgical history (10/14/2021); Other surgical history (10/14/2021); Other surgical history (10/14/2021); Other surgical history (11/17/2021); CT angio aorta and bilateral iliofemoral runoff including without contrast if performed (10/27/2020); and Rotator cuff  repair.  Family History   Problem Relation Name Age of Onset    Diabetes Mother Melinda melgar     Coronary artery disease Father      Hypertension Other      Alzheimer's disease Other       Oncology History    No history exists.       Chas Melgar  reports that he quit smoking about 39 years ago. His smoking use included cigarettes. He started smoking about 59 years ago. He has a 40 pack-year smoking history. He has never used smokeless tobacco.  He  reports current alcohol use of about 2.0 standard drinks of alcohol per week.  He  reports no history of drug use.    Physical Exam  Vitals reviewed.   Constitutional:       Appearance: Normal appearance.      Comments: Arrives in a wheelchair   HENT:      Head: Normocephalic.      Mouth/Throat:      Mouth: Mucous membranes are moist.   Eyes:      Extraocular Movements: Extraocular movements intact.      Pupils: Pupils are equal, round, and reactive to light.   Cardiovascular:      Rate and Rhythm: Normal rate and regular rhythm.      Pulses: Normal pulses.      Heart sounds: Normal heart sounds.   Pulmonary:      Effort: Pulmonary effort is normal.      Breath sounds: Normal breath sounds.   Abdominal:      General: Bowel sounds are normal.      Palpations: Abdomen is soft.   Musculoskeletal:         General: Normal range of motion.      Cervical back: Normal range of motion and neck supple.   Skin:     General: Skin is warm.   Neurological:      General: No focal deficit present.      Mental Status: He is alert and oriented to person, place, and time.   Psychiatric:         Mood and Affect: Mood normal.         Behavior: Behavior normal.         WBC   Date/Time Value Ref Range Status   01/06/2025 09:27 AM 7.6 4.4 - 11.3 x10*3/uL Final   12/16/2024 10:35 AM 11.0 4.4 - 11.3 x10*3/uL Final   11/25/2024 12:22 PM 6.6 4.4 - 11.3 x10*3/uL Final     nRBC   Date Value Ref Range Status   01/06/2025   Final     Comment:     Not Measured   12/16/2024   Final     Comment:      Not Measured   11/25/2024   Final     Comment:     Not Measured     RBC   Date Value Ref Range Status   01/06/2025 3.10 (L) 4.50 - 5.90 x10*6/uL Final   12/16/2024 2.88 (L) 4.50 - 5.90 x10*6/uL Final   11/25/2024 2.78 (L) 4.50 - 5.90 x10*6/uL Final     Hemoglobin   Date Value Ref Range Status   01/06/2025 11.6 (L) 13.5 - 17.5 g/dL Final   12/16/2024 10.8 (L) 13.5 - 17.5 g/dL Final   11/25/2024 10.3 (L) 13.5 - 17.5 g/dL Final     Hematocrit   Date Value Ref Range Status   01/06/2025 33.8 (L) 41.0 - 52.0 % Final   12/16/2024 32.2 (L) 41.0 - 52.0 % Final   11/25/2024 30.9 (L) 41.0 - 52.0 % Final     MCV   Date/Time Value Ref Range Status   01/06/2025 09:27  (H) 80 - 100 fL Final   12/16/2024 10:35  (H) 80 - 100 fL Final   11/25/2024 12:22  (H) 80 - 100 fL Final     MCH   Date/Time Value Ref Range Status   01/06/2025 09:27 AM 37.4 (H) 26.0 - 34.0 pg Final   12/16/2024 10:35 AM 37.5 (H) 26.0 - 34.0 pg Final   11/25/2024 12:22 PM 37.1 (H) 26.0 - 34.0 pg Final     MCHC   Date/Time Value Ref Range Status   01/06/2025 09:27 AM 34.3 32.0 - 36.0 g/dL Final   12/16/2024 10:35 AM 33.5 32.0 - 36.0 g/dL Final   11/25/2024 12:22 PM 33.3 32.0 - 36.0 g/dL Final     RDW   Date/Time Value Ref Range Status   01/06/2025 09:27 AM 21.5 (H) 11.5 - 14.5 % Final   12/16/2024 10:35 AM 22.2 (H) 11.5 - 14.5 % Final   11/25/2024 12:22 PM 20.9 (H) 11.5 - 14.5 % Final     Platelets   Date/Time Value Ref Range Status   01/27/2025 11:03  150 - 450 x10*3/uL Preliminary   01/06/2025 09:27  150 - 450 x10*3/uL Final   12/16/2024 10:35  150 - 450 x10*3/uL Final     MPV   Date/Time Value Ref Range Status   10/23/2023 01:06 PM 14.6 (H) 7.5 - 11.5 fL Final   10/02/2023 12:34 PM 14.5 (H) 7.5 - 11.5 fL Final     Neutrophils %   Date/Time Value Ref Range Status   01/06/2025 09:27 AM 61.0 40.0 - 80.0 % Final   12/16/2024 10:35 AM 62.2 40.0 - 80.0 % Final   11/25/2024 12:22 PM 56.1 40.0 - 80.0 % Final     Immature  Granulocytes %, Automated   Date/Time Value Ref Range Status   01/06/2025 09:27 AM 0.3 0.0 - 0.9 % Final     Comment:     Immature Granulocyte Count (IG) includes promyelocytes, myelocytes and metamyelocytes but does not include bands. Percent differential counts (%) should be interpreted in the context of the absolute cell counts (cells/UL).   12/16/2024 10:35 AM 0.3 0.0 - 0.9 % Final     Comment:     Immature Granulocyte Count (IG) includes promyelocytes, myelocytes and metamyelocytes but does not include bands. Percent differential counts (%) should be interpreted in the context of the absolute cell counts (cells/UL).   11/25/2024 12:22 PM 0.6 0.0 - 0.9 % Final     Comment:     Immature Granulocyte Count (IG) includes promyelocytes, myelocytes and metamyelocytes but does not include bands. Percent differential counts (%) should be interpreted in the context of the absolute cell counts (cells/UL).     Lymphocytes %   Date/Time Value Ref Range Status   01/06/2025 09:27 AM 19.7 13.0 - 44.0 % Final   12/16/2024 10:35 AM 20.2 13.0 - 44.0 % Final   11/25/2024 12:22 PM 24.0 13.0 - 44.0 % Final     Monocytes %   Date/Time Value Ref Range Status   01/06/2025 09:27 AM 9.4 2.0 - 10.0 % Final   12/16/2024 10:35 AM 11.7 2.0 - 10.0 % Final   11/25/2024 12:22 PM 10.1 2.0 - 10.0 % Final     Eosinophils %   Date/Time Value Ref Range Status   01/06/2025 09:27 AM 8.3 0.0 - 6.0 % Final   12/16/2024 10:35 AM 4.5 0.0 - 6.0 % Final   11/25/2024 12:22 PM 7.5 0.0 - 6.0 % Final     Basophils %   Date/Time Value Ref Range Status   01/06/2025 09:27 AM 1.3 0.0 - 2.0 % Final   12/16/2024 10:35 AM 1.1 0.0 - 2.0 % Final   11/25/2024 12:22 PM 1.7 0.0 - 2.0 % Final     Neutrophils Absolute   Date/Time Value Ref Range Status   01/06/2025 09:27 AM 4.62 1.60 - 5.50 x10*3/uL Final     Comment:     Percent differential counts (%) should be interpreted in the context of the absolute cell counts (cells/uL).   12/16/2024 10:35 AM 6.83 (H) 1.60 - 5.50  "x10*3/uL Final     Comment:     Percent differential counts (%) should be interpreted in the context of the absolute cell counts (cells/uL).   11/25/2024 12:22 PM 3.72 1.60 - 5.50 x10*3/uL Final     Comment:     Percent differential counts (%) should be interpreted in the context of the absolute cell counts (cells/uL).     Immature Granulocytes Absolute, Automated   Date/Time Value Ref Range Status   01/06/2025 09:27 AM 0.02 0.00 - 0.50 x10*3/uL Final   12/16/2024 10:35 AM 0.03 0.00 - 0.50 x10*3/uL Final   11/25/2024 12:22 PM 0.04 0.00 - 0.50 x10*3/uL Final     Lymphocytes Absolute   Date/Time Value Ref Range Status   01/06/2025 09:27 AM 1.49 0.80 - 3.00 x10*3/uL Final   12/16/2024 10:35 AM 2.22 0.80 - 3.00 x10*3/uL Final   11/25/2024 12:22 PM 1.59 0.80 - 3.00 x10*3/uL Final     Monocytes Absolute   Date/Time Value Ref Range Status   01/06/2025 09:27 AM 0.71 0.05 - 0.80 x10*3/uL Final   12/16/2024 10:35 AM 1.29 (H) 0.05 - 0.80 x10*3/uL Final   11/25/2024 12:22 PM 0.67 0.05 - 0.80 x10*3/uL Final     Eosinophils Absolute   Date/Time Value Ref Range Status   01/06/2025 09:27 AM 0.63 (H) 0.00 - 0.40 x10*3/uL Final   12/16/2024 10:35 AM 0.50 (H) 0.00 - 0.40 x10*3/uL Final   11/25/2024 12:22 PM 0.50 (H) 0.00 - 0.40 x10*3/uL Final     Basophils Absolute   Date/Time Value Ref Range Status   01/06/2025 09:27 AM 0.10 0.00 - 0.10 x10*3/uL Final     Comment:     Automated WBC differential has been confirmed by manual smear.   12/16/2024 10:35 AM 0.12 (H) 0.00 - 0.10 x10*3/uL Final   11/25/2024 12:22 PM 0.11 (H) 0.00 - 0.10 x10*3/uL Final       No components found for: \"PT\"  aPTT   Date/Time Value Ref Range Status   11/21/2023 08:20 AM 31 27 - 38 seconds Final   08/22/2022 01:05 PM 30 26 - 39 sec Final     Comment:       THE APTT IS NO LONGER USED FOR MONITORING     UNFRACTIONATED HEPARIN THERAPY.    FOR MONITORING HEPARIN THERAPY,     USE THE HEPARIN ASSAY.     08/21/2021 07:08 AM 24 (L) 25 - 35 sec Final     Comment:       THE " "APTT IS NO LONGER USED FOR MONITORING     UNFRACTIONATED HEPARIN THERAPY.    FOR MONITORING HEPARIN THERAPY,     USE THE HEPARIN ASSAY.     2021 08:43 AM 28 25 - 35 sec Final     Comment:       THE APTT IS NO LONGER USED FOR MONITORING     UNFRACTIONATED HEPARIN THERAPY.    FOR MONITORING HEPARIN THERAPY,     USE THE HEPARIN ASSAY.       Medication Documentation Review Audit       Reviewed by Megan Baltazar MA (Medical Assistant) on 25 at 1133      Medication Order Taking? Sig Documenting Provider Last Dose Status   alfuzosin (Uroxatral) 10 mg 24 hr tablet [id number] Yes Take 1 tablet (10 mg) by mouth early in the morning.. Historical Provider, MD  Active   aspirin 81 mg EC tablet [id number] Yes Take 1 tablet (81 mg) by mouth once daily. Historical Provider, MD  Active   Basaglar KwikPen U-100 Insulin 100 unit/mL (3 mL) pen [id number] Yes Inject 20 Units under the skin once daily at bedtime. Jimmy Helm MD  Active   BD Ultra-Fine Short Pen Needle 31 gauge x 5/16\" needle [id number] Yes USE ONCE DAILY Jimmy Helm MD  Active   calcitriol (Rocaltrol) 0.25 mcg capsule [id number] Yes TAKE 1 CAPSULE BY MOUTH EVERY other day Siddhartha Burnett MD  Active   carbidopa-levodopa (Sinemet)  mg tablet [id number]  Take by mouth. Historical Provider, MD   25 2359   cyanocobalamin (Vitamin B-12) 1,000 mcg tablet [id number] Yes Take 2 tablets (2,000 mcg) by mouth once daily. Historical Provider, MD  Active   dapagliflozin propanediol (Farxiga) 10 mg [id number] Yes Take 1 tablet (10 mg) by mouth once daily in the morning. Take before meals. Wilfrido Goodwin MD  Active   dilTIAZem CD (Cardizem CD) 240 mg 24 hr capsule [id number] Yes TAKE 1 CAPSULE BY MOUTH DAILY Wilfrido Goodwin MD  Active   empagliflozin (Jardiance) 10 mg [id number]  Take 1 tablet (10 mg) by mouth once daily.   Patient not taking: Reported on 2025    Jimmy Helm MD  Active   folic acid 0.8 mg capsule [id number] Yes Take 1 tablet by mouth " "once daily. Historical Provider, MD  Active   gabapentin (Neurontin) 100 mg capsule [id number] Yes Take 1 capsule (100 mg) by mouth 2 times a day. Jimmy Helm MD  Active   glimepiride (Amaryl) 4 mg tablet [id number] Yes Take 1 tablet (4 mg) by mouth 2 times a day. Jimmy Helm MD  Active   hydroCHLOROthiazide (HYDRODiuril) 25 mg tablet [id number] Yes TAKE 1 TABLET BY MOUTH ONCE DAILY Jimmy Helm MD  Active   isosorbide mononitrate ER (Imdur) 30 mg 24 hr tablet [id number] Yes TAKE 1 TABLET BY MOUTH DAILY Wilfrido Goodwin MD  Active   lisinopril 30 mg tablet [id number] Yes TAKE 1 TABLET BY MOUTH ONCE DAILY Jimmy Helm MD  Active   nitroglycerin (Nitrostat) 0.4 mg SL tablet [id number]  Place 1 tablet (0.4 mg) under the tongue every 5 minutes if needed for chest pain. Wilfrido Goodwin MD   25 3613   pantoprazole (ProtoNix) 40 mg EC tablet [id number] Yes TAKE 1 TABLET BY MOUTH ONCE DAILY Dixon Murray MD  Active   simvastatin (Zocor) 20 mg tablet [id number] Yes TAKE 1 TABLET BY MOUTH EVERY DAY AT BEDTIME Wilfrido Goodwin MD  Active   tamsulosin (Flomax) 0.4 mg 24 hr capsule [id number] Yes Take 1 capsule (0.4 mg) by mouth once daily. Historical Provider, MD  Active                   Assessment/Plan    1) MDS/RARS  -has symptomatic anemia  -failed aranesp  -currently on luspatercept Q3 weeks  -currently being dosed at 1.75 mg/kg  -3 weeks ago he did not receive a dose as hgb was >11.5  -he says he recently got out of his car in a \"twisted fashion\"--ended up spraining his left hip, so that he now gets a bothersome tingling sensation in his left foot if he bears any weight so he arrives today in a wheelchair  -labs to be done today include CBC, COMP  -results reviewed--wbc 6.9, hgb 9.4 plt 175,000, creatinine 1.94, calcium 9.2, alk phos 70, AST 11, total bili 1.3, ALT 3  -benefits, risks, potential morbidity related to luspatercept were reviewed with Chas and he provided informed consent to jocelyne  -jeannie " went on to receive luspatercept 1.75 mg/kg subcutaneous  -he knows that he will not receive a dose if and when his hgb is >=11.5  -he will continue to return Q21 days     2) hereditary hemochromatosis  -had been on therapeutic phlebotomy in the past, however, given current marked anemia, it has not been wise to continue with phlebotomy     3) hyperlipidemia  -on simvastatin     4) hypertension  -on hydrochlorothiazide  -on lisinopril  -on Cartia XT     5) diabetes  -on glimepiride  -on metformin  -on levemir     6) PAD  -on ASA  -s/p multiple stents     Problem List Items Addressed This Visit             ICD-10-CM    MDS (myelodysplastic syndrome), low grade (Multi) D46.Z    Relevant Orders    Infusion Appointment Request [facility] INFUSION    Infusion Appointment Request [facility] INFUSION    Clinic Appointment Request Chemo Follow Up; MIKI BOWSER; [facility]ON    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

## 2025-01-31 ENCOUNTER — PATIENT MESSAGE (OUTPATIENT)
Dept: PRIMARY CARE | Facility: CLINIC | Age: 80
End: 2025-01-31
Payer: MEDICARE

## 2025-01-31 DIAGNOSIS — Z79.4 DIABETES MELLITUS TREATED WITH INSULIN AND ORAL MEDICATION (MULTI): ICD-10-CM

## 2025-01-31 DIAGNOSIS — E11.9 DIABETES MELLITUS TREATED WITH INSULIN AND ORAL MEDICATION (MULTI): ICD-10-CM

## 2025-01-31 DIAGNOSIS — Z79.84 DIABETES MELLITUS TREATED WITH INSULIN AND ORAL MEDICATION (MULTI): ICD-10-CM

## 2025-01-31 DIAGNOSIS — G62.9 NEUROPATHY: ICD-10-CM

## 2025-01-31 RX ORDER — GABAPENTIN 100 MG/1
200 CAPSULE ORAL 2 TIMES DAILY
Qty: 120 CAPSULE | Refills: 1 | Status: SHIPPED | OUTPATIENT
Start: 2025-01-31 | End: 2025-07-30

## 2025-01-31 RX ORDER — INSULIN GLARGINE 100 [IU]/ML
20 INJECTION, SOLUTION SUBCUTANEOUS NIGHTLY
Qty: 15 ML | Refills: 1 | Status: SHIPPED | OUTPATIENT
Start: 2025-01-31

## 2025-02-07 ENCOUNTER — PATIENT MESSAGE (OUTPATIENT)
Dept: PRIMARY CARE | Facility: CLINIC | Age: 80
End: 2025-02-07
Payer: MEDICARE

## 2025-02-07 DIAGNOSIS — Z79.84 DIABETES MELLITUS TREATED WITH INSULIN AND ORAL MEDICATION (MULTI): ICD-10-CM

## 2025-02-07 DIAGNOSIS — Z79.4 DIABETES MELLITUS TREATED WITH INSULIN AND ORAL MEDICATION (MULTI): ICD-10-CM

## 2025-02-07 DIAGNOSIS — E11.9 DIABETES MELLITUS TREATED WITH INSULIN AND ORAL MEDICATION (MULTI): ICD-10-CM

## 2025-02-08 RX ORDER — INSULIN GLARGINE 100 [IU]/ML
20 INJECTION, SOLUTION SUBCUTANEOUS NIGHTLY
Qty: 15 ML | Refills: 1 | Status: SHIPPED | OUTPATIENT
Start: 2025-02-08

## 2025-02-10 ENCOUNTER — HOSPITAL ENCOUNTER (OUTPATIENT)
Dept: RADIOLOGY | Facility: CLINIC | Age: 80
End: 2025-02-10
Payer: MEDICARE

## 2025-02-10 ENCOUNTER — APPOINTMENT (OUTPATIENT)
Dept: PRIMARY CARE | Facility: CLINIC | Age: 80
End: 2025-02-10
Payer: MEDICARE

## 2025-02-10 ENCOUNTER — DOCUMENTATION (OUTPATIENT)
Dept: PRIMARY CARE | Facility: CLINIC | Age: 80
End: 2025-02-10

## 2025-02-10 VITALS
HEIGHT: 66 IN | DIASTOLIC BLOOD PRESSURE: 69 MMHG | TEMPERATURE: 98.2 F | OXYGEN SATURATION: 95 % | RESPIRATION RATE: 18 BRPM | HEART RATE: 89 BPM | WEIGHT: 172 LBS | SYSTOLIC BLOOD PRESSURE: 144 MMHG | BODY MASS INDEX: 27.64 KG/M2

## 2025-02-10 DIAGNOSIS — I25.10 CORONARY ARTERY DISEASE INVOLVING NATIVE CORONARY ARTERY OF NATIVE HEART, UNSPECIFIED WHETHER ANGINA PRESENT: ICD-10-CM

## 2025-02-10 DIAGNOSIS — D46.Z MDS (MYELODYSPLASTIC SYNDROME), LOW GRADE (MULTI): ICD-10-CM

## 2025-02-10 DIAGNOSIS — Z79.4 DIABETES MELLITUS TREATED WITH INSULIN AND ORAL MEDICATION (MULTI): ICD-10-CM

## 2025-02-10 DIAGNOSIS — G20.A1 PARKINSON'S DISEASE WITHOUT FLUCTUATING MANIFESTATIONS, UNSPECIFIED WHETHER DYSKINESIA PRESENT: Primary | ICD-10-CM

## 2025-02-10 DIAGNOSIS — Z79.84 DIABETES MELLITUS TREATED WITH INSULIN AND ORAL MEDICATION (MULTI): ICD-10-CM

## 2025-02-10 DIAGNOSIS — I10 ESSENTIAL HYPERTENSION: ICD-10-CM

## 2025-02-10 DIAGNOSIS — I73.9 PVD (PERIPHERAL VASCULAR DISEASE) (CMS-HCC): ICD-10-CM

## 2025-02-10 DIAGNOSIS — M79.2 NERVE PAIN: ICD-10-CM

## 2025-02-10 DIAGNOSIS — R26.81 UNSTEADY GAIT: ICD-10-CM

## 2025-02-10 DIAGNOSIS — E11.9 DIABETES MELLITUS TREATED WITH INSULIN AND ORAL MEDICATION (MULTI): ICD-10-CM

## 2025-02-10 PROBLEM — M17.0 OSTEOARTHRITIS OF BOTH KNEES: Status: ACTIVE | Noted: 2025-02-10

## 2025-02-10 PROCEDURE — 99214 OFFICE O/P EST MOD 30 MIN: CPT | Performed by: NURSE PRACTITIONER

## 2025-02-10 RX ORDER — INSULIN GLARGINE 100 [IU]/ML
20 INJECTION, SOLUTION SUBCUTANEOUS NIGHTLY
Qty: 15 ML | Refills: 4 | Status: SHIPPED | OUTPATIENT
Start: 2025-02-10 | End: 2026-02-10

## 2025-02-10 ASSESSMENT — ENCOUNTER SYMPTOMS
PALPITATIONS: 0
FATIGUE: 0
HEADACHES: 0
SHORTNESS OF BREATH: 0
DIARRHEA: 0
WEAKNESS: 0
COUGH: 0
ABDOMINAL PAIN: 0
FEVER: 0
CONSTIPATION: 0

## 2025-02-10 ASSESSMENT — PATIENT HEALTH QUESTIONNAIRE - PHQ9
SUM OF ALL RESPONSES TO PHQ QUESTIONS 1-9: 5
6. FEELING BAD ABOUT YOURSELF - OR THAT YOU ARE A FAILURE OR HAVE LET YOURSELF OR YOUR FAMILY DOWN: NOT AT ALL
8. MOVING OR SPEAKING SO SLOWLY THAT OTHER PEOPLE COULD HAVE NOTICED. OR THE OPPOSITE, BEING SO FIGETY OR RESTLESS THAT YOU HAVE BEEN MOVING AROUND A LOT MORE THAN USUAL: NOT AT ALL
SUM OF ALL RESPONSES TO PHQ9 QUESTIONS 1 AND 2: 0
3. TROUBLE FALLING OR STAYING ASLEEP OR SLEEPING TOO MUCH: NEARLY EVERY DAY
2. FEELING DOWN, DEPRESSED OR HOPELESS: NOT AT ALL
1. LITTLE INTEREST OR PLEASURE IN DOING THINGS: NOT AT ALL
4. FEELING TIRED OR HAVING LITTLE ENERGY: MORE THAN HALF THE DAYS
9. THOUGHTS THAT YOU WOULD BE BETTER OFF DEAD, OR OF HURTING YOURSELF: NOT AT ALL
7. TROUBLE CONCENTRATING ON THINGS, SUCH AS READING THE NEWSPAPER OR WATCHING TELEVISION: NOT AT ALL
5. POOR APPETITE OR OVEREATING: NOT AT ALL

## 2025-02-10 NOTE — PROGRESS NOTES
Subjective   Chas Melgar is a 79 y.o. male who presents for Establish Care, Depression, and Tremors. The patient was recently diagnosed with Parkinson's disease. His wife, who accompanies him to today's visit, believes he may be suffering from depression. The patient himself does not feel he is depressed, though he does admit to being quite fatigued in general. He states he often naps for 1-2 hours at a time at least once daily and twice daily if he is able. He does report having difficulty falling asleep at night often.    HPI Patient follows with multiple specialists:  Cardiology: Dr. Goodwin  Neurology: Erica Cuevas, APRN-CNP [facility]   Hem/Onc: Dr. Niels Brewer: Back  Dr. Colindres: knees  Dr. FIONA Ramirez: shoulder  Dr. CHAO Ramirez: hip  : Dr. Carmen Burnett: Nephrology  Dr. Mingo Arvizu: Podiatry  Dr. HILARY Lopez, ENT (for thyroid nodules)  GI: Dr. Wall  Review of Systems   Constitutional:  Negative for fatigue and fever.   Respiratory:  Negative for cough and shortness of breath.    Cardiovascular:  Negative for chest pain and palpitations.   Gastrointestinal:  Negative for abdominal pain, constipation and diarrhea.   Neurological:  Negative for weakness and headaches.   Objective     Physical Exam  Vitals reviewed.   Constitutional:       Appearance: Normal appearance. He is not ill-appearing.   HENT:      Head: Normocephalic.   Eyes:      Conjunctiva/sclera: Conjunctivae normal.   Cardiovascular:      Rate and Rhythm: Normal rate and regular rhythm.      Pulses: Normal pulses.      Heart sounds: No murmur heard.  Pulmonary:      Effort: Pulmonary effort is normal.      Breath sounds: Normal breath sounds.   Abdominal:      General: Bowel sounds are normal.      Palpations: Abdomen is soft.   Musculoskeletal:      Cervical back: Neck supple.      Right lower leg: No edema.      Left lower leg: No edema.   Lymphadenopathy:      Cervical: No cervical adenopathy.   Skin:    "  General: Skin is warm and dry.   Neurological:      General: No focal deficit present.      Mental Status: He is alert and oriented to person, place, and time.   Psychiatric:         Mood and Affect: Mood normal.         Thought Content: Thought content normal.     /69   Pulse 89   Temp 36.8 °C (98.2 °F)   Resp 18   Ht 1.676 m (5' 6\")   Wt 78 kg (172 lb)   SpO2 95%   BMI 27.76 kg/m²     Assessment/Plan     Problem List Items Addressed This Visit       Coronary artery disease involving native coronary artery of native heart    Overview     Follows w/ Dr. Goodwin. H/o CABG x5 (approx. 2001 per pt)  Managed w/ ASA, Statin, Imdur, Diltiazem, Farxiga, and PRN nitrostat,          Essential hypertension    Overview     Managed w/ lisinopril 30 mg, diltiazem  mg, Imdur 30 mg, hydrochlorothiazide 20 mg         PVD (peripheral vascular disease) (CMS-[facility])    Overview     Follows w/ Dr. Goodwin.  Taking Statin, ASA 81 mg         Diabetes mellitus treated with insulin and oral medication (Multi)    Relevant Medications    insulin glargine (Basaglar KwikPen U-100 Insulin) 100 unit/mL (3 mL) pen    Other Relevant Orders    Follow Up In [facility] - PCP - Established    MDS (myelodysplastic syndrome), low grade (Multi)    Overview     Follows w/ Dr. Miki Bowser         Unsteady gait    Overview     Uses a wheeled walker to ambulate short distances. He uses a wheelchair for longer distances.         Parkinson's disease without fluctuating manifestations - Primary    Overview     Diagnosed 1/2024 by JESSICA Montejo-CNP at [facility] w/ Dr. Nahid Sesay.   Started Sinemet  mg          Nerve pain    Overview     This is on medial and dorsal surface of patient's left foot. Following w/ Dr. Mingo Arvizu, Podiatry            "

## 2025-02-13 ENCOUNTER — APPOINTMENT (OUTPATIENT)
Dept: NEUROLOGY | Facility: CLINIC | Age: 80
End: 2025-02-13
Payer: MEDICARE

## 2025-02-14 ENCOUNTER — HOSPITAL ENCOUNTER (OUTPATIENT)
Dept: RADIOLOGY | Facility: HOSPITAL | Age: 80
Discharge: HOME | End: 2025-02-14
Payer: MEDICARE

## 2025-02-14 DIAGNOSIS — D49.511 NEOPLASM OF UNSPECIFIED BEHAVIOR OF RIGHT KIDNEY: ICD-10-CM

## 2025-02-14 PROCEDURE — 76770 US EXAM ABDO BACK WALL COMP: CPT

## 2025-02-17 ENCOUNTER — LAB (OUTPATIENT)
Dept: LAB | Facility: CLINIC | Age: 80
End: 2025-02-17
Payer: MEDICARE

## 2025-02-17 ENCOUNTER — INFUSION (OUTPATIENT)
Dept: HEMATOLOGY/ONCOLOGY | Facility: CLINIC | Age: 80
End: 2025-02-17
Payer: MEDICARE

## 2025-02-17 VITALS
DIASTOLIC BLOOD PRESSURE: 63 MMHG | BODY MASS INDEX: 27.9 KG/M2 | OXYGEN SATURATION: 96 % | SYSTOLIC BLOOD PRESSURE: 121 MMHG | HEART RATE: 68 BPM | TEMPERATURE: 98.1 F | WEIGHT: 172.84 LBS | RESPIRATION RATE: 17 BRPM

## 2025-02-17 DIAGNOSIS — D46.Z MDS (MYELODYSPLASTIC SYNDROME), LOW GRADE (MULTI): ICD-10-CM

## 2025-02-17 DIAGNOSIS — D46.1 REFRACTORY ANEMIA WITH RING SIDEROBLASTS (MULTI): ICD-10-CM

## 2025-02-17 LAB
ALBUMIN SERPL BCP-MCNC: 4.5 G/DL (ref 3.4–5)
ALP SERPL-CCNC: 71 U/L (ref 33–136)
ALT SERPL W P-5'-P-CCNC: <3 U/L (ref 10–52)
ANION GAP SERPL CALC-SCNC: 15 MMOL/L (ref 10–20)
AST SERPL W P-5'-P-CCNC: 11 U/L (ref 9–39)
BASO STIPL BLD QL SMEAR: PRESENT
BASOPHILS # BLD MANUAL: 0.09 X10*3/UL (ref 0–0.1)
BASOPHILS NFR BLD MANUAL: 1 %
BILIRUB SERPL-MCNC: 0.8 MG/DL (ref 0–1.2)
BUN SERPL-MCNC: 36 MG/DL (ref 6–23)
CALCIUM SERPL-MCNC: 9.4 MG/DL (ref 8.6–10.3)
CHLORIDE SERPL-SCNC: 105 MMOL/L (ref 98–107)
CO2 SERPL-SCNC: 27 MMOL/L (ref 21–32)
CREAT SERPL-MCNC: 2.01 MG/DL (ref 0.5–1.3)
DACRYOCYTES BLD QL SMEAR: ABNORMAL
EGFRCR SERPLBLD CKD-EPI 2021: 33 ML/MIN/1.73M*2
EOSINOPHIL # BLD MANUAL: 0.6 X10*3/UL (ref 0–0.4)
EOSINOPHIL NFR BLD MANUAL: 7 %
ERYTHROCYTE [DISTWIDTH] IN BLOOD BY AUTOMATED COUNT: 21.8 % (ref 11.5–14.5)
GLUCOSE SERPL-MCNC: 182 MG/DL (ref 74–99)
HCT VFR BLD AUTO: 30 % (ref 41–52)
HGB BLD-MCNC: 9.9 G/DL (ref 13.5–17.5)
IMM GRANULOCYTES # BLD AUTO: 0.09 X10*3/UL (ref 0–0.5)
IMM GRANULOCYTES NFR BLD AUTO: 1 % (ref 0–0.9)
LYMPHOCYTES # BLD MANUAL: 1.36 X10*3/UL (ref 0.8–3)
LYMPHOCYTES NFR BLD MANUAL: 16 %
MCH RBC QN AUTO: 37.4 PG (ref 26–34)
MCHC RBC AUTO-ENTMCNC: 33 G/DL (ref 32–36)
MCV RBC AUTO: 113 FL (ref 80–100)
METAMYELOCYTES # BLD MANUAL: 0.17 X10*3/UL
METAMYELOCYTES NFR BLD MANUAL: 2 %
MONOCYTES # BLD MANUAL: 0.34 X10*3/UL (ref 0.05–0.8)
MONOCYTES NFR BLD MANUAL: 4 %
NEUTROPHILS # BLD MANUAL: 5.96 X10*3/UL (ref 1.6–5.5)
NEUTS BAND # BLD MANUAL: 0.26 X10*3/UL (ref 0–0.5)
NEUTS BAND NFR BLD MANUAL: 3 %
NEUTS SEG # BLD MANUAL: 5.7 X10*3/UL (ref 1.6–5)
NEUTS SEG NFR BLD MANUAL: 67 %
NRBC BLD MANUAL-RTO: 1 % (ref 0–0)
NRBC BLD-RTO: ABNORMAL /100{WBCS}
OVALOCYTES BLD QL SMEAR: ABNORMAL
PATH REVIEW-CBC DIFFERENTIAL: NORMAL
PLATELET # BLD AUTO: 196 X10*3/UL (ref 150–450)
POLYCHROMASIA BLD QL SMEAR: ABNORMAL
POTASSIUM SERPL-SCNC: 4.6 MMOL/L (ref 3.5–5.3)
PROT SERPL-MCNC: 7.1 G/DL (ref 6.4–8.2)
RBC # BLD AUTO: 2.65 X10*6/UL (ref 4.5–5.9)
RBC MORPH BLD: ABNORMAL
SODIUM SERPL-SCNC: 142 MMOL/L (ref 136–145)
TOTAL CELLS COUNTED BLD: 100
WBC # BLD AUTO: 8.5 X10*3/UL (ref 4.4–11.3)

## 2025-02-17 PROCEDURE — 2500000004 HC RX 250 GENERAL PHARMACY W/ HCPCS (ALT 636 FOR OP/ED): Mod: JW,TB | Performed by: INTERNAL MEDICINE

## 2025-02-17 PROCEDURE — 85007 BL SMEAR W/DIFF WBC COUNT: CPT

## 2025-02-17 PROCEDURE — 96372 THER/PROPH/DIAG INJ SC/IM: CPT

## 2025-02-17 PROCEDURE — 85027 COMPLETE CBC AUTOMATED: CPT

## 2025-02-17 PROCEDURE — 80053 COMPREHEN METABOLIC PANEL: CPT

## 2025-02-17 PROCEDURE — 36415 COLL VENOUS BLD VENIPUNCTURE: CPT

## 2025-02-17 RX ORDER — EPINEPHRINE 0.3 MG/.3ML
0.3 INJECTION SUBCUTANEOUS EVERY 5 MIN PRN
OUTPATIENT
Start: 2025-03-10

## 2025-02-17 RX ORDER — ALBUTEROL SULFATE 0.83 MG/ML
3 SOLUTION RESPIRATORY (INHALATION) AS NEEDED
OUTPATIENT
Start: 2025-03-10

## 2025-02-17 RX ORDER — CEFADROXIL 500 MG/1
1 CAPSULE ORAL
COMMUNITY
Start: 2025-02-11

## 2025-02-17 RX ORDER — DIPHENHYDRAMINE HYDROCHLORIDE 50 MG/ML
50 INJECTION INTRAMUSCULAR; INTRAVENOUS AS NEEDED
OUTPATIENT
Start: 2025-03-10

## 2025-02-17 RX ORDER — FINASTERIDE 5 MG/1
1 TABLET, FILM COATED ORAL
COMMUNITY
Start: 2025-02-11

## 2025-02-17 RX ORDER — FAMOTIDINE 10 MG/ML
20 INJECTION, SOLUTION INTRAVENOUS ONCE AS NEEDED
OUTPATIENT
Start: 2025-03-10

## 2025-02-17 RX ADMIN — LUSPATERCEPT 137.5 MG: 75 INJECTION, POWDER, LYOPHILIZED, FOR SOLUTION SUBCUTANEOUS at 11:23

## 2025-02-17 ASSESSMENT — PAIN SCALES - GENERAL: PAINLEVEL_OUTOF10: 4

## 2025-02-18 ENCOUNTER — PATIENT OUTREACH (OUTPATIENT)
Dept: PRIMARY CARE | Facility: CLINIC | Age: 80
End: 2025-02-18
Payer: MEDICARE

## 2025-02-18 DIAGNOSIS — G20.A1 PARKINSON'S DISEASE WITHOUT FLUCTUATING MANIFESTATIONS, UNSPECIFIED WHETHER DYSKINESIA PRESENT: ICD-10-CM

## 2025-02-18 DIAGNOSIS — D46.Z MDS (MYELODYSPLASTIC SYNDROME), LOW GRADE (MULTI): ICD-10-CM

## 2025-02-18 NOTE — PROGRESS NOTES
Patient introduced to [facility] Services  Patient opted into services on: 2/18/25  Services will be performed under the direction of PCP: RITA Peñaloza  Patient has planned AWV/Follow up on:   POC will be derived from provider's comprehensive assessment and outlined plan of care found in AWV or other follow up.

## 2025-03-10 ENCOUNTER — INFUSION (OUTPATIENT)
Dept: HEMATOLOGY/ONCOLOGY | Facility: CLINIC | Age: 80
End: 2025-03-10
Payer: MEDICARE

## 2025-03-10 ENCOUNTER — OFFICE VISIT (OUTPATIENT)
Dept: HEMATOLOGY/ONCOLOGY | Facility: CLINIC | Age: 80
End: 2025-03-10
Payer: MEDICARE

## 2025-03-10 ENCOUNTER — LAB (OUTPATIENT)
Dept: LAB | Facility: CLINIC | Age: 80
End: 2025-03-10
Payer: MEDICARE

## 2025-03-10 VITALS
RESPIRATION RATE: 16 BRPM | TEMPERATURE: 96.8 F | DIASTOLIC BLOOD PRESSURE: 62 MMHG | HEART RATE: 90 BPM | OXYGEN SATURATION: 93 % | SYSTOLIC BLOOD PRESSURE: 100 MMHG | WEIGHT: 171.52 LBS | BODY MASS INDEX: 27.68 KG/M2

## 2025-03-10 DIAGNOSIS — E83.110 HEREDITARY HEMOCHROMATOSIS (CMS-HCC): ICD-10-CM

## 2025-03-10 DIAGNOSIS — D46.Z MDS (MYELODYSPLASTIC SYNDROME), LOW GRADE (MULTI): Primary | ICD-10-CM

## 2025-03-10 DIAGNOSIS — Z79.4 DIABETES MELLITUS DUE TO UNDERLYING CONDITION WITH HYPERGLYCEMIA, WITH LONG-TERM CURRENT USE OF INSULIN: ICD-10-CM

## 2025-03-10 DIAGNOSIS — D46.Z MDS (MYELODYSPLASTIC SYNDROME), LOW GRADE (MULTI): ICD-10-CM

## 2025-03-10 DIAGNOSIS — D46.1 REFRACTORY ANEMIA WITH RING SIDEROBLASTS (MULTI): ICD-10-CM

## 2025-03-10 DIAGNOSIS — E08.65 DIABETES MELLITUS DUE TO UNDERLYING CONDITION WITH HYPERGLYCEMIA, WITH LONG-TERM CURRENT USE OF INSULIN: ICD-10-CM

## 2025-03-10 DIAGNOSIS — I73.9 PVD (PERIPHERAL VASCULAR DISEASE) (CMS-HCC): ICD-10-CM

## 2025-03-10 DIAGNOSIS — E78.2 MIXED HYPERLIPIDEMIA: ICD-10-CM

## 2025-03-10 DIAGNOSIS — I10 ESSENTIAL HYPERTENSION: ICD-10-CM

## 2025-03-10 LAB
ALBUMIN SERPL BCP-MCNC: 4.5 G/DL (ref 3.4–5)
ALP SERPL-CCNC: 78 U/L (ref 33–136)
ALT SERPL W P-5'-P-CCNC: <3 U/L (ref 10–52)
ANION GAP SERPL CALC-SCNC: 14 MMOL/L (ref 10–20)
AST SERPL W P-5'-P-CCNC: 11 U/L (ref 9–39)
BASOPHILS # BLD AUTO: 0.12 X10*3/UL (ref 0–0.1)
BASOPHILS NFR BLD AUTO: 1.5 %
BILIRUB SERPL-MCNC: 1.1 MG/DL (ref 0–1.2)
BUN SERPL-MCNC: 37 MG/DL (ref 6–23)
CALCIUM SERPL-MCNC: 9.4 MG/DL (ref 8.6–10.3)
CHLORIDE SERPL-SCNC: 103 MMOL/L (ref 98–107)
CO2 SERPL-SCNC: 26 MMOL/L (ref 21–32)
CREAT SERPL-MCNC: 2.01 MG/DL (ref 0.5–1.3)
EGFRCR SERPLBLD CKD-EPI 2021: 33 ML/MIN/1.73M*2
EOSINOPHIL # BLD AUTO: 0.6 X10*3/UL (ref 0–0.4)
EOSINOPHIL NFR BLD AUTO: 7.6 %
ERYTHROCYTE [DISTWIDTH] IN BLOOD BY AUTOMATED COUNT: 21.6 % (ref 11.5–14.5)
GIANT PLATELETS BLD QL SMEAR: NORMAL
GLUCOSE SERPL-MCNC: 155 MG/DL (ref 74–99)
HCT VFR BLD AUTO: 32.9 % (ref 41–52)
HGB BLD-MCNC: 11.1 G/DL (ref 13.5–17.5)
IMM GRANULOCYTES # BLD AUTO: 0.06 X10*3/UL (ref 0–0.5)
IMM GRANULOCYTES NFR BLD AUTO: 0.8 % (ref 0–0.9)
LYMPHOCYTES # BLD AUTO: 1.63 X10*3/UL (ref 0.8–3)
LYMPHOCYTES NFR BLD AUTO: 20.5 %
MCH RBC QN AUTO: 38.1 PG (ref 26–34)
MCHC RBC AUTO-ENTMCNC: 33.7 G/DL (ref 32–36)
MCV RBC AUTO: 113 FL (ref 80–100)
MONOCYTES # BLD AUTO: 0.79 X10*3/UL (ref 0.05–0.8)
MONOCYTES NFR BLD AUTO: 9.9 %
NEUTROPHILS # BLD AUTO: 4.74 X10*3/UL (ref 1.6–5.5)
NEUTROPHILS NFR BLD AUTO: 59.7 %
NRBC BLD-RTO: ABNORMAL /100{WBCS}
OVALOCYTES BLD QL SMEAR: NORMAL
PLATELET # BLD AUTO: 165 X10*3/UL (ref 150–450)
POLYCHROMASIA BLD QL SMEAR: NORMAL
POTASSIUM SERPL-SCNC: 4.6 MMOL/L (ref 3.5–5.3)
PROT SERPL-MCNC: 7.1 G/DL (ref 6.4–8.2)
RBC # BLD AUTO: 2.91 X10*6/UL (ref 4.5–5.9)
RBC MORPH BLD: NORMAL
SODIUM SERPL-SCNC: 138 MMOL/L (ref 136–145)
WBC # BLD AUTO: 7.9 X10*3/UL (ref 4.4–11.3)

## 2025-03-10 PROCEDURE — 96372 THER/PROPH/DIAG INJ SC/IM: CPT

## 2025-03-10 PROCEDURE — G2211 COMPLEX E/M VISIT ADD ON: HCPCS | Performed by: INTERNAL MEDICINE

## 2025-03-10 PROCEDURE — 1126F AMNT PAIN NOTED NONE PRSNT: CPT | Performed by: INTERNAL MEDICINE

## 2025-03-10 PROCEDURE — 3078F DIAST BP <80 MM HG: CPT | Performed by: INTERNAL MEDICINE

## 2025-03-10 PROCEDURE — 36415 COLL VENOUS BLD VENIPUNCTURE: CPT

## 2025-03-10 PROCEDURE — 80053 COMPREHEN METABOLIC PANEL: CPT

## 2025-03-10 PROCEDURE — 85025 COMPLETE CBC W/AUTO DIFF WBC: CPT

## 2025-03-10 PROCEDURE — 2500000004 HC RX 250 GENERAL PHARMACY W/ HCPCS (ALT 636 FOR OP/ED): Mod: JW,TB | Performed by: INTERNAL MEDICINE

## 2025-03-10 PROCEDURE — 1123F ACP DISCUSS/DSCN MKR DOCD: CPT | Performed by: INTERNAL MEDICINE

## 2025-03-10 PROCEDURE — 99214 OFFICE O/P EST MOD 30 MIN: CPT | Mod: 25 | Performed by: INTERNAL MEDICINE

## 2025-03-10 PROCEDURE — 1159F MED LIST DOCD IN RCRD: CPT | Performed by: INTERNAL MEDICINE

## 2025-03-10 PROCEDURE — 1160F RVW MEDS BY RX/DR IN RCRD: CPT | Performed by: INTERNAL MEDICINE

## 2025-03-10 PROCEDURE — 3074F SYST BP LT 130 MM HG: CPT | Performed by: INTERNAL MEDICINE

## 2025-03-10 PROCEDURE — 99214 OFFICE O/P EST MOD 30 MIN: CPT | Performed by: INTERNAL MEDICINE

## 2025-03-10 RX ORDER — FAMOTIDINE 10 MG/ML
20 INJECTION, SOLUTION INTRAVENOUS ONCE AS NEEDED
Status: DISCONTINUED | OUTPATIENT
Start: 2025-03-10 | End: 2025-03-10 | Stop reason: HOSPADM

## 2025-03-10 RX ORDER — FAMOTIDINE 10 MG/ML
20 INJECTION, SOLUTION INTRAVENOUS ONCE AS NEEDED
OUTPATIENT
Start: 2025-03-31

## 2025-03-10 RX ORDER — DIPHENHYDRAMINE HYDROCHLORIDE 50 MG/ML
50 INJECTION INTRAMUSCULAR; INTRAVENOUS AS NEEDED
OUTPATIENT
Start: 2025-03-31

## 2025-03-10 RX ORDER — EPINEPHRINE 0.3 MG/.3ML
0.3 INJECTION SUBCUTANEOUS EVERY 5 MIN PRN
Status: DISCONTINUED | OUTPATIENT
Start: 2025-03-10 | End: 2025-03-10 | Stop reason: HOSPADM

## 2025-03-10 RX ORDER — ALBUTEROL SULFATE 0.83 MG/ML
3 SOLUTION RESPIRATORY (INHALATION) AS NEEDED
Status: DISCONTINUED | OUTPATIENT
Start: 2025-03-10 | End: 2025-03-10 | Stop reason: HOSPADM

## 2025-03-10 RX ORDER — EPINEPHRINE 0.3 MG/.3ML
0.3 INJECTION SUBCUTANEOUS EVERY 5 MIN PRN
OUTPATIENT
Start: 2025-03-31

## 2025-03-10 RX ORDER — ALBUTEROL SULFATE 0.83 MG/ML
3 SOLUTION RESPIRATORY (INHALATION) AS NEEDED
OUTPATIENT
Start: 2025-03-31

## 2025-03-10 RX ORDER — DIPHENHYDRAMINE HYDROCHLORIDE 50 MG/ML
50 INJECTION INTRAMUSCULAR; INTRAVENOUS AS NEEDED
Status: DISCONTINUED | OUTPATIENT
Start: 2025-03-10 | End: 2025-03-10 | Stop reason: HOSPADM

## 2025-03-10 RX ADMIN — LUSPATERCEPT 135 MG: 75 INJECTION, POWDER, LYOPHILIZED, FOR SOLUTION SUBCUTANEOUS at 12:42

## 2025-03-10 ASSESSMENT — PAIN SCALES - GENERAL: PAINLEVEL_OUTOF10: 0-NO PAIN

## 2025-03-10 NOTE — PROGRESS NOTES
Patient ID: Chas Melgar is a 79 y.o. male.  Referring Physician: Miki Bowser MD  [id number]  [place] [address]  Primary Care Provider: RITA Rivera  Visit Type: Follow Up      Subjective    HPI How was my blood count?  My energy levels have picked up    Review of Systems   Constitutional: Negative.    HENT:  Negative.     Eyes: Negative.    Respiratory: Negative.     Cardiovascular: Negative.    Gastrointestinal: Negative.    Endocrine: Negative.    Genitourinary: Negative.     Musculoskeletal: Negative.    Skin: Negative.    Neurological: Negative.    Hematological: Negative.    Psychiatric/Behavioral: Negative.          Objective   BSA: 1.9 meters squared  /62 (BP Location: Right arm)   Pulse 90   Temp 36 °C (96.8 °F) (Temporal)   Resp 16   Wt 77.8 kg (171 lb 8.3 oz)   SpO2 93%   BMI 27.68 kg/m²      has a past medical history of Acute gastric ulcer with hemorrhage (09/07/2022), Acute upper respiratory infection, unspecified, Arthritis, Body mass index (BMI) 28.0-28.9, adult (10/19/2021), Cancer (Multi), Coronary artery disease, Diabetes mellitus (Multi), Encounter for other preprocedural examination (10/14/2021), Encounter for screening for malignant neoplasm of prostate, Impingement syndrome of unspecified shoulder (07/22/2021), Other abnormalities of breathing, 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[facility]), Other specified follicular disorders, Other specified postprocedural states (06/09/2021), Overweight (02/21/2022), Overweight (01/18/2022), Pain in right hip, Pain in right leg, Pain in unspecified hip (08/04/2021), Pain in unspecified shoulder (08/10/2021), Personal history of malignant neoplasm, unspecified (08/04/2021), Personal history of other diseases of male genital organs, Personal history of other diseases of the circulatory system (08/04/2021), Personal history of other diseases of the circulatory system  (08/04/2021), Personal history of other diseases of the digestive system (10/14/2021), Personal history of other diseases of the digestive system (02/26/2022), Personal history of other diseases of the digestive system, Personal history of other diseases of the musculoskeletal system and connective tissue (08/04/2021), Personal history of other diseases of the musculoskeletal system and connective tissue (08/05/2021), Personal history of other diseases of the respiratory system (11/17/2021), Personal history of other diseases of the respiratory system, Personal history of other diseases of the respiratory system, Personal history of other diseases of the respiratory system, Personal history of other diseases of urinary system (09/01/2022), Personal history of other endocrine, nutritional and metabolic disease (10/06/2021), Personal history of other endocrine, nutritional and metabolic disease (01/18/2022), Personal history of other malignant neoplasm of skin, Personal history of other specified conditions (09/01/2022), Personal history of other specified conditions, Personal history of other specified conditions, Persons encountering health services in other specified circumstances, Primary osteoarthritis, 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   has a past surgical history that includes Other surgical history (11/07/2022); Other surgical history (10/14/2021); Other surgical history (10/14/2021); Other surgical history (10/14/2021); Other surgical history (10/14/2021); Other surgical history (10/14/2021); Other surgical history (11/17/2021); CT angio aorta and bilateral iliofemoral runoff including without contrast if performed (10/27/2020); and Rotator cuff  repair.  Family History   Problem Relation Name Age of Onset    Diabetes Mother Melinda melgar     Coronary artery disease Father      Hypertension Other      Alzheimer's disease Other       Oncology History    No history exists.       Chas Melgar  reports that he quit smoking about 39 years ago. His smoking use included cigarettes. He started smoking about 59 years ago. He has a 40 pack-year smoking history. He has never used smokeless tobacco.  He  reports current alcohol use of about 2.0 standard drinks of alcohol per week.  He  reports no history of drug use.    Physical Exam  Vitals reviewed.   Constitutional:       Appearance: Normal appearance.      Comments: Arrives in a wheelchair   HENT:      Head: Normocephalic.      Mouth/Throat:      Mouth: Mucous membranes are moist.   Eyes:      Extraocular Movements: Extraocular movements intact.      Pupils: Pupils are equal, round, and reactive to light.   Cardiovascular:      Rate and Rhythm: Normal rate and regular rhythm.      Pulses: Normal pulses.      Heart sounds: Normal heart sounds.   Pulmonary:      Effort: Pulmonary effort is normal.      Breath sounds: Normal breath sounds.   Abdominal:      General: Bowel sounds are normal.      Palpations: Abdomen is soft.   Musculoskeletal:         General: Normal range of motion.      Cervical back: Normal range of motion and neck supple.   Skin:     General: Skin is warm.   Neurological:      General: No focal deficit present.      Mental Status: He is alert and oriented to person, place, and time.   Psychiatric:         Mood and Affect: Mood normal.         Behavior: Behavior normal.         WBC   Date/Time Value Ref Range Status   03/10/2025 10:13 AM 7.9 4.4 - 11.3 x10*3/uL Preliminary   02/17/2025 09:31 AM 8.5 4.4 - 11.3 x10*3/uL Final   01/27/2025 11:03 AM 6.9 4.4 - 11.3 x10*3/uL Final     nRBC   Date Value Ref Range Status   03/10/2025   Preliminary     Comment:     Not Measured   02/17/2025   Final      Comment:     Not Measured   01/06/2025   Final     Comment:     Not Measured     RBC   Date Value Ref Range Status   03/10/2025 2.91 (L) 4.50 - 5.90 x10*6/uL Preliminary   02/17/2025 2.65 (L) 4.50 - 5.90 x10*6/uL Final   01/27/2025 2.53 (L) 4.50 - 5.90 x10*6/uL Final     Hemoglobin   Date Value Ref Range Status   03/10/2025 11.1 (L) 13.5 - 17.5 g/dL Preliminary   02/17/2025 9.9 (L) 13.5 - 17.5 g/dL Final   01/27/2025 9.4 (L) 13.5 - 17.5 g/dL Final     Hematocrit   Date Value Ref Range Status   03/10/2025 32.9 (L) 41.0 - 52.0 % Preliminary   02/17/2025 30.0 (L) 41.0 - 52.0 % Final   01/27/2025 28.0 (L) 41.0 - 52.0 % Final     MCV   Date/Time Value Ref Range Status   03/10/2025 10:13  (H) 80 - 100 fL Preliminary   02/17/2025 09:31  (H) 80 - 100 fL Final   01/27/2025 11:03  (H) 80 - 100 fL Final     MCH   Date/Time Value Ref Range Status   03/10/2025 10:13 AM 38.1 (H) 26.0 - 34.0 pg Preliminary   02/17/2025 09:31 AM 37.4 (H) 26.0 - 34.0 pg Final   01/27/2025 11:03 AM 37.2 (H) 26.0 - 34.0 pg Final     MCHC   Date/Time Value Ref Range Status   03/10/2025 10:13 AM 33.7 32.0 - 36.0 g/dL Preliminary   02/17/2025 09:31 AM 33.0 32.0 - 36.0 g/dL Final   01/27/2025 11:03 AM 33.6 32.0 - 36.0 g/dL Final     RDW   Date/Time Value Ref Range Status   03/10/2025 10:13 AM 21.6 (H) 11.5 - 14.5 % Preliminary   02/17/2025 09:31 AM 21.8 (H) 11.5 - 14.5 % Final   01/27/2025 11:03 AM 21.8 (H) 11.5 - 14.5 % Final     Platelets   Date/Time Value Ref Range Status   03/10/2025 10:13  150 - 450 x10*3/uL Preliminary   02/17/2025 09:31  150 - 450 x10*3/uL Final   01/27/2025 11:03  150 - 450 x10*3/uL Final     MPV   Date/Time Value Ref Range Status   10/23/2023 01:06 PM 14.6 (H) 7.5 - 11.5 fL Final   10/02/2023 12:34 PM 14.5 (H) 7.5 - 11.5 fL Final     Neutrophils %   Date/Time Value Ref Range Status   01/27/2025 11:03 AM 57.7 40.0 - 80.0 % Final   01/06/2025 09:27 AM 61.0 40.0 - 80.0 % Final   12/16/2024  10:35 AM 62.2 40.0 - 80.0 % Final     Immature Granulocytes %, Automated   Date/Time Value Ref Range Status   02/17/2025 09:31 AM 1.0 (H) 0.0 - 0.9 % Final     Comment:     Immature Granulocyte Count (IG) includes promyelocytes, myelocytes and metamyelocytes but does not include bands. Percent differential counts (%) should be interpreted in the context of the absolute cell counts (cells/UL).   01/27/2025 11:03 AM 0.1 0.0 - 0.9 % Final     Comment:     Immature Granulocyte Count (IG) includes promyelocytes, myelocytes and metamyelocytes but does not include bands. Percent differential counts (%) should be interpreted in the context of the absolute cell counts (cells/UL).   01/06/2025 09:27 AM 0.3 0.0 - 0.9 % Final     Comment:     Immature Granulocyte Count (IG) includes promyelocytes, myelocytes and metamyelocytes but does not include bands. Percent differential counts (%) should be interpreted in the context of the absolute cell counts (cells/UL).     Lymphocytes %, Manual   Date/Time Value Ref Range Status   02/17/2025 09:31 AM 16.0 13.0 - 44.0 % Final     Lymphocytes %   Date/Time Value Ref Range Status   01/27/2025 11:03 AM 24.2 13.0 - 44.0 % Final   01/06/2025 09:27 AM 19.7 13.0 - 44.0 % Final   12/16/2024 10:35 AM 20.2 13.0 - 44.0 % Final     Monocytes %, Manual   Date/Time Value Ref Range Status   02/17/2025 09:31 AM 4.0 2.0 - 10.0 % Final     Monocytes %   Date/Time Value Ref Range Status   01/27/2025 11:03 AM 9.5 2.0 - 10.0 % Final   01/06/2025 09:27 AM 9.4 2.0 - 10.0 % Final   12/16/2024 10:35 AM 11.7 2.0 - 10.0 % Final     Eosinophils %, Manual   Date/Time Value Ref Range Status   02/17/2025 09:31 AM 7.0 0.0 - 6.0 % Final     Eosinophils %   Date/Time Value Ref Range Status   01/27/2025 11:03 AM 7.2 0.0 - 6.0 % Final   01/06/2025 09:27 AM 8.3 0.0 - 6.0 % Final   12/16/2024 10:35 AM 4.5 0.0 - 6.0 % Final     Basophils %, Manual   Date/Time Value Ref Range Status   02/17/2025 09:31 AM 1.0 0.0 - 2.0 % Final      Basophils %   Date/Time Value Ref Range Status   01/27/2025 11:03 AM 1.3 0.0 - 2.0 % Final   01/06/2025 09:27 AM 1.3 0.0 - 2.0 % Final   12/16/2024 10:35 AM 1.1 0.0 - 2.0 % Final     Neutrophils Absolute   Date/Time Value Ref Range Status   01/27/2025 11:03 AM 3.95 1.60 - 5.50 x10*3/uL Final     Comment:     Percent differential counts (%) should be interpreted in the context of the absolute cell counts (cells/uL).   01/06/2025 09:27 AM 4.62 1.60 - 5.50 x10*3/uL Final     Comment:     Percent differential counts (%) should be interpreted in the context of the absolute cell counts (cells/uL).   12/16/2024 10:35 AM 6.83 (H) 1.60 - 5.50 x10*3/uL Final     Comment:     Percent differential counts (%) should be interpreted in the context of the absolute cell counts (cells/uL).     Immature Granulocytes Absolute, Automated   Date/Time Value Ref Range Status   02/17/2025 09:31 AM 0.09 0.00 - 0.50 x10*3/uL Final   01/27/2025 11:03 AM 0.01 0.00 - 0.50 x10*3/uL Final   01/06/2025 09:27 AM 0.02 0.00 - 0.50 x10*3/uL Final     Lymphocytes Absolute   Date/Time Value Ref Range Status   01/27/2025 11:03 AM 1.66 0.80 - 3.00 x10*3/uL Final   01/06/2025 09:27 AM 1.49 0.80 - 3.00 x10*3/uL Final   12/16/2024 10:35 AM 2.22 0.80 - 3.00 x10*3/uL Final     Monocytes Absolute   Date/Time Value Ref Range Status   01/27/2025 11:03 AM 0.65 0.05 - 0.80 x10*3/uL Final   01/06/2025 09:27 AM 0.71 0.05 - 0.80 x10*3/uL Final   12/16/2024 10:35 AM 1.29 (H) 0.05 - 0.80 x10*3/uL Final     Eosinophils Absolute   Date/Time Value Ref Range Status   01/27/2025 11:03 AM 0.49 (H) 0.00 - 0.40 x10*3/uL Final   01/06/2025 09:27 AM 0.63 (H) 0.00 - 0.40 x10*3/uL Final   12/16/2024 10:35 AM 0.50 (H) 0.00 - 0.40 x10*3/uL Final     Eosinophils Absolute, Manual   Date/Time Value Ref Range Status   02/17/2025 09:31 AM 0.60 (H) 0.00 - 0.40 x10*3/uL Final     Basophils Absolute   Date/Time Value Ref Range Status   01/27/2025 11:03 AM 0.09 0.00 - 0.10 x10*3/uL Final  "  2025 09:27 AM 0.10 0.00 - 0.10 x10*3/uL Final     Comment:     Automated WBC differential has been confirmed by manual smear.   2024 10:35 AM 0.12 (H) 0.00 - 0.10 x10*3/uL Final     Basophils Absolute, Manual   Date/Time Value Ref Range Status   2025 09:31 AM 0.09 0.00 - 0.10 x10*3/uL Final       No components found for: \"PT\"  aPTT   Date/Time Value Ref Range Status   2023 08:20 AM 31 27 - 38 seconds Final   2022 01:05 PM 30 26 - 39 sec Final     Comment:       THE APTT IS NO LONGER USED FOR MONITORING     UNFRACTIONATED HEPARIN THERAPY.    FOR MONITORING HEPARIN THERAPY,     USE THE HEPARIN ASSAY.     2021 07:08 AM 24 (L) 25 - 35 sec Final     Comment:       THE APTT IS NO LONGER USED FOR MONITORING     UNFRACTIONATED HEPARIN THERAPY.    FOR MONITORING HEPARIN THERAPY,     USE THE HEPARIN ASSAY.     2021 08:43 AM 28 25 - 35 sec Final     Comment:       THE APTT IS NO LONGER USED FOR MONITORING     UNFRACTIONATED HEPARIN THERAPY.    FOR MONITORING HEPARIN THERAPY,     USE THE HEPARIN ASSAY.       Medication Documentation Review Audit       Reviewed by Megan Baltazar MA (Medical Assistant) on 03/10/25 at 1030      Medication Order Taking? Sig Documenting Provider Last Dose Status   alfuzosin (Uroxatral) 10 mg 24 hr tablet [id number] Yes Take 1 tablet (10 mg) by mouth early in the morning.. Historical Provider, MD  Active   aspirin 81 mg EC tablet [id number] Yes Take 1 tablet (81 mg) by mouth once daily. Historical Provider, MD  Active   BD Ultra-Fine Short Pen Needle 31 gauge x \" needle [id number]  USE ONCE DAILY   Patient not taking: Reported on 3/10/2025    Jimmy Helm MD  Active   calcitriol (Rocaltrol) 0.25 mcg capsule [id number] Yes TAKE 1 CAPSULE BY MOUTH EVERY other day Siddhartha Burnett MD  Active   carbidopa-levodopa (Sinemet)  mg tablet [id number]  Take by mouth. Historical Provider, MD   25 [id number]   cefadroxil (Duricef) 500 mg capsule [id number]  " Take 1 capsule (500 mg) by mouth every 12 hours.   Patient not taking: Reported on 3/10/2025    Historical Provider, MD  Active   cyanocobalamin (Vitamin B-12) 1,000 mcg tablet [id number] Yes Take 2 tablets (2,000 mcg) by mouth once daily. Historical Provider, MD  Active   dapagliflozin propanediol (Farxiga) 10 mg [id number] Yes Take 1 tablet (10 mg) by mouth once daily in the morning. Take before meals. Wilfrido Goodwin MD  Active   dilTIAZem CD (Cardizem CD) 240 mg 24 hr capsule [id number] Yes TAKE 1 CAPSULE BY MOUTH DAILY Wilfrido Goodwin MD  Active   finasteride (Proscar) 5 mg tablet [id number] Yes Take 1 tablet (5 mg) by mouth early in the morning.. Historical Provider, MD  Active   folic acid 0.8 mg capsule [id number] Yes Take 1 tablet by mouth once daily. Historical Provider, MD  Active   gabapentin (Neurontin) 100 mg capsule [id number] Yes Take 2 capsules (200 mg) by mouth 2 times a day. Jimmy Helm MD  Active   glimepiride (Amaryl) 4 mg tablet [id number] Yes Take 1 tablet (4 mg) by mouth 2 times a day. Jimmy Helm MD  Active   hydroCHLOROthiazide (HYDRODiuril) 25 mg tablet [id number] Yes TAKE 1 TABLET BY MOUTH ONCE DAILY Jimmy Helm MD  Active   insulin glargine (Basaglar KwikPen U-100 Insulin) 100 unit/mL (3 mL) pen [id number] Yes Inject 20 Units under the skin once daily at bedtime. Floridalma Piper, APRN-CNP  Active   isosorbide mononitrate ER (Imdur) 30 mg 24 hr tablet [id number] Yes TAKE 1 TABLET BY MOUTH DAILY Wilfrido Goodwin MD  Active   lisinopril 30 mg tablet [id number] Yes TAKE 1 TABLET BY MOUTH ONCE DAILY Jimmy Helm MD  Active   nitroglycerin (Nitrostat) 0.4 mg SL tablet [id number]  Place 1 tablet (0.4 mg) under the tongue every 5 minutes if needed for chest pain. Wilfrido Goodwin MD   25 [id number]   pantoprazole (ProtoNix) 40 mg EC tablet [id number] Yes TAKE 1 TABLET BY MOUTH ONCE DAILY Dixon Murray MD  Active   simvastatin (Zocor) 20 mg tablet [id number] Yes TAKE 1 TABLET BY MOUTH EVERY  DAY AT BEDTIME Wilfrido Goodwin MD  Active   tamsulosin (Flomax) 0.4 mg 24 hr capsule [id number]  Take 1 capsule (0.4 mg) by mouth once daily.   Patient not taking: Reported on 3/10/2025    Historical Provider, MD  Active                   Assessment/Plan    1) MDS/RARS  -has symptomatic anemia  -failed aranesp  -currently on luspatercept Q3 weeks  -currently being dosed at 1.75 mg/kg  -energy levels have picked up  -labs to be done today include CBC, COMP  -results reviewed--wbc 7.9, hgb 11.1plt 165,000, creatinine 2.01, calcium 9.4, alk phos 78, AST 11, total bili 1.1, ALT <3  -benefits, risks, potential morbidity related to luspatercept were reviewed with Chas and he provided informed consent to prceed  -he went on to receive luspatercept 1.75 mg/kg subcutaneous  -he knows that he will not receive a dose if and when his hgb is >=11.5  -he will continue to return Q21 days     2) hereditary hemochromatosis  -had been on therapeutic phlebotomy in the past, however, given current marked anemia, it has not been wise to continue with phlebotomy     3) hyperlipidemia  -on simvastatin     4) hypertension  -on hydrochlorothiazide  -on lisinopril  -on Cartia XT     5) diabetes  -on glimepiride  -on metformin  -on levemir     6) PAD  -on ASA  -s/p multiple stents     Problem List Items Addressed This Visit             ICD-10-CM    MDS (myelodysplastic syndrome), low grade (Multi) D46.Z    Relevant Orders    Infusion Appointment Request [facility] INFUSION    Clinic Appointment Request Chemo Follow Up; MIKI BOWSER; [facility] MEDONC1    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

## 2025-03-11 ASSESSMENT — ENCOUNTER SYMPTOMS
CARDIOVASCULAR NEGATIVE: 1
GASTROINTESTINAL NEGATIVE: 1
CONSTITUTIONAL NEGATIVE: 1
MUSCULOSKELETAL NEGATIVE: 1
HEMATOLOGIC/LYMPHATIC NEGATIVE: 1
ENDOCRINE NEGATIVE: 1
NEUROLOGICAL NEGATIVE: 1
PSYCHIATRIC NEGATIVE: 1
RESPIRATORY NEGATIVE: 1
EYES NEGATIVE: 1

## 2025-03-14 ENCOUNTER — APPOINTMENT (OUTPATIENT)
Dept: NEPHROLOGY | Facility: CLINIC | Age: 80
End: 2025-03-14
Payer: MEDICARE

## 2025-03-17 ENCOUNTER — PATIENT OUTREACH (OUTPATIENT)
Dept: PRIMARY CARE | Facility: CLINIC | Age: 80
End: 2025-03-17
Payer: MEDICARE

## 2025-03-17 DIAGNOSIS — D46.Z MDS (MYELODYSPLASTIC SYNDROME), LOW GRADE (MULTI): ICD-10-CM

## 2025-03-17 DIAGNOSIS — G20.A1 PARKINSON'S DISEASE WITHOUT FLUCTUATING MANIFESTATIONS, UNSPECIFIED WHETHER DYSKINESIA PRESENT: ICD-10-CM

## 2025-03-17 NOTE — PROGRESS NOTES
Chart reviewed and RN CM monthly outreach call placed.  Spoke with patient.  He was seen by podiatry on 2/20/25 and was given laser therapy to attempt to correct nerve pain.  Patient states that he feels that he experienced a slight decrease in pain. He is scheduled for a follow up on 3/20/25.  He is unable to stand for extended periods of time without pain.  He is scheduled with Neurology tomorrow.   He has been receiving  luspatercept injections every 3 weeks.  Injections are held if HGB is greater than 11.5.  On 3/10/25 HGB was 11.1 patient states that next inj scheduled 3/31/25 may be held.   Next appointment with Floridalma Piper is scheduled for 5/13/25.

## 2025-03-28 ENCOUNTER — APPOINTMENT (OUTPATIENT)
Dept: NEPHROLOGY | Facility: CLINIC | Age: 80
End: 2025-03-28
Payer: MEDICARE

## 2025-03-28 VITALS
HEART RATE: 82 BPM | BODY MASS INDEX: 28.28 KG/M2 | DIASTOLIC BLOOD PRESSURE: 62 MMHG | WEIGHT: 176 LBS | SYSTOLIC BLOOD PRESSURE: 93 MMHG | HEIGHT: 66 IN

## 2025-03-28 DIAGNOSIS — E08.22 DIABETES MELLITUS DUE TO UNDERLYING CONDITION WITH DIABETIC CHRONIC KIDNEY DISEASE, UNSPECIFIED CKD STAGE, UNSPECIFIED WHETHER LONG TERM INSULIN USE: Primary | ICD-10-CM

## 2025-03-28 DIAGNOSIS — E21.3 HYPERPARATHYROIDISM (MULTI): ICD-10-CM

## 2025-03-28 DIAGNOSIS — R80.8 OTHER PROTEINURIA: ICD-10-CM

## 2025-03-28 DIAGNOSIS — N18.32 STAGE 3B CHRONIC KIDNEY DISEASE (MULTI): ICD-10-CM

## 2025-03-28 DIAGNOSIS — I10 ESSENTIAL HYPERTENSION: ICD-10-CM

## 2025-03-28 PROCEDURE — 1036F TOBACCO NON-USER: CPT | Performed by: INTERNAL MEDICINE

## 2025-03-28 PROCEDURE — 1159F MED LIST DOCD IN RCRD: CPT | Performed by: INTERNAL MEDICINE

## 2025-03-28 PROCEDURE — 3078F DIAST BP <80 MM HG: CPT | Performed by: INTERNAL MEDICINE

## 2025-03-28 PROCEDURE — 1123F ACP DISCUSS/DSCN MKR DOCD: CPT | Performed by: INTERNAL MEDICINE

## 2025-03-28 PROCEDURE — 3074F SYST BP LT 130 MM HG: CPT | Performed by: INTERNAL MEDICINE

## 2025-03-28 PROCEDURE — 99214 OFFICE O/P EST MOD 30 MIN: CPT | Performed by: INTERNAL MEDICINE

## 2025-03-28 RX ORDER — LISINOPRIL 20 MG/1
20 TABLET ORAL DAILY
Qty: 90 TABLET | Refills: 3 | Status: SHIPPED | OUTPATIENT
Start: 2025-03-28 | End: 2026-03-28

## 2025-03-28 NOTE — PROGRESS NOTES
"Chas Melgar   79 y.o.    @@  [facility]/Room: [id number]/Room/bed info not found    Subjective:   The patient is being seen for a routine clinic follow-up of chronic kidney disease. Recently, the disease has been stable. Disease complications:  No hyperkalemia, no hypocalcemia, no hyperphosphatemia, no metabolic acidosis, no coagulopathy, no uremic encephalopathy, no neuropathy and no renal osteodystrophy. The patient is currently asymptomatic. No associated symptoms are reported.       Meds:   Current Outpatient Medications   Medication Sig Dispense Refill    alfuzosin (Uroxatral) 10 mg 24 hr tablet Take 1 tablet (10 mg) by mouth early in the morning..      aspirin 81 mg EC tablet Take 1 tablet (81 mg) by mouth once daily.      BD Ultra-Fine Short Pen Needle 31 gauge x 5/16\" needle USE ONCE DAILY 100 each 3    calcitriol (Rocaltrol) 0.25 mcg capsule TAKE 1 CAPSULE BY MOUTH EVERY other day 45 capsule 3    cyanocobalamin (Vitamin B-12) 1,000 mcg tablet Take 2 tablets (2,000 mcg) by mouth once daily.      dapagliflozin propanediol (Farxiga) 10 mg Take 1 tablet (10 mg) by mouth once daily in the morning. Take before meals. 90 tablet 3    dilTIAZem CD (Cardizem CD) 240 mg 24 hr capsule TAKE 1 CAPSULE BY MOUTH DAILY 270 capsule 0    finasteride (Proscar) 5 mg tablet Take 1 tablet (5 mg) by mouth early in the morning..      folic acid 0.8 mg capsule Take 1 tablet by mouth once daily.      gabapentin (Neurontin) 100 mg capsule Take 2 capsules (200 mg) by mouth 2 times a day. 120 capsule 1    glimepiride (Amaryl) 4 mg tablet Take 1 tablet (4 mg) by mouth 2 times a day. 180 tablet 1    hydroCHLOROthiazide (HYDRODiuril) 25 mg tablet TAKE 1 TABLET BY MOUTH ONCE DAILY 90 tablet 1    insulin glargine (Basaglar KwikPen U-100 Insulin) 100 unit/mL (3 mL) pen Inject 20 Units under the skin once daily at bedtime. 15 mL 4    isosorbide mononitrate ER (Imdur) 30 mg 24 hr tablet TAKE 1 TABLET BY MOUTH DAILY 270 tablet 0    lisinopril " 30 mg tablet TAKE 1 TABLET BY MOUTH ONCE DAILY 90 tablet 1    pantoprazole (ProtoNix) 40 mg EC tablet TAKE 1 TABLET BY MOUTH ONCE DAILY 90 tablet 3    simvastatin (Zocor) 20 mg tablet TAKE 1 TABLET BY MOUTH EVERY DAY AT BEDTIME 180 tablet 0    tamsulosin (Flomax) 0.4 mg 24 hr capsule Take 1 capsule (0.4 mg) by mouth once daily.      carbidopa-levodopa (Sinemet)  mg tablet Take by mouth.      nitroglycerin (Nitrostat) 0.4 mg SL tablet Place 1 tablet (0.4 mg) under the tongue every 5 minutes if needed for chest pain. 25 tablet 5     No current facility-administered medications for this visit.          ROS:  The patient is awake and oriented. No dizziness or lightheadedness. No chills and no fever. No headaches. No nausea and no vomiting. No shortness of breath. No cough. No chest pain. No abdominal pain. No diarrhea. No hematemesis or hemoptysis. No hematuria. No rectal bleeding. No melena. No epistaxis. No urinary symptoms. No flank pain. No leg edema. No itching. Overall, the rest of the review of systems is also negative.  12 point review of systems otherwise negative as stated in HPI.        Physical Examination:        Vitals:    03/28/25 1016   BP: 93/62   Pulse: 82     General: The patient is awake, oriented, and is not in any distress.  Head and Neck: Normocephalic. No periorbital edema.  Eyes: non-icteric  Respiratory: Symmetric chest expansion. No respiratory distress.  Skin: No maculopapular rash.  Musculoskeletal: No peripheral edema.  Neuro Exam: Speech is fluent. Moves extremities.    Imaging:  === 02/14/25 ===    US RENAL COMPLETE    - Impression -  Simple and minimally complex renal cysts. No hydronephrosis.    Apparent bladder wall thickening may be due to muscular hypertrophy.  Heterogeneous enlarged prostate.    MACRO:  None    Signed by: Jakub Waller 2/15/2025 1:05 PM  Dictation workstation:   [id number]       Blood Labs:  No results found for this or any previous visit (from the past 24  hours).   Lab Results   Component Value Date    PTH 40.2 08/21/2024    PROTUR 66 (H) 06/30/2023    PHOS 4.0 06/30/2023      Lab Results   Component Value Date    GLUCOSE 155 (H) 03/10/2025    CALCIUM 9.4 03/10/2025     03/10/2025    K 4.6 03/10/2025    CO2 26 03/10/2025     03/10/2025    BUN 37 (H) 03/10/2025    CREATININE 2.01 (H) 03/10/2025         Assessment and Plan:  1. Chronic kidney disease stage III. Last creatinine level is 2.01.  Stable kidney function.  Normal potassium and bicarb level.      2. Hypertension. Blood pressure is on low side.  I decreased his lisinopril dose.    3. Proteinuria. He has about 330 mg proteinuria. It is because of diabetic nephropathy. He is on lisinopril and Farxigas.       4.  Secondary hyperparathyroidism.  On calcitriol.  PTH level will be checked before the next appointment.           I will see him in about 4 months for follow-up.           Siddhartha Burnett MD  Senior Attending Physician  Director of Onco-Nephrology Program  Division of Nephrology & Hypertension  [facility]

## 2025-03-31 ENCOUNTER — LAB (OUTPATIENT)
Dept: LAB | Facility: CLINIC | Age: 80
End: 2025-03-31
Payer: MEDICARE

## 2025-03-31 ENCOUNTER — INFUSION (OUTPATIENT)
Dept: HEMATOLOGY/ONCOLOGY | Facility: CLINIC | Age: 80
End: 2025-03-31
Payer: MEDICARE

## 2025-03-31 VITALS
HEART RATE: 77 BPM | BODY MASS INDEX: 28.34 KG/M2 | OXYGEN SATURATION: 96 % | RESPIRATION RATE: 16 BRPM | SYSTOLIC BLOOD PRESSURE: 116 MMHG | WEIGHT: 175.6 LBS | TEMPERATURE: 97.5 F | DIASTOLIC BLOOD PRESSURE: 63 MMHG

## 2025-03-31 DIAGNOSIS — Z95.1 S/P CABG X 5: ICD-10-CM

## 2025-03-31 DIAGNOSIS — D46.1 REFRACTORY ANEMIA WITH RING SIDEROBLASTS: ICD-10-CM

## 2025-03-31 DIAGNOSIS — I25.10 CORONARY ARTERY DISEASE INVOLVING NATIVE CORONARY ARTERY OF NATIVE HEART, UNSPECIFIED WHETHER ANGINA PRESENT: ICD-10-CM

## 2025-03-31 DIAGNOSIS — D46.Z MDS (MYELODYSPLASTIC SYNDROME), LOW GRADE (MULTI): ICD-10-CM

## 2025-03-31 DIAGNOSIS — E78.2 MIXED HYPERLIPIDEMIA: ICD-10-CM

## 2025-03-31 LAB
ALBUMIN SERPL BCP-MCNC: 4.3 G/DL (ref 3.4–5)
ALP SERPL-CCNC: 71 U/L (ref 33–136)
ALT SERPL W P-5'-P-CCNC: 4 U/L (ref 10–52)
ANION GAP SERPL CALC-SCNC: 15 MMOL/L (ref 10–20)
AST SERPL W P-5'-P-CCNC: 12 U/L (ref 9–39)
BASO STIPL BLD QL SMEAR: PRESENT
BASOPHILS # BLD AUTO: 0.09 X10*3/UL (ref 0–0.1)
BASOPHILS NFR BLD AUTO: 1.3 %
BILIRUB SERPL-MCNC: 1 MG/DL (ref 0–1.2)
BUN SERPL-MCNC: 45 MG/DL (ref 6–23)
CALCIUM SERPL-MCNC: 9.1 MG/DL (ref 8.6–10.3)
CHLORIDE SERPL-SCNC: 103 MMOL/L (ref 98–107)
CO2 SERPL-SCNC: 25 MMOL/L (ref 21–32)
CREAT SERPL-MCNC: 2.06 MG/DL (ref 0.5–1.3)
EGFRCR SERPLBLD CKD-EPI 2021: 32 ML/MIN/1.73M*2
EOSINOPHIL # BLD AUTO: 0.49 X10*3/UL (ref 0–0.4)
EOSINOPHIL NFR BLD AUTO: 6.8 %
ERYTHROCYTE [DISTWIDTH] IN BLOOD BY AUTOMATED COUNT: 21.7 % (ref 11.5–14.5)
GLUCOSE SERPL-MCNC: 172 MG/DL (ref 74–99)
HCT VFR BLD AUTO: 31.6 % (ref 41–52)
HGB BLD-MCNC: 10.7 G/DL (ref 13.5–17.5)
IMM GRANULOCYTES # BLD AUTO: 0.04 X10*3/UL (ref 0–0.5)
IMM GRANULOCYTES NFR BLD AUTO: 0.6 % (ref 0–0.9)
LYMPHOCYTES # BLD AUTO: 1.3 X10*3/UL (ref 0.8–3)
LYMPHOCYTES NFR BLD AUTO: 18.1 %
MCH RBC QN AUTO: 38.2 PG (ref 26–34)
MCHC RBC AUTO-ENTMCNC: 33.9 G/DL (ref 32–36)
MCV RBC AUTO: 113 FL (ref 80–100)
MONOCYTES # BLD AUTO: 0.79 X10*3/UL (ref 0.05–0.8)
MONOCYTES NFR BLD AUTO: 11 %
NEUTROPHILS # BLD AUTO: 4.47 X10*3/UL (ref 1.6–5.5)
NEUTROPHILS NFR BLD AUTO: 62.2 %
NRBC BLD-RTO: ABNORMAL /100{WBCS}
OVALOCYTES BLD QL SMEAR: NORMAL
PLATELET # BLD AUTO: 155 X10*3/UL (ref 150–450)
POLYCHROMASIA BLD QL SMEAR: NORMAL
POTASSIUM SERPL-SCNC: 4.7 MMOL/L (ref 3.5–5.3)
PROT SERPL-MCNC: 6.8 G/DL (ref 6.4–8.2)
RBC # BLD AUTO: 2.8 X10*6/UL (ref 4.5–5.9)
RBC MORPH BLD: NORMAL
SODIUM SERPL-SCNC: 138 MMOL/L (ref 136–145)
WBC # BLD AUTO: 7.2 X10*3/UL (ref 4.4–11.3)

## 2025-03-31 PROCEDURE — 96372 THER/PROPH/DIAG INJ SC/IM: CPT

## 2025-03-31 PROCEDURE — 2500000004 HC RX 250 GENERAL PHARMACY W/ HCPCS (ALT 636 FOR OP/ED): Mod: JW,TB | Performed by: INTERNAL MEDICINE

## 2025-03-31 PROCEDURE — 36415 COLL VENOUS BLD VENIPUNCTURE: CPT

## 2025-03-31 PROCEDURE — 85025 COMPLETE CBC W/AUTO DIFF WBC: CPT

## 2025-03-31 PROCEDURE — 80053 COMPREHEN METABOLIC PANEL: CPT

## 2025-03-31 RX ORDER — EPINEPHRINE 0.3 MG/.3ML
0.3 INJECTION SUBCUTANEOUS EVERY 5 MIN PRN
Status: DISCONTINUED | OUTPATIENT
Start: 2025-03-31 | End: 2025-03-31 | Stop reason: HOSPADM

## 2025-03-31 RX ORDER — ALBUTEROL SULFATE 0.83 MG/ML
3 SOLUTION RESPIRATORY (INHALATION) AS NEEDED
Status: DISCONTINUED | OUTPATIENT
Start: 2025-03-31 | End: 2025-03-31 | Stop reason: HOSPADM

## 2025-03-31 RX ORDER — EPINEPHRINE 0.3 MG/.3ML
0.3 INJECTION SUBCUTANEOUS EVERY 5 MIN PRN
OUTPATIENT
Start: 2025-04-21

## 2025-03-31 RX ORDER — FAMOTIDINE 10 MG/ML
20 INJECTION, SOLUTION INTRAVENOUS ONCE AS NEEDED
Status: DISCONTINUED | OUTPATIENT
Start: 2025-03-31 | End: 2025-03-31 | Stop reason: HOSPADM

## 2025-03-31 RX ORDER — SIMVASTATIN 20 MG/1
20 TABLET, FILM COATED ORAL NIGHTLY
Qty: 90 TABLET | Refills: 3 | Status: SHIPPED | OUTPATIENT
Start: 2025-03-31 | End: 2026-03-31

## 2025-03-31 RX ORDER — ALBUTEROL SULFATE 0.83 MG/ML
3 SOLUTION RESPIRATORY (INHALATION) AS NEEDED
OUTPATIENT
Start: 2025-04-21

## 2025-03-31 RX ORDER — DIPHENHYDRAMINE HYDROCHLORIDE 50 MG/ML
50 INJECTION, SOLUTION INTRAMUSCULAR; INTRAVENOUS AS NEEDED
Status: DISCONTINUED | OUTPATIENT
Start: 2025-03-31 | End: 2025-03-31 | Stop reason: HOSPADM

## 2025-03-31 RX ORDER — DIPHENHYDRAMINE HYDROCHLORIDE 50 MG/ML
50 INJECTION, SOLUTION INTRAMUSCULAR; INTRAVENOUS AS NEEDED
OUTPATIENT
Start: 2025-04-21

## 2025-03-31 RX ORDER — FAMOTIDINE 10 MG/ML
20 INJECTION, SOLUTION INTRAVENOUS ONCE AS NEEDED
OUTPATIENT
Start: 2025-04-21

## 2025-03-31 RX ADMIN — LUSPATERCEPT 140 MG: 75 INJECTION, POWDER, LYOPHILIZED, FOR SOLUTION SUBCUTANEOUS at 11:22

## 2025-03-31 ASSESSMENT — PAIN SCALES - GENERAL: PAINLEVEL_OUTOF10: 0-NO PAIN

## 2025-03-31 NOTE — PATIENT INSTRUCTIONS
Pt here for luspatercept. Hgb-10.7. Pt received injections today without incident. RTC in 3 weeks.

## 2025-03-31 NOTE — TELEPHONE ENCOUNTER
Received request for prescription refills for patient.   Patient follows with Dr. Goodwin    Request is for Zocor  Is patient currently on medication yes    Last OV 10/24/2024  Next OV 7/24/2025    Pended for signing and sent to provider

## 2025-04-08 ENCOUNTER — OFFICE VISIT (OUTPATIENT)
Age: 80
End: 2025-04-08
Payer: COMMERCIAL

## 2025-04-08 VITALS
HEART RATE: 80 BPM | HEIGHT: 67 IN | OXYGEN SATURATION: 93 % | WEIGHT: 174.16 LBS | BODY MASS INDEX: 27.34 KG/M2 | DIASTOLIC BLOOD PRESSURE: 61 MMHG | SYSTOLIC BLOOD PRESSURE: 98 MMHG

## 2025-04-08 DIAGNOSIS — E11.69 TYPE 2 DIABETES MELLITUS WITH OTHER SPECIFIED COMPLICATION, UNSPECIFIED WHETHER LONG TERM INSULIN USE (HCC): Primary | ICD-10-CM

## 2025-04-08 LAB
CHP ED QC CHECK: NORMAL
GLUCOSE BLD-MCNC: 214 MG/DL
HBA1C MFR BLD: 8 %

## 2025-04-08 PROCEDURE — 99203 OFFICE O/P NEW LOW 30 MIN: CPT | Performed by: INTERNAL MEDICINE

## 2025-04-08 PROCEDURE — 82962 GLUCOSE BLOOD TEST: CPT | Performed by: INTERNAL MEDICINE

## 2025-04-08 PROCEDURE — G8419 CALC BMI OUT NRM PARAM NOF/U: HCPCS | Performed by: INTERNAL MEDICINE

## 2025-04-08 PROCEDURE — 1036F TOBACCO NON-USER: CPT | Performed by: INTERNAL MEDICINE

## 2025-04-08 PROCEDURE — 3052F HG A1C>EQUAL 8.0%<EQUAL 9.0%: CPT | Performed by: INTERNAL MEDICINE

## 2025-04-08 PROCEDURE — G8427 DOCREV CUR MEDS BY ELIG CLIN: HCPCS | Performed by: INTERNAL MEDICINE

## 2025-04-08 PROCEDURE — 83036 HEMOGLOBIN GLYCOSYLATED A1C: CPT | Performed by: INTERNAL MEDICINE

## 2025-04-08 PROCEDURE — 1123F ACP DISCUSS/DSCN MKR DOCD: CPT | Performed by: INTERNAL MEDICINE

## 2025-04-08 RX ORDER — OMEGA-3S/DHA/EPA/FISH OIL/D3 300MG-1000
400 CAPSULE ORAL 2 TIMES DAILY
COMMUNITY

## 2025-04-08 RX ORDER — INSULIN GLARGINE 100 [IU]/ML
20 INJECTION, SOLUTION SUBCUTANEOUS NIGHTLY
COMMUNITY
Start: 2024-05-09 | End: 2025-04-08 | Stop reason: SDUPTHER

## 2025-04-08 RX ORDER — CYANOCOBALAMIN (VITAMIN B-12) 500 MCG
1 TABLET ORAL DAILY
COMMUNITY

## 2025-04-08 RX ORDER — ALFUZOSIN HYDROCHLORIDE 10 MG/1
10 TABLET, EXTENDED RELEASE ORAL
COMMUNITY
Start: 2024-10-11

## 2025-04-08 RX ORDER — TAMSULOSIN HYDROCHLORIDE 0.4 MG/1
0.4 CAPSULE ORAL DAILY
COMMUNITY

## 2025-04-08 RX ORDER — AVOBENZONE, HOMOSALATE, OCTISALATE, OCTOCRYLENE 30; 40; 45; 26 MG/ML; MG/ML; MG/ML; MG/ML
1 CREAM TOPICAL DAILY
Qty: 100 EACH | Refills: 3 | Status: SHIPPED | OUTPATIENT
Start: 2025-04-08

## 2025-04-08 RX ORDER — FINASTERIDE 5 MG/1
5 TABLET, FILM COATED ORAL DAILY
COMMUNITY
Start: 2025-02-11

## 2025-04-08 RX ORDER — PEN NEEDLE, DIABETIC 31 GX5/16"
NEEDLE, DISPOSABLE MISCELLANEOUS
COMMUNITY
Start: 2025-01-26

## 2025-04-08 RX ORDER — ASPIRIN 81 MG/1
81 TABLET ORAL DAILY
COMMUNITY

## 2025-04-08 RX ORDER — HYDROCHLOROTHIAZIDE 25 MG/1
25 TABLET ORAL DAILY
COMMUNITY

## 2025-04-08 RX ORDER — GLUCOSAMINE HCL/CHONDROITIN SU 500-400 MG
CAPSULE ORAL
Qty: 100 STRIP | Refills: 3 | Status: SHIPPED | OUTPATIENT
Start: 2025-04-08

## 2025-04-08 RX ORDER — SIMVASTATIN 20 MG
20 TABLET ORAL NIGHTLY
COMMUNITY
Start: 2024-10-08 | End: 2026-03-31

## 2025-04-08 RX ORDER — NITROGLYCERIN 0.4 MG/1
0.4 TABLET SUBLINGUAL
COMMUNITY

## 2025-04-08 RX ORDER — PANTOPRAZOLE SODIUM 40 MG/1
40 TABLET, DELAYED RELEASE ORAL DAILY
COMMUNITY
Start: 2024-12-31

## 2025-04-08 RX ORDER — LANOLIN ALCOHOL/MO/W.PET/CERES
2000 CREAM (GRAM) TOPICAL DAILY
COMMUNITY

## 2025-04-08 RX ORDER — CALCITRIOL 0.25 UG/1
0.25 CAPSULE, LIQUID FILLED ORAL EVERY OTHER DAY
COMMUNITY
Start: 2025-01-04

## 2025-04-08 RX ORDER — LANOLIN ALCOHOL/MO/W.PET/CERES
100 CREAM (GRAM) TOPICAL DAILY
COMMUNITY
Start: 2025-01-29 | End: 2026-01-29

## 2025-04-08 RX ORDER — LISINOPRIL 20 MG/1
20 TABLET ORAL DAILY
COMMUNITY
Start: 2025-03-28 | End: 2026-03-28

## 2025-04-08 RX ORDER — ISOSORBIDE MONONITRATE 30 MG/1
30 TABLET, EXTENDED RELEASE ORAL DAILY
COMMUNITY
Start: 2024-10-08

## 2025-04-08 RX ORDER — GABAPENTIN 100 MG/1
200 CAPSULE ORAL 2 TIMES DAILY
COMMUNITY
Start: 2025-01-31 | End: 2025-07-30

## 2025-04-08 RX ORDER — GLIMEPIRIDE 4 MG/1
4 TABLET ORAL 2 TIMES DAILY
COMMUNITY
Start: 2024-10-14

## 2025-04-08 RX ORDER — CARBIDOPA AND LEVODOPA 25; 100 MG/1; MG/1
1 TABLET ORAL 3 TIMES DAILY
COMMUNITY
Start: 2025-01-29 | End: 2026-01-29

## 2025-04-08 RX ORDER — DILTIAZEM HYDROCHLORIDE 240 MG/1
240 CAPSULE, COATED, EXTENDED RELEASE ORAL DAILY
COMMUNITY
Start: 2024-10-08

## 2025-04-08 RX ORDER — DAPAGLIFLOZIN 10 MG/1
10 TABLET, FILM COATED ORAL
COMMUNITY
Start: 2024-10-11 | End: 2026-01-17

## 2025-04-08 RX ORDER — INSULIN GLARGINE 100 [IU]/ML
INJECTION, SOLUTION SUBCUTANEOUS
Qty: 5 ADJUSTABLE DOSE PRE-FILLED PEN SYRINGE | Refills: 3
Start: 2025-04-08

## 2025-04-08 ASSESSMENT — ENCOUNTER SYMPTOMS: EYES NEGATIVE: 1

## 2025-04-08 NOTE — PROGRESS NOTES
Connections: Not on file   Intimate Partner Violence: Not on file   Housing Stability: Not on file     No family history on file.  Allergies   Allergen Reactions    Aluminum Rash     Aluminum    Nickel Rash and Other (See Comments)     rash      NICKEL  CONTACT DERMATITIS    rash    NICKEL  CONTACT DERMATITIS       Current Outpatient Medications:     alfuzosin (UROXATRAL) 10 MG extended release tablet, Take 1 tablet by mouth, Disp: , Rfl:     aspirin 81 MG EC tablet, Take 1 tablet by mouth daily, Disp: , Rfl:     calcitRIOL (ROCALTROL) 0.25 MCG capsule, Take 1 capsule by mouth every other day, Disp: , Rfl:     carbidopa-levodopa (SINEMET)  MG per tablet, Take 1 tablet by mouth 3 times daily, Disp: , Rfl:     cholecalciferol (VITAMIN D3) 400 UNIT TABS tablet, Take 400 Int'l Units by mouth 2 times daily, Disp: , Rfl:     vitamin B-12 (CYANOCOBALAMIN) 1000 MCG tablet, Take 2 tablets by mouth daily, Disp: , Rfl:     dapagliflozin (FARXIGA) 10 MG tablet, Take 1 tablet by mouth every morning (before breakfast), Disp: , Rfl:     dilTIAZem (CARDIZEM CD) 240 MG extended release capsule, Take 1 capsule by mouth daily, Disp: , Rfl:     finasteride (PROSCAR) 5 MG tablet, Take 1 tablet by mouth daily, Disp: , Rfl:     Folic Acid 0.8 MG CAPS, Take 1 tablet by mouth daily, Disp: , Rfl:     gabapentin (NEURONTIN) 100 MG capsule, Take 2 capsules by mouth 2 times daily., Disp: , Rfl:     glimepiride (AMARYL) 4 MG tablet, Take 1 tablet by mouth 2 times daily, Disp: , Rfl:     hydroCHLOROthiazide (HYDRODIURIL) 25 MG tablet, Take 1 tablet by mouth daily, Disp: , Rfl:     BASAGLAR KWIKPEN 100 UNIT/ML injection pen, Inject 20 Units into the skin nightly, Disp: , Rfl:     B-D ULTRAFINE III SHORT PEN 31G X 8 MM MISC, use ONCE DAILY, Disp: , Rfl:     isosorbide mononitrate (IMDUR) 30 MG extended release tablet, Take 1 tablet by mouth daily, Disp: , Rfl:     lisinopril (PRINIVIL;ZESTRIL) 20 MG tablet, Take 1 tablet by mouth daily, Disp:

## 2025-04-15 ENCOUNTER — PATIENT OUTREACH (OUTPATIENT)
Dept: PRIMARY CARE | Facility: CLINIC | Age: 80
End: 2025-04-15
Payer: MEDICARE

## 2025-04-15 DIAGNOSIS — D46.Z MDS (MYELODYSPLASTIC SYNDROME), LOW GRADE (MULTI): ICD-10-CM

## 2025-04-15 DIAGNOSIS — G20.A1 PARKINSON'S DISEASE WITHOUT FLUCTUATING MANIFESTATIONS, UNSPECIFIED WHETHER DYSKINESIA PRESENT: ICD-10-CM

## 2025-04-21 ENCOUNTER — INFUSION (OUTPATIENT)
Dept: HEMATOLOGY/ONCOLOGY | Facility: CLINIC | Age: 80
End: 2025-04-21
Payer: MEDICARE

## 2025-04-21 ENCOUNTER — OFFICE VISIT (OUTPATIENT)
Dept: HEMATOLOGY/ONCOLOGY | Facility: CLINIC | Age: 80
End: 2025-04-21
Payer: MEDICARE

## 2025-04-21 ENCOUNTER — LAB (OUTPATIENT)
Dept: LAB | Facility: CLINIC | Age: 80
End: 2025-04-21
Payer: MEDICARE

## 2025-04-21 VITALS
SYSTOLIC BLOOD PRESSURE: 112 MMHG | TEMPERATURE: 96.8 F | OXYGEN SATURATION: 93 % | DIASTOLIC BLOOD PRESSURE: 65 MMHG | WEIGHT: 174.16 LBS | RESPIRATION RATE: 18 BRPM | HEART RATE: 82 BPM | BODY MASS INDEX: 28.11 KG/M2

## 2025-04-21 DIAGNOSIS — D46.Z MDS (MYELODYSPLASTIC SYNDROME), LOW GRADE (MULTI): ICD-10-CM

## 2025-04-21 DIAGNOSIS — D46.1 REFRACTORY ANEMIA WITH RING SIDEROBLASTS: ICD-10-CM

## 2025-04-21 DIAGNOSIS — E08.65 DIABETES MELLITUS DUE TO UNDERLYING CONDITION WITH HYPERGLYCEMIA, WITH LONG-TERM CURRENT USE OF INSULIN: ICD-10-CM

## 2025-04-21 DIAGNOSIS — D46.Z MDS (MYELODYSPLASTIC SYNDROME), LOW GRADE (MULTI): Primary | ICD-10-CM

## 2025-04-21 DIAGNOSIS — Z79.4 DIABETES MELLITUS DUE TO UNDERLYING CONDITION WITH HYPERGLYCEMIA, WITH LONG-TERM CURRENT USE OF INSULIN: ICD-10-CM

## 2025-04-21 DIAGNOSIS — E83.110 HEREDITARY HEMOCHROMATOSIS (CMS-HCC): ICD-10-CM

## 2025-04-21 DIAGNOSIS — I10 ESSENTIAL HYPERTENSION: ICD-10-CM

## 2025-04-21 DIAGNOSIS — E78.2 MIXED HYPERLIPIDEMIA: ICD-10-CM

## 2025-04-21 DIAGNOSIS — I73.9 PVD (PERIPHERAL VASCULAR DISEASE) (CMS-HCC): ICD-10-CM

## 2025-04-21 LAB
ALBUMIN SERPL BCP-MCNC: 4.6 G/DL (ref 3.4–5)
ALP SERPL-CCNC: 72 U/L (ref 33–136)
ALT SERPL W P-5'-P-CCNC: 4 U/L (ref 10–52)
ANION GAP SERPL CALC-SCNC: 16 MMOL/L (ref 10–20)
AST SERPL W P-5'-P-CCNC: 14 U/L (ref 9–39)
BASO STIPL BLD QL SMEAR: PRESENT
BASOPHILS # BLD AUTO: 0.15 X10*3/UL (ref 0–0.1)
BASOPHILS NFR BLD AUTO: 1.9 %
BILIRUB SERPL-MCNC: 1.3 MG/DL (ref 0–1.2)
BUN SERPL-MCNC: 33 MG/DL (ref 6–23)
CALCIUM SERPL-MCNC: 9.2 MG/DL (ref 8.6–10.3)
CHLORIDE SERPL-SCNC: 103 MMOL/L (ref 98–107)
CO2 SERPL-SCNC: 22 MMOL/L (ref 21–32)
CREAT SERPL-MCNC: 1.97 MG/DL (ref 0.5–1.3)
EGFRCR SERPLBLD CKD-EPI 2021: 34 ML/MIN/1.73M*2
EOSINOPHIL # BLD AUTO: 0.46 X10*3/UL (ref 0–0.4)
EOSINOPHIL NFR BLD AUTO: 5.7 %
ERYTHROCYTE [DISTWIDTH] IN BLOOD BY AUTOMATED COUNT: 22.6 % (ref 11.5–14.5)
GIANT PLATELETS BLD QL SMEAR: NORMAL
GLUCOSE SERPL-MCNC: 182 MG/DL (ref 74–99)
HCT VFR BLD AUTO: 34.4 % (ref 41–52)
HGB BLD-MCNC: 11.2 G/DL (ref 13.5–17.5)
IMM GRANULOCYTES # BLD AUTO: 0.05 X10*3/UL (ref 0–0.5)
IMM GRANULOCYTES NFR BLD AUTO: 0.6 % (ref 0–0.9)
LYMPHOCYTES # BLD AUTO: 2.08 X10*3/UL (ref 0.8–3)
LYMPHOCYTES NFR BLD AUTO: 26 %
MCH RBC QN AUTO: 37.6 PG (ref 26–34)
MCHC RBC AUTO-ENTMCNC: 32.6 G/DL (ref 32–36)
MCV RBC AUTO: 115 FL (ref 80–100)
MONOCYTES # BLD AUTO: 0.86 X10*3/UL (ref 0.05–0.8)
MONOCYTES NFR BLD AUTO: 10.7 %
NEUTROPHILS # BLD AUTO: 4.41 X10*3/UL (ref 1.6–5.5)
NEUTROPHILS NFR BLD AUTO: 55.1 %
NRBC BLD-RTO: ABNORMAL /100{WBCS}
OVALOCYTES BLD QL SMEAR: NORMAL
PLATELET # BLD AUTO: 177 X10*3/UL (ref 150–450)
POLYCHROMASIA BLD QL SMEAR: NORMAL
POTASSIUM SERPL-SCNC: 4.1 MMOL/L (ref 3.5–5.3)
PROT SERPL-MCNC: 7.3 G/DL (ref 6.4–8.2)
RBC # BLD AUTO: 2.98 X10*6/UL (ref 4.5–5.9)
RBC MORPH BLD: NORMAL
SODIUM SERPL-SCNC: 137 MMOL/L (ref 136–145)
WBC # BLD AUTO: 8 X10*3/UL (ref 4.4–11.3)

## 2025-04-21 PROCEDURE — 1123F ACP DISCUSS/DSCN MKR DOCD: CPT | Performed by: INTERNAL MEDICINE

## 2025-04-21 PROCEDURE — 1159F MED LIST DOCD IN RCRD: CPT | Performed by: INTERNAL MEDICINE

## 2025-04-21 PROCEDURE — 1160F RVW MEDS BY RX/DR IN RCRD: CPT | Performed by: INTERNAL MEDICINE

## 2025-04-21 PROCEDURE — 96372 THER/PROPH/DIAG INJ SC/IM: CPT

## 2025-04-21 PROCEDURE — 2500000004 HC RX 250 GENERAL PHARMACY W/ HCPCS (ALT 636 FOR OP/ED): Mod: JW,TB | Performed by: INTERNAL MEDICINE

## 2025-04-21 PROCEDURE — 99214 OFFICE O/P EST MOD 30 MIN: CPT | Performed by: INTERNAL MEDICINE

## 2025-04-21 PROCEDURE — 3078F DIAST BP <80 MM HG: CPT | Performed by: INTERNAL MEDICINE

## 2025-04-21 PROCEDURE — 85025 COMPLETE CBC W/AUTO DIFF WBC: CPT

## 2025-04-21 PROCEDURE — 3074F SYST BP LT 130 MM HG: CPT | Performed by: INTERNAL MEDICINE

## 2025-04-21 PROCEDURE — 1126F AMNT PAIN NOTED NONE PRSNT: CPT | Performed by: INTERNAL MEDICINE

## 2025-04-21 PROCEDURE — 36415 COLL VENOUS BLD VENIPUNCTURE: CPT

## 2025-04-21 PROCEDURE — G2211 COMPLEX E/M VISIT ADD ON: HCPCS | Performed by: INTERNAL MEDICINE

## 2025-04-21 PROCEDURE — 80053 COMPREHEN METABOLIC PANEL: CPT

## 2025-04-21 RX ORDER — ALBUTEROL SULFATE 0.83 MG/ML
3 SOLUTION RESPIRATORY (INHALATION) AS NEEDED
OUTPATIENT
Start: 2025-05-12

## 2025-04-21 RX ORDER — EPINEPHRINE 0.3 MG/.3ML
0.3 INJECTION SUBCUTANEOUS EVERY 5 MIN PRN
OUTPATIENT
Start: 2025-05-12

## 2025-04-21 RX ORDER — DIPHENHYDRAMINE HYDROCHLORIDE 50 MG/ML
50 INJECTION, SOLUTION INTRAMUSCULAR; INTRAVENOUS AS NEEDED
OUTPATIENT
Start: 2025-05-12

## 2025-04-21 RX ORDER — FAMOTIDINE 10 MG/ML
20 INJECTION, SOLUTION INTRAVENOUS ONCE AS NEEDED
OUTPATIENT
Start: 2025-05-12

## 2025-04-21 RX ADMIN — LUSPATERCEPT 137.5 MG: 75 INJECTION, POWDER, LYOPHILIZED, FOR SOLUTION SUBCUTANEOUS at 13:27

## 2025-04-21 ASSESSMENT — PAIN SCALES - GENERAL: PAINLEVEL_OUTOF10: 0-NO PAIN

## 2025-04-21 NOTE — PROGRESS NOTES
Patient ID: Chas Melgar is a 80 y.o. male.  Referring Physician: Miki Bowser MD  [address]  [place] [address]  Primary Care Provider: RITA Rivera  Visit Type: Follow Up      Subjective    HPI How are my blood counts?  My legs bother me, especially my left one    Review of Systems   Constitutional: Negative.    HENT:  Negative.     Eyes: Negative.    Respiratory: Negative.     Cardiovascular: Negative.    Gastrointestinal: Negative.    Endocrine: Negative.    Genitourinary: Negative.     Musculoskeletal:  Positive for arthralgias.   Skin: Negative.    Neurological:  Positive for extremity weakness.   Hematological: Negative.    Psychiatric/Behavioral: Negative.          Objective   BSA: 1.92 meters squared  /65 (BP Location: Right arm)   Pulse 82   Temp 36 °C (96.8 °F) (Temporal)   Resp 18   Wt 79 kg (174 lb 2.6 oz)   SpO2 93%   BMI 28.11 kg/m²      has a past medical history of Acute gastric ulcer with hemorrhage (09/07/2022), Acute upper respiratory infection, unspecified, Arthritis, Body mass index (BMI) 28.0-28.9, adult (10/19/2021), Cancer (Multi), Coronary artery disease, Diabetes mellitus (Multi), Encounter for other preprocedural examination (10/14/2021), Encounter for screening for malignant neoplasm of prostate, Impingement syndrome of unspecified shoulder (07/22/2021), Other abnormalities of breathing, 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), Other specified follicular disorders, Other specified postprocedural states (06/09/2021), Overweight (02/21/2022), Overweight (01/18/2022), Pain in right hip, Pain in right leg, Pain in unspecified hip (08/04/2021), Pain in unspecified shoulder (08/10/2021), Personal history of malignant neoplasm, unspecified (08/04/2021), Personal history of other diseases of male genital organs, Personal history of other diseases of the circulatory system (08/04/2021), Personal history of  other diseases of the circulatory system (08/04/2021), Personal history of other diseases of the digestive system (10/14/2021), Personal history of other diseases of the digestive system (02/26/2022), Personal history of other diseases of the digestive system, Personal history of other diseases of the musculoskeletal system and connective tissue (08/04/2021), Personal history of other diseases of the musculoskeletal system and connective tissue (08/05/2021), Personal history of other diseases of the respiratory system (11/17/2021), Personal history of other diseases of the respiratory system, Personal history of other diseases of the respiratory system, Personal history of other diseases of the respiratory system, Personal history of other diseases of urinary system (09/01/2022), Personal history of other endocrine, nutritional and metabolic disease (10/06/2021), Personal history of other endocrine, nutritional and metabolic disease (01/18/2022), Personal history of other malignant neoplasm of skin, Personal history of other specified conditions (09/01/2022), Personal history of other specified conditions, Personal history of other specified conditions, Persons encountering health services in other specified circumstances, Primary osteoarthritis, 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   has a past surgical history that includes Other surgical history (11/07/2022); Other surgical history (10/14/2021); Other surgical history (10/14/2021); Other surgical history (10/14/2021); Other surgical history (10/14/2021); Other surgical history (10/14/2021); Other surgical history (11/17/2021); CT angio aorta and bilateral iliofemoral runoff including without contrast if  performed (10/27/2020); and Rotator cuff repair.  Family History[1]  Oncology History    No history exists.       Chas Melgar  reports that he quit smoking about 39 years ago. His smoking use included cigarettes. He started smoking about 59 years ago. He has a 40 pack-year smoking history. He has never used smokeless tobacco.  He  reports current alcohol use of about 2.0 standard drinks of alcohol per week.  He  reports no history of drug use.    Physical Exam  Vitals reviewed.   Constitutional:       Appearance: Normal appearance.      Comments: Arrives in a wheelchair   HENT:      Head: Normocephalic.      Mouth/Throat:      Mouth: Mucous membranes are moist.   Eyes:      Extraocular Movements: Extraocular movements intact.      Pupils: Pupils are equal, round, and reactive to light.   Cardiovascular:      Rate and Rhythm: Normal rate and regular rhythm.      Pulses: Normal pulses.      Heart sounds: Normal heart sounds.   Pulmonary:      Effort: Pulmonary effort is normal.      Breath sounds: Normal breath sounds.   Abdominal:      General: Bowel sounds are normal.      Palpations: Abdomen is soft.   Musculoskeletal:         General: Normal range of motion.      Cervical back: Normal range of motion and neck supple.   Skin:     General: Skin is warm.   Neurological:      General: No focal deficit present.      Mental Status: He is alert and oriented to person, place, and time.   Psychiatric:         Mood and Affect: Mood normal.         Behavior: Behavior normal.         WBC   Date/Time Value Ref Range Status   04/21/2025 11:25 AM 8.0 4.4 - 11.3 x10*3/uL Final   03/31/2025 09:43 AM 7.2 4.4 - 11.3 x10*3/uL Final   03/10/2025 10:13 AM 7.9 4.4 - 11.3 x10*3/uL Final     nRBC   Date Value Ref Range Status   04/21/2025   Final     Comment:     Not Measured   03/31/2025   Final     Comment:     Not Measured   03/10/2025   Final     Comment:     Not Measured     RBC   Date Value Ref Range Status   04/21/2025  2.98 (L) 4.50 - 5.90 x10*6/uL Final   03/31/2025 2.80 (L) 4.50 - 5.90 x10*6/uL Final   03/10/2025 2.91 (L) 4.50 - 5.90 x10*6/uL Final     Hemoglobin   Date Value Ref Range Status   04/21/2025 11.2 (L) 13.5 - 17.5 g/dL Final   03/31/2025 10.7 (L) 13.5 - 17.5 g/dL Final   03/10/2025 11.1 (L) 13.5 - 17.5 g/dL Final     Hematocrit   Date Value Ref Range Status   04/21/2025 34.4 (L) 41.0 - 52.0 % Final   03/31/2025 31.6 (L) 41.0 - 52.0 % Final   03/10/2025 32.9 (L) 41.0 - 52.0 % Final     MCV   Date/Time Value Ref Range Status   04/21/2025 11:25  (H) 80 - 100 fL Final   03/31/2025 09:43  (H) 80 - 100 fL Final   03/10/2025 10:13  (H) 80 - 100 fL Final     MCH   Date/Time Value Ref Range Status   04/21/2025 11:25 AM 37.6 (H) 26.0 - 34.0 pg Final   03/31/2025 09:43 AM 38.2 (H) 26.0 - 34.0 pg Final   03/10/2025 10:13 AM 38.1 (H) 26.0 - 34.0 pg Final     MCHC   Date/Time Value Ref Range Status   04/21/2025 11:25 AM 32.6 32.0 - 36.0 g/dL Final   03/31/2025 09:43 AM 33.9 32.0 - 36.0 g/dL Final   03/10/2025 10:13 AM 33.7 32.0 - 36.0 g/dL Final     RDW   Date/Time Value Ref Range Status   04/21/2025 11:25 AM 22.6 (H) 11.5 - 14.5 % Final   03/31/2025 09:43 AM 21.7 (H) 11.5 - 14.5 % Final   03/10/2025 10:13 AM 21.6 (H) 11.5 - 14.5 % Final     Platelets   Date/Time Value Ref Range Status   04/21/2025 11:25  150 - 450 x10*3/uL Final   03/31/2025 09:43  150 - 450 x10*3/uL Final   03/10/2025 10:13  150 - 450 x10*3/uL Final     MPV   Date/Time Value Ref Range Status   10/23/2023 01:06 PM 14.6 (H) 7.5 - 11.5 fL Final   10/02/2023 12:34 PM 14.5 (H) 7.5 - 11.5 fL Final     Neutrophils %   Date/Time Value Ref Range Status   04/21/2025 11:25 AM 55.1 40.0 - 80.0 % Final   03/31/2025 09:43 AM 62.2 40.0 - 80.0 % Final   03/10/2025 10:13 AM 59.7 40.0 - 80.0 % Final     Immature Granulocytes %, Automated   Date/Time Value Ref Range Status   04/21/2025 11:25 AM 0.6 0.0 - 0.9 % Final     Comment:     Immature  Granulocyte Count (IG) includes promyelocytes, myelocytes and metamyelocytes but does not include bands. Percent differential counts (%) should be interpreted in the context of the absolute cell counts (cells/UL).   03/31/2025 09:43 AM 0.6 0.0 - 0.9 % Final     Comment:     Immature Granulocyte Count (IG) includes promyelocytes, myelocytes and metamyelocytes but does not include bands. Percent differential counts (%) should be interpreted in the context of the absolute cell counts (cells/UL).   03/10/2025 10:13 AM 0.8 0.0 - 0.9 % Final     Comment:     Immature Granulocyte Count (IG) includes promyelocytes, myelocytes and metamyelocytes but does not include bands. Percent differential counts (%) should be interpreted in the context of the absolute cell counts (cells/UL).     Lymphocytes %, Manual   Date/Time Value Ref Range Status   02/17/2025 09:31 AM 16.0 13.0 - 44.0 % Final     Lymphocytes %   Date/Time Value Ref Range Status   04/21/2025 11:25 AM 26.0 13.0 - 44.0 % Final   03/31/2025 09:43 AM 18.1 13.0 - 44.0 % Final   03/10/2025 10:13 AM 20.5 13.0 - 44.0 % Final     Monocytes %, Manual   Date/Time Value Ref Range Status   02/17/2025 09:31 AM 4.0 2.0 - 10.0 % Final     Monocytes %   Date/Time Value Ref Range Status   04/21/2025 11:25 AM 10.7 2.0 - 10.0 % Final   03/31/2025 09:43 AM 11.0 2.0 - 10.0 % Final   03/10/2025 10:13 AM 9.9 2.0 - 10.0 % Final     Eosinophils %, Manual   Date/Time Value Ref Range Status   02/17/2025 09:31 AM 7.0 0.0 - 6.0 % Final     Eosinophils %   Date/Time Value Ref Range Status   04/21/2025 11:25 AM 5.7 0.0 - 6.0 % Final   03/31/2025 09:43 AM 6.8 0.0 - 6.0 % Final   03/10/2025 10:13 AM 7.6 0.0 - 6.0 % Final     Basophils %, Manual   Date/Time Value Ref Range Status   02/17/2025 09:31 AM 1.0 0.0 - 2.0 % Final     Basophils %   Date/Time Value Ref Range Status   04/21/2025 11:25 AM 1.9 0.0 - 2.0 % Final   03/31/2025 09:43 AM 1.3 0.0 - 2.0 % Final   03/10/2025 10:13 AM 1.5 0.0 - 2.0 %  Final     Neutrophils Absolute   Date/Time Value Ref Range Status   04/21/2025 11:25 AM 4.41 1.60 - 5.50 x10*3/uL Final     Comment:     Percent differential counts (%) should be interpreted in the context of the absolute cell counts (cells/uL).   03/31/2025 09:43 AM 4.47 1.60 - 5.50 x10*3/uL Final     Comment:     Percent differential counts (%) should be interpreted in the context of the absolute cell counts (cells/uL).   03/10/2025 10:13 AM 4.74 1.60 - 5.50 x10*3/uL Final     Comment:     Percent differential counts (%) should be interpreted in the context of the absolute cell counts (cells/uL).     Immature Granulocytes Absolute, Automated   Date/Time Value Ref Range Status   04/21/2025 11:25 AM 0.05 0.00 - 0.50 x10*3/uL Final   03/31/2025 09:43 AM 0.04 0.00 - 0.50 x10*3/uL Final   03/10/2025 10:13 AM 0.06 0.00 - 0.50 x10*3/uL Final     Lymphocytes Absolute   Date/Time Value Ref Range Status   04/21/2025 11:25 AM 2.08 0.80 - 3.00 x10*3/uL Final   03/31/2025 09:43 AM 1.30 0.80 - 3.00 x10*3/uL Final   03/10/2025 10:13 AM 1.63 0.80 - 3.00 x10*3/uL Final     Monocytes Absolute   Date/Time Value Ref Range Status   04/21/2025 11:25 AM 0.86 (H) 0.05 - 0.80 x10*3/uL Final   03/31/2025 09:43 AM 0.79 0.05 - 0.80 x10*3/uL Final   03/10/2025 10:13 AM 0.79 0.05 - 0.80 x10*3/uL Final     Eosinophils Absolute   Date/Time Value Ref Range Status   04/21/2025 11:25 AM 0.46 (H) 0.00 - 0.40 x10*3/uL Final   03/31/2025 09:43 AM 0.49 (H) 0.00 - 0.40 x10*3/uL Final   03/10/2025 10:13 AM 0.60 (H) 0.00 - 0.40 x10*3/uL Final     Eosinophils Absolute, Manual   Date/Time Value Ref Range Status   02/17/2025 09:31 AM 0.60 (H) 0.00 - 0.40 x10*3/uL Final     Basophils Absolute   Date/Time Value Ref Range Status   04/21/2025 11:25 AM 0.15 (H) 0.00 - 0.10 x10*3/uL Final     Comment:     Automated WBC differential has been confirmed by manual smear.   03/31/2025 09:43 AM 0.09 0.00 - 0.10 x10*3/uL Final   03/10/2025 10:13 AM 0.12 (H) 0.00 - 0.10  "x10*3/uL Final     Comment:     Automated WBC differential has been confirmed by manual smear.     Basophils Absolute, Manual   Date/Time Value Ref Range Status   2025 09:31 AM 0.09 0.00 - 0.10 x10*3/uL Final       No components found for: \"PT\"  aPTT   Date/Time Value Ref Range Status   2023 08:20 AM 31 27 - 38 seconds Final   2022 01:05 PM 30 26 - 39 sec Final     Comment:       THE APTT IS NO LONGER USED FOR MONITORING     UNFRACTIONATED HEPARIN THERAPY.    FOR MONITORING HEPARIN THERAPY,     USE THE HEPARIN ASSAY.     2021 07:08 AM 24 (L) 25 - 35 sec Final     Comment:       THE APTT IS NO LONGER USED FOR MONITORING     UNFRACTIONATED HEPARIN THERAPY.    FOR MONITORING HEPARIN THERAPY,     USE THE HEPARIN ASSAY.     2021 08:43 AM 28 25 - 35 sec Final     Comment:       THE APTT IS NO LONGER USED FOR MONITORING     UNFRACTIONATED HEPARIN THERAPY.    FOR MONITORING HEPARIN THERAPY,     USE THE HEPARIN ASSAY.       Medication Documentation Review Audit       Reviewed by Megan Baltazar MA (Medical Assistant) on 25 at 1158      Medication Order Taking? Sig Documenting Provider Last Dose Status   alfuzosin (Uroxatral) 10 mg 24 hr tablet [id number] Yes Take 1 tablet (10 mg) by mouth early in the morning.. Historical Provider, MD  Active   aspirin 81 mg EC tablet [id number] Yes Take 1 tablet (81 mg) by mouth once daily. Historical Provider, MD  Active   BD Ultra-Fine Short Pen Needle 31 gauge x 5/16\" needle [id number] Yes USE ONCE DAILY Jimmy Helm MD  Active   calcitriol (Rocaltrol) 0.25 mcg capsule [id number] Yes TAKE 1 CAPSULE BY MOUTH EVERY other day Siddhartha Burnett MD  Active   carbidopa-levodopa (Sinemet)  mg tablet [id number]  Take by mouth. Historical Provider, MD   25 2224   cyanocobalamin (Vitamin B-12) 1,000 mcg tablet [id number] Yes Take 2 tablets (2,000 mcg) by mouth once daily. Historical Provider, MD  Active   dapagliflozin propanediol (Farxiga) 10 mg " [id number] Yes Take 1 tablet (10 mg) by mouth once daily in the morning. Take before meals. Wilfrido Goodwin MD  Active   dilTIAZem CD (Cardizem CD) 240 mg 24 hr capsule [id number] Yes TAKE 1 CAPSULE BY MOUTH DAILY Wilfrido Goodwin MD  Active   finasteride (Proscar) 5 mg tablet [id number] Yes Take 1 tablet (5 mg) by mouth early in the morning.. Historical Provider, MD  Active   folic acid 0.8 mg capsule [id number] Yes Take 1 tablet by mouth once daily. Historical Provider, MD  Active   gabapentin (Neurontin) 100 mg capsule [id number] Yes Take 2 capsules (200 mg) by mouth 2 times a day. Jimmy Helm MD  Active   glimepiride (Amaryl) 4 mg tablet [id number] Yes Take 1 tablet (4 mg) by mouth 2 times a day. Jimmy Helm MD  Active   hydroCHLOROthiazide (HYDRODiuril) 25 mg tablet [id number] Yes TAKE 1 TABLET BY MOUTH ONCE DAILY Jimmy Helm MD  Active   insulin glargine (Basaglar KwikPen U-100 Insulin) 100 unit/mL (3 mL) pen [id number] Yes Inject 20 Units under the skin once daily at bedtime. JESSICA Rivera-CNP  Active   isosorbide mononitrate ER (Imdur) 30 mg 24 hr tablet [id number] Yes TAKE 1 TABLET BY MOUTH DAILY Wilfrido Goodwin MD  Active   lisinopril 20 mg tablet [id number] Yes Take 1 tablet (20 mg) by mouth once daily. Siddhartha Burnett MD  Active   nitroglycerin (Nitrostat) 0.4 mg SL tablet [id number]  Place 1 tablet (0.4 mg) under the tongue every 5 minutes if needed for chest pain. Wilfrido Goodwin MD   25 5984   pantoprazole (ProtoNix) 40 mg EC tablet [id number] Yes TAKE 1 TABLET BY MOUTH ONCE DAILY Dixon Murray MD  Active   simvastatin (Zocor) 20 mg tablet [id number] Yes Take 1 tablet (20 mg) by mouth once daily at bedtime. Wilfrido Goodwin MD  Active   tamsulosin (Flomax) 0.4 mg 24 hr capsule [id number] Yes Take 1 capsule (0.4 mg) by mouth once daily. Historical Provider, MD  Active                   Assessment/Plan    1) MDS/RARS  -has symptomatic anemia  -failed aranesp  -currently on luspatercept Q3  weeks  -currently being dosed at 1.75 mg/kg  -energy levels have picked up  -however still dealing with pain in his left leg with sciatica like symptoms--he closed his car door on his left leg by accident  -labs to be done today include CBC, COMP  -results reviewed--wbc 8.0, hgb 11.2, plt 177,000, creatinine 1.97, calcium 9.2, alk phos 72, AST 14, total bili 1.3, ALT 4  -benefits, risks, potential morbidity related to luspatercept were reviewed with Chas and he provided informed consent to prceed  -he went on to receive luspatercept 1.75 mg/kg subcutaneous  -he knows that he will not receive a dose if and when his hgb is >=11.5  -he will continue to return Q21 days     2) hereditary hemochromatosis  -had been on therapeutic phlebotomy in the past, however, given current marked anemia, it has not been wise to continue with phlebotomy     3) hyperlipidemia  -on simvastatin     4) hypertension  -on hydrochlorothiazide  -on lisinopril  -on Cartia XT     5) diabetes  -on glimepiride  -on metformin  -on levemir     6) PAD  -on ASA  -s/p multiple stents     Problem List Items Addressed This Visit           ICD-10-CM    MDS (myelodysplastic syndrome), low grade (Multi) D46.Z    Relevant Orders    Clinic Appointment Request Chemo Follow Up; MIKI BOWSER; [facility]    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

## 2025-04-22 ASSESSMENT — ENCOUNTER SYMPTOMS
HEMATOLOGIC/LYMPHATIC NEGATIVE: 1
EYES NEGATIVE: 1
PSYCHIATRIC NEGATIVE: 1
GASTROINTESTINAL NEGATIVE: 1
ARTHRALGIAS: 1
CONSTITUTIONAL NEGATIVE: 1
RESPIRATORY NEGATIVE: 1
CARDIOVASCULAR NEGATIVE: 1
ENDOCRINE NEGATIVE: 1
EXTREMITY WEAKNESS: 1

## 2025-04-28 DIAGNOSIS — Z79.4 DIABETES MELLITUS TREATED WITH INSULIN AND ORAL MEDICATION (MULTI): ICD-10-CM

## 2025-04-28 DIAGNOSIS — Z79.84 DIABETES MELLITUS TREATED WITH INSULIN AND ORAL MEDICATION (MULTI): ICD-10-CM

## 2025-04-28 DIAGNOSIS — E11.9 DIABETES MELLITUS TREATED WITH INSULIN AND ORAL MEDICATION (MULTI): ICD-10-CM

## 2025-04-28 RX ORDER — INSULIN GLARGINE 100 [IU]/ML
20 INJECTION, SOLUTION SUBCUTANEOUS NIGHTLY
Qty: 15 ML | Refills: 4 | Status: SHIPPED | OUTPATIENT
Start: 2025-04-28 | End: 2026-04-28

## 2025-04-29 ENCOUNTER — PATIENT OUTREACH (OUTPATIENT)
Dept: PRIMARY CARE | Facility: CLINIC | Age: 80
End: 2025-04-29
Payer: MEDICARE

## 2025-04-29 DIAGNOSIS — Z79.4 DIABETES MELLITUS TREATED WITH INSULIN AND ORAL MEDICATION (MULTI): ICD-10-CM

## 2025-04-29 DIAGNOSIS — Z79.84 DIABETES MELLITUS TREATED WITH INSULIN AND ORAL MEDICATION (MULTI): ICD-10-CM

## 2025-04-29 DIAGNOSIS — E11.9 DIABETES MELLITUS TREATED WITH INSULIN AND ORAL MEDICATION (MULTI): ICD-10-CM

## 2025-04-29 DIAGNOSIS — G20.A1 PARKINSON'S DISEASE WITHOUT FLUCTUATING MANIFESTATIONS, UNSPECIFIED WHETHER DYSKINESIA PRESENT: ICD-10-CM

## 2025-04-29 PROCEDURE — 99490 CHRNC CARE MGMT STAFF 1ST 20: CPT | Performed by: NURSE PRACTITIONER

## 2025-04-29 NOTE — PROGRESS NOTES
Care Management Monthly Outreach  Chart review completed  Confirmation of at least 2 patient identifiers  Change in insurance? No    Has patient been to ER/Urgent Care since last outreach? No    Last Office Visit with PCP: 2/10/2025   Next Office Visit with PCP: 5/13/2025   APC Collaboration: n/a    Chronic Conditions and Outreach Summary:   Diabetes mellitus treated with insulin and oral medication (Multi)    Parkinson's disease without fluctuating manifestations, unspecified whether dyskinesia present    Patient was seen by endocrinology on 4/8/25 and Rx provided  to increase Basaglar to 30 units at bedtime.  Patient declines to perform home glucose monitoring.  He is aware of goal A1c and A1c on 4/8/25 was 8.0%.   Patient denies falls.  He stays busy by doing things around the home and has grandchildren over at times.    She does report that he has periods of diarrhea which are triggered by caffeine.  We discussed increasing fiber in his diet to bulk the stools and assist with this problem.    Patient reports that his left leg has been bothering him and he gets numbness and tingling to left foot.    Upcoming appointments reviewed with Ortho.     Medications:   Are there medication changes since last visit? No  Refills needed? No    Social Drivers of Health: Complete  Care Gaps Addressed? Complete  Care Plan addressed: Yes    Upcoming Appointments:     Future Appointments       Date / Time Provider Department Dept Phone    5/5/2025 10:30 AM Jimmy Helm MD  [facility] [phone]    [facility]  [phone]    5/13/2025 9:40 AM (Arrive by 9:25 AM) Floridalma Piper, APRN-CNP [facility] [phone]    5/13/2025 3:15 PM (Arrive by 3:00 PM) Ananda Colindres MD [facility] [phone]    6/2/2025 1:00 PM (Arrive by 12:45 PM) Miki Bowser MD [facility]  [phone]    6/2/2025 1:30 PM INF 8A  [facility]  [phone]    7/24/2025 10:00 AM Wilfrido Goodwin MD [facility] [phone]    7/30/2025 10:40 AM Siddhartha Burnett MD [facility]  [phone]          Blood Pressures Reviewed  BP Readings from Last 3 Encounters:   04/21/25 112/65   03/31/25 116/63   03/28/25 93/62     Labs Reviewed:  Lab Results   Component Value Date    CREATININE 1.97 (H) 04/21/2025    GLUCOSE 182 (H) 04/21/2025    ALKPHOS 72 04/21/2025    K 4.1 04/21/2025    PROT 7.3 04/21/2025     04/21/2025    CALCIUM 9.2 04/21/2025    AST 14 04/21/2025    ALT 4 (L) 04/21/2025    BUN 33 (H) 04/21/2025    MG 1.50 (L) 08/22/2022    PHOS 4.0 06/30/2023    GFRMALE 51 (A) 09/11/2023     Lab Results   Component Value Date    TRIG 87 04/22/2024    CHOL 98 04/22/2024    LDLCALC 38 04/22/2024    HDL 42.6 04/22/2024     Lab Results   Component Value Date    HGBA1C 7.2 (H) 11/04/2024    HGBA1C 7.1 (H) 08/21/2024    HGBA1C 8.8 (H) 05/20/2024     Lab Results   Component Value Date    WBC 8.0 04/21/2025    RBC 2.98 (L) 04/21/2025    HGB 11.2 (L) 04/21/2025     04/21/2025   No other concerns at this time.  Agreeable to continue monthly outreaches.  Encouraged to call if questions or concerns arise.    Agnes Tierney RN

## 2025-05-05 ENCOUNTER — APPOINTMENT (OUTPATIENT)
Dept: PRIMARY CARE | Facility: CLINIC | Age: 80
End: 2025-05-05
Payer: MEDICARE

## 2025-05-05 VITALS
HEART RATE: 96 BPM | SYSTOLIC BLOOD PRESSURE: 101 MMHG | WEIGHT: 174.6 LBS | BODY MASS INDEX: 28.06 KG/M2 | HEIGHT: 66 IN | DIASTOLIC BLOOD PRESSURE: 63 MMHG | TEMPERATURE: 97.5 F

## 2025-05-05 DIAGNOSIS — E08.65 DIABETES MELLITUS DUE TO UNDERLYING CONDITION WITH HYPERGLYCEMIA, WITH LONG-TERM CURRENT USE OF INSULIN: ICD-10-CM

## 2025-05-05 DIAGNOSIS — Z87.891 FORMER SMOKER: ICD-10-CM

## 2025-05-05 DIAGNOSIS — E78.2 MIXED HYPERLIPIDEMIA: ICD-10-CM

## 2025-05-05 DIAGNOSIS — I95.9 HYPOTENSION, UNSPECIFIED HYPOTENSION TYPE: ICD-10-CM

## 2025-05-05 DIAGNOSIS — E66.3 OVERWEIGHT WITH BODY MASS INDEX (BMI) OF 28 TO 28.9 IN ADULT: ICD-10-CM

## 2025-05-05 DIAGNOSIS — I10 ESSENTIAL HYPERTENSION: ICD-10-CM

## 2025-05-05 DIAGNOSIS — E04.1 THYROID NODULE: ICD-10-CM

## 2025-05-05 DIAGNOSIS — E08.22 DIABETES MELLITUS DUE TO UNDERLYING CONDITION WITH DIABETIC CHRONIC KIDNEY DISEASE, UNSPECIFIED CKD STAGE, UNSPECIFIED WHETHER LONG TERM INSULIN USE: ICD-10-CM

## 2025-05-05 DIAGNOSIS — R80.8 OTHER PROTEINURIA: ICD-10-CM

## 2025-05-05 DIAGNOSIS — N18.32 STAGE 3B CHRONIC KIDNEY DISEASE (MULTI): ICD-10-CM

## 2025-05-05 DIAGNOSIS — E21.3 HYPERPARATHYROIDISM (MULTI): ICD-10-CM

## 2025-05-05 DIAGNOSIS — Z79.4 DIABETES MELLITUS DUE TO UNDERLYING CONDITION WITH HYPERGLYCEMIA, WITH LONG-TERM CURRENT USE OF INSULIN: ICD-10-CM

## 2025-05-05 PROCEDURE — 1158F ADVNC CARE PLAN TLK DOCD: CPT | Performed by: FAMILY MEDICINE

## 2025-05-05 PROCEDURE — 3074F SYST BP LT 130 MM HG: CPT | Performed by: FAMILY MEDICINE

## 2025-05-05 PROCEDURE — 1159F MED LIST DOCD IN RCRD: CPT | Performed by: FAMILY MEDICINE

## 2025-05-05 PROCEDURE — 3078F DIAST BP <80 MM HG: CPT | Performed by: FAMILY MEDICINE

## 2025-05-05 PROCEDURE — 1036F TOBACCO NON-USER: CPT | Performed by: FAMILY MEDICINE

## 2025-05-05 PROCEDURE — 99213 OFFICE O/P EST LOW 20 MIN: CPT | Performed by: FAMILY MEDICINE

## 2025-05-05 PROCEDURE — 1160F RVW MEDS BY RX/DR IN RCRD: CPT | Performed by: FAMILY MEDICINE

## 2025-05-05 RX ORDER — LISINOPRIL 10 MG/1
10 TABLET ORAL DAILY
Qty: 90 TABLET | Refills: 3 | Status: SHIPPED | OUTPATIENT
Start: 2025-05-05 | End: 2026-05-05

## 2025-05-05 NOTE — PROGRESS NOTES
Pat Hua is here accompanied by his wife for a follow-up on diabetes hypertension chronic kidney disease and Parkinson's disease.  He states that overall he has been feeling well.  He continues on his meds noted.  He did see Dr. Issa for endocrine consultation.  His insulin glargine was increased to 30 units at at bedtime and other medications were continued.  He also follows up with [facility] neurology for Parkinson's disease, Dr. Burnett for nephrology care, Dr. Goodwin for cardiology care and his urologist.  He and his wife agree that he may not be drinking enough fluids.  He does complain of intermittent fecal incontinence which is especially a problem when he is away from the house.    Patient ID: Chas Melgar is a 80 y.o. male who presents for Follow-up (4m follow up):    Problem List Items Addressed This Visit    None     Medical History[1]   Surgical History[2]   Family History[3]   Social History     Socioeconomic History    Marital status:      Spouse name: Not on file    Number of children: Not on file    Years of education: Not on file    Highest education level: Not on file   Occupational History    Not on file   Tobacco Use    Smoking status: Former     Current packs/day: 0.00     Average packs/day: 2.0 packs/day for 20.0 years (40.0 ttl pk-yrs)     Types: Cigarettes     Start date: 1966     Quit date: 1986     Years since quittin.3    Smokeless tobacco: Never   Vaping Use    Vaping status: Never Used   Substance and Sexual Activity    Alcohol use: Yes     Alcohol/week: 2.0 standard drinks of alcohol     Types: 2 Standard drinks or equivalent per week     Comment: weekly    Drug use: Never    Sexual activity: Not Currently     Partners: Female   Other Topics Concern    Not on file   Social History Narrative    Not on file     Social Drivers of Health     Financial Resource Strain: Not on file   Food Insecurity: Not on file   Transportation Needs: Not on file   Physical  Activity: Not on file   Stress: Not on file   Social Connections: Not on file   Intimate Partner Violence: Not on file   Housing Stability: Not on file      Aluminum and Nickel   Current Medications[4]    Immunization History   Administered Date(s) Administered    COVID-19, mRNA, LNP-S, PF, 30 mcg/0.3 mL dose 03/26/2021    Flu vaccine, quadrivalent, high-dose, preservative free, age 65y+ (FLUZONE) 10/28/2020, 11/10/2023    Flu vaccine, trivalent, preservative free, HIGH-DOSE, age 65y+ (Fluzone) 11/16/2017, 09/20/2018, 11/29/2019, 10/28/2024    Hep A / Hep B 05/08/2014, 06/16/2014, 11/12/2014    Influenza, Unspecified 10/01/2011, 10/03/2012, 10/01/2013, 10/01/2014, 10/01/2015, 10/14/2016    Pfizer Purple Cap SARS-CoV-2 02/27/2021, 01/22/2022    Pneumococcal conjugate vaccine, 13-valent (PREVNAR 13) 11/30/2016    Pneumococcal polysaccharide vaccine, 23-valent, age 2 years and older (PNEUMOVAX 23) 06/19/2011        Review of Systems   Constitutional:  Positive for fatigue.   HENT: Negative.     Eyes: Negative.    Respiratory: Negative.     Cardiovascular: Negative.    Gastrointestinal:  Positive for diarrhea.   Endocrine: Negative.    Genitourinary: Negative.    Musculoskeletal: Negative.    Skin: Negative.    Allergic/Immunologic: Negative.    Neurological:  Positive for weakness and light-headedness.   Hematological: Negative.    Psychiatric/Behavioral: Negative.     All other systems reviewed and are negative.       Vitals:    05/05/25 1002   BP: 83/52   Pulse: 96   Temp: 36.4 °C (97.5 °F)     Vitals:    05/05/25 1002   Weight: 79.2 kg (174 lb 9.6 oz)      Physical Exam  Constitutional:       General: He is not in acute distress.     Appearance: Normal appearance.   Cardiovascular:      Rate and Rhythm: Normal rate and regular rhythm.      Pulses: Normal pulses.      Heart sounds: Murmur (Regular S1-S2 with a 1/6 systolic ejection murmur at the left sternal border) heard.      No friction rub. No gallop.    Pulmonary:      Effort: Pulmonary effort is normal. No respiratory distress.      Breath sounds: Normal breath sounds. No wheezing or rales.   Musculoskeletal:      Cervical back: Neck supple.      Left lower leg: Edema (Chronic 1-2+ pitting edema of his left lower leg.) present.   Neurological:      General: No focal deficit present.      Mental Status: He is alert and oriented to person, place, and time. Mental status is at baseline.   Psychiatric:         Mood and Affect: Mood normal.         Thought Content: Thought content normal.         Judgment: Judgment normal.          ASSESSMENT/PLAN: Hypertension with low reading.  Decrease lisinopril to 10 mg daily.  Begin regular home blood pressure checks.  Increase fluid intake.  Patient will have a recheck blood pressure at his hematologist office next week.    Chronic diarrhea and fecal incontinence.  Continue depends garments.  Discussed fiber intake and he will try Metamucil 1 teaspoon daily.  He is aware that this might also loosen his stools and he will discontinue this if it occurs.  Call if this is not helpful for his fecal incontinence    Diabetes mellitus type 2 stable with A1c 7.2.  Continue current meds noted.    Chronic kidney disease followed by Dr. Burnett    Parkinson's disease and unsteady gait managed by [facility] neurology.  Patient is to use his walker at all times.    Chronic anemia and myelodysplastic syndrome currently stable and followed by hematology    Peripheral neuropathy also managed by neurology    Coronary artery disease followed by Dr. Goodwin.    Check TSH lipid profile A1c and CMP.  Recommended shingles vaccination update  Follow-up in 4 months and call as needed       Scribe Attestation  By signing my name below, I, Orly Alvarenga LPN, Scribe   attest that this documentation has been prepared under the direction and in the presence of Jimmy Helm MD.         [1]   Past Medical History:  Diagnosis Date    Acute gastric ulcer  with hemorrhage 09/07/2022    Acute gastric ulcer with bleeding    Acute upper respiratory infection, unspecified     URI, acute    Arthritis     Body mass index (BMI) 28.0-28.9, adult 10/19/2021    BMI 28.0-28.9,adult    Cancer (Multi)     Coronary artery disease     Diabetes mellitus (Multi)     Encounter for other preprocedural examination 10/14/2021    Pre-operative clearance    Encounter for screening for malignant neoplasm of prostate     Encounter for prostate cancer screening    Impingement syndrome of unspecified shoulder 07/22/2021    Impingement syndrome of shoulder region, unspecified laterality    Other abnormalities of breathing     Difficulty breathing    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)     Other specified disorders of pancreatic internal secretion    Other specified follicular disorders     Actinic folliculitis    Other specified postprocedural states 06/09/2021    Status post arthroscopy of left shoulder    Overweight 02/21/2022    Overweight with body mass index (BMI) of 29 to 29.9 in adult    Overweight 01/18/2022    Overweight with body mass index (BMI) of 28 to 28.9 in adult    Pain in right hip     Hip pain, right    Pain in right leg     Pain of right lower extremity    Pain in unspecified hip 08/04/2021    Hip joint pain    Pain in unspecified shoulder 08/10/2021    Shoulder pain    Personal history of malignant neoplasm, unspecified 08/04/2021    History of malignant neoplasm    Personal history of other diseases of male genital organs     History of benign prostatic hyperplasia    Personal history of other diseases of the circulatory system 08/04/2021    History of hypertension    Personal history of other diseases of the circulatory system 08/04/2021    History of cardiac disorder    Personal history of other diseases of the digestive system 10/14/2021    History of gastrointestinal hemorrhage    Personal history of other diseases of the digestive  system 02/26/2022    History of right inguinal hernia    Personal history of other diseases of the digestive system     History of fatty infiltration of liver    Personal history of other diseases of the musculoskeletal system and connective tissue 08/04/2021    History of arthritis    Personal history of other diseases of the musculoskeletal system and connective tissue 08/05/2021    History of rotator cuff tear    Personal history of other diseases of the respiratory system 11/17/2021    History of upper respiratory infection    Personal history of other diseases of the respiratory system     History of acute bronchitis    Personal history of other diseases of the respiratory system     History of acute pharyngitis    Personal history of other diseases of the respiratory system     History of acute sinusitis    Personal history of other diseases of urinary system 09/01/2022    History of renal insufficiency syndrome    Personal history of other endocrine, nutritional and metabolic disease 10/06/2021    History of diabetes mellitus    Personal history of other endocrine, nutritional and metabolic disease 01/18/2022    History of hyperkalemia    Personal history of other malignant neoplasm of skin     History of other malignant neoplasm of skin    Personal history of other specified conditions 09/01/2022    History of chest pain    Personal history of other specified conditions     History of dysuria    Personal history of other specified conditions     History of chest pain    Persons encountering health services in other specified circumstances     Encounter for support and coordination of transition of care    Primary osteoarthritis, 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    DJD of left shoulder    Right lower quadrant pain 02/26/2022    Right inguinal pain    Strain of muscle, fascia and tendon of lower back, initial encounter     Lumbar strain    Strain of muscle, fascia and tendon of other parts of biceps, right arm,  "initial encounter 10/14/2021    Rupture of right biceps tendon, initial encounter    Trochanteric bursitis, left hip 08/10/2021    Trochanteric bursitis of left hip   [2]   Past Surgical History:  Procedure Laterality Date    CT ANGIO AORTA AND BILATERAL ILIOFEMORAL RUN OFF INCLUDING WITHOUT CONTRAST IF PERFORMED  10/27/2020    CT AORTA AND BILATERAL ILIOFEMORAL RUNOFF ANGIOGRAM W AND/OR WO IV CONTRAST 10/27/2020 [facility]    OTHER SURGICAL HISTORY  11/07/2022    Colonoscopy    OTHER SURGICAL HISTORY  10/14/2021    Cardiac catheterization with stent placement    OTHER SURGICAL HISTORY  10/14/2021    PTA femoral artery    OTHER SURGICAL HISTORY  10/14/2021    Rotator cuff repair    OTHER SURGICAL HISTORY  10/14/2021    Inguinal hernia repair    OTHER SURGICAL HISTORY  10/14/2021    Knee arthroscopy    OTHER SURGICAL HISTORY  11/17/2021    Skin biopsy    ROTATOR CUFF REPAIR     [3]   Family History  Problem Relation Name Age of Onset    Diabetes Mother Melinda andino     Coronary artery disease Father      Hypertension Other      Alzheimer's disease Other     [4]   Current Outpatient Medications   Medication Sig Dispense Refill    alfuzosin (Uroxatral) 10 mg 24 hr tablet Take 1 tablet (10 mg) by mouth early in the morning..      aspirin 81 mg EC tablet Take 1 tablet (81 mg) by mouth once daily.      BD Ultra-Fine Short Pen Needle 31 gauge x 5/16\" needle USE ONCE DAILY 100 each 3    calcitriol (Rocaltrol) 0.25 mcg capsule TAKE 1 CAPSULE BY MOUTH EVERY other day 45 capsule 3    carbidopa-levodopa (Sinemet)  mg tablet Take by mouth. (Patient taking differently: Take 1 tablet by mouth 3 times a day.)      cyanocobalamin (Vitamin B-12) 1,000 mcg tablet Take 2 tablets (2,000 mcg) by mouth once daily.      dapagliflozin propanediol (Farxiga) 10 mg Take 1 tablet (10 mg) by mouth once daily in the morning. Take before meals. 90 tablet 3    dilTIAZem CD (Cardizem CD) 240 mg 24 hr capsule TAKE 1 CAPSULE BY MOUTH " DAILY 270 capsule 0    finasteride (Proscar) 5 mg tablet Take 1 tablet (5 mg) by mouth early in the morning..      folic acid 0.8 mg capsule Take 1 tablet by mouth once daily.      gabapentin (Neurontin) 100 mg capsule Take 2 capsules (200 mg) by mouth 2 times a day. (Patient taking differently: Take 2 capsules (200 mg) by mouth if needed.) 120 capsule 1    glimepiride (Amaryl) 4 mg tablet Take 1 tablet (4 mg) by mouth 2 times a day. 180 tablet 1    hydroCHLOROthiazide (HYDRODiuril) 25 mg tablet TAKE 1 TABLET BY MOUTH ONCE DAILY 90 tablet 1    insulin glargine (Basaglar KwikPen U-100 Insulin) 100 unit/mL (3 mL) pen Inject 20 Units under the skin once daily at bedtime. (Patient taking differently: Inject 30 Units under the skin once daily at bedtime.) 15 mL 4    isosorbide mononitrate ER (Imdur) 30 mg 24 hr tablet TAKE 1 TABLET BY MOUTH DAILY 270 tablet 0    lisinopril 20 mg tablet Take 1 tablet (20 mg) by mouth once daily. 90 tablet 3    pantoprazole (ProtoNix) 40 mg EC tablet TAKE 1 TABLET BY MOUTH ONCE DAILY 90 tablet 3    simvastatin (Zocor) 20 mg tablet Take 1 tablet (20 mg) by mouth once daily at bedtime. 90 tablet 3    tamsulosin (Flomax) 0.4 mg 24 hr capsule Take 1 capsule (0.4 mg) by mouth once daily.      nitroglycerin (Nitrostat) 0.4 mg SL tablet Place 1 tablet (0.4 mg) under the tongue every 5 minutes if needed for chest pain. 25 tablet 5     No current facility-administered medications for this visit.

## 2025-05-06 ASSESSMENT — ENCOUNTER SYMPTOMS
HEMATOLOGIC/LYMPHATIC NEGATIVE: 1
EYES NEGATIVE: 1
ENDOCRINE NEGATIVE: 1
DIARRHEA: 1
ALLERGIC/IMMUNOLOGIC NEGATIVE: 1
LIGHT-HEADEDNESS: 1
RESPIRATORY NEGATIVE: 1
PSYCHIATRIC NEGATIVE: 1
WEAKNESS: 1
FATIGUE: 1
MUSCULOSKELETAL NEGATIVE: 1
CARDIOVASCULAR NEGATIVE: 1

## 2025-05-07 LAB
ALBUMIN SERPL-MCNC: 4.4 G/DL (ref 3.6–5.1)
ALP SERPL-CCNC: 75 U/L (ref 35–144)
ALT SERPL-CCNC: 12 U/L (ref 9–46)
ANION GAP SERPL CALCULATED.4IONS-SCNC: 12 MMOL/L (CALC) (ref 7–17)
AST SERPL-CCNC: 11 U/L (ref 10–35)
BILIRUB SERPL-MCNC: 1.4 MG/DL (ref 0.2–1.2)
BUN SERPL-MCNC: 53 MG/DL (ref 7–25)
CALCIUM SERPL-MCNC: 9.4 MG/DL (ref 8.6–10.3)
CHLORIDE SERPL-SCNC: 100 MMOL/L (ref 98–110)
CHOLEST SERPL-MCNC: 84 MG/DL
CHOLEST/HDLC SERPL: 2.3 (CALC)
CO2 SERPL-SCNC: 23 MMOL/L (ref 20–32)
CREAT SERPL-MCNC: 2.37 MG/DL (ref 0.7–1.22)
EGFRCR SERPLBLD CKD-EPI 2021: 27 ML/MIN/1.73M2
EST. AVERAGE GLUCOSE BLD GHB EST-MCNC: 180 MG/DL
EST. AVERAGE GLUCOSE BLD GHB EST-SCNC: 10 MMOL/L
GLUCOSE SERPL-MCNC: 223 MG/DL (ref 65–99)
HBA1C MFR BLD: 7.9 %
HDLC SERPL-MCNC: 37 MG/DL
LDLC SERPL CALC-MCNC: 31 MG/DL (CALC)
NONHDLC SERPL-MCNC: 47 MG/DL (CALC)
POTASSIUM SERPL-SCNC: 4.6 MMOL/L (ref 3.5–5.3)
PROT SERPL-MCNC: 6.9 G/DL (ref 6.1–8.1)
SODIUM SERPL-SCNC: 135 MMOL/L (ref 135–146)
TRIGL SERPL-MCNC: 82 MG/DL
TSH SERPL-ACNC: 3.16 MIU/L (ref 0.4–4.5)

## 2025-05-09 DIAGNOSIS — Z79.4 DIABETES MELLITUS TREATED WITH INSULIN AND ORAL MEDICATION (MULTI): ICD-10-CM

## 2025-05-09 DIAGNOSIS — E11.9 DIABETES MELLITUS TREATED WITH INSULIN AND ORAL MEDICATION (MULTI): ICD-10-CM

## 2025-05-09 DIAGNOSIS — Z79.84 DIABETES MELLITUS TREATED WITH INSULIN AND ORAL MEDICATION (MULTI): ICD-10-CM

## 2025-05-10 RX ORDER — INSULIN GLARGINE 100 [IU]/ML
30 INJECTION, SOLUTION SUBCUTANEOUS NIGHTLY
Qty: 15 ML | Refills: 3 | Status: SHIPPED | OUTPATIENT
Start: 2025-05-10

## 2025-05-12 ENCOUNTER — LAB (OUTPATIENT)
Dept: LAB | Facility: CLINIC | Age: 80
End: 2025-05-12
Payer: MEDICARE

## 2025-05-12 ENCOUNTER — INFUSION (OUTPATIENT)
Dept: HEMATOLOGY/ONCOLOGY | Facility: CLINIC | Age: 80
End: 2025-05-12
Payer: MEDICARE

## 2025-05-12 VITALS
HEART RATE: 80 BPM | BODY MASS INDEX: 28.02 KG/M2 | OXYGEN SATURATION: 95 % | RESPIRATION RATE: 16 BRPM | DIASTOLIC BLOOD PRESSURE: 64 MMHG | TEMPERATURE: 96.8 F | WEIGHT: 173.61 LBS | SYSTOLIC BLOOD PRESSURE: 119 MMHG

## 2025-05-12 DIAGNOSIS — D46.1 REFRACTORY ANEMIA WITH RING SIDEROBLASTS: ICD-10-CM

## 2025-05-12 DIAGNOSIS — D46.Z MDS (MYELODYSPLASTIC SYNDROME), LOW GRADE (MULTI): ICD-10-CM

## 2025-05-12 LAB
ALBUMIN SERPL BCP-MCNC: 4.5 G/DL (ref 3.4–5)
ALP SERPL-CCNC: 72 U/L (ref 33–136)
ALT SERPL W P-5'-P-CCNC: <3 U/L (ref 10–52)
ANION GAP SERPL CALC-SCNC: 16 MMOL/L (ref 10–20)
AST SERPL W P-5'-P-CCNC: 10 U/L (ref 9–39)
BASO STIPL BLD QL SMEAR: PRESENT
BASOPHILS # BLD AUTO: 0.11 X10*3/UL (ref 0–0.1)
BASOPHILS NFR BLD AUTO: 1.5 %
BILIRUB SERPL-MCNC: 1.6 MG/DL (ref 0–1.2)
BUN SERPL-MCNC: 52 MG/DL (ref 6–23)
CALCIUM SERPL-MCNC: 9.4 MG/DL (ref 8.6–10.3)
CHLORIDE SERPL-SCNC: 102 MMOL/L (ref 98–107)
CO2 SERPL-SCNC: 24 MMOL/L (ref 21–32)
CREAT SERPL-MCNC: 2.11 MG/DL (ref 0.5–1.3)
EGFRCR SERPLBLD CKD-EPI 2021: 31 ML/MIN/1.73M*2
EOSINOPHIL # BLD AUTO: 0.5 X10*3/UL (ref 0–0.4)
EOSINOPHIL NFR BLD AUTO: 6.8 %
ERYTHROCYTE [DISTWIDTH] IN BLOOD BY AUTOMATED COUNT: 21.5 % (ref 11.5–14.5)
GLUCOSE SERPL-MCNC: 166 MG/DL (ref 74–99)
HCT VFR BLD AUTO: 31.9 % (ref 41–52)
HGB BLD-MCNC: 10.8 G/DL (ref 13.5–17.5)
HYPOCHROMIA BLD QL SMEAR: NORMAL
IMM GRANULOCYTES # BLD AUTO: 0.03 X10*3/UL (ref 0–0.5)
IMM GRANULOCYTES NFR BLD AUTO: 0.4 % (ref 0–0.9)
LYMPHOCYTES # BLD AUTO: 1.43 X10*3/UL (ref 0.8–3)
LYMPHOCYTES NFR BLD AUTO: 19.5 %
MCH RBC QN AUTO: 38 PG (ref 26–34)
MCHC RBC AUTO-ENTMCNC: 33.9 G/DL (ref 32–36)
MCV RBC AUTO: 112 FL (ref 80–100)
MONOCYTES # BLD AUTO: 0.83 X10*3/UL (ref 0.05–0.8)
MONOCYTES NFR BLD AUTO: 11.3 %
NEUTROPHILS # BLD AUTO: 4.43 X10*3/UL (ref 1.6–5.5)
NEUTROPHILS NFR BLD AUTO: 60.5 %
NRBC BLD-RTO: ABNORMAL /100{WBCS}
OVALOCYTES BLD QL SMEAR: NORMAL
PLATELET # BLD AUTO: 180 X10*3/UL (ref 150–450)
POLYCHROMASIA BLD QL SMEAR: NORMAL
POTASSIUM SERPL-SCNC: 4.5 MMOL/L (ref 3.5–5.3)
PROT SERPL-MCNC: 7.2 G/DL (ref 6.4–8.2)
RBC # BLD AUTO: 2.84 X10*6/UL (ref 4.5–5.9)
RBC MORPH BLD: NORMAL
SODIUM SERPL-SCNC: 137 MMOL/L (ref 136–145)
STOMATOCYTES BLD QL SMEAR: NORMAL
TARGETS BLD QL SMEAR: NORMAL
WBC # BLD AUTO: 7.3 X10*3/UL (ref 4.4–11.3)

## 2025-05-12 PROCEDURE — 36415 COLL VENOUS BLD VENIPUNCTURE: CPT

## 2025-05-12 PROCEDURE — 96372 THER/PROPH/DIAG INJ SC/IM: CPT

## 2025-05-12 PROCEDURE — 85025 COMPLETE CBC W/AUTO DIFF WBC: CPT

## 2025-05-12 PROCEDURE — 80053 COMPREHEN METABOLIC PANEL: CPT

## 2025-05-12 PROCEDURE — 2500000004 HC RX 250 GENERAL PHARMACY W/ HCPCS (ALT 636 FOR OP/ED): Mod: JW,TB | Performed by: INTERNAL MEDICINE

## 2025-05-12 RX ORDER — EPINEPHRINE 0.3 MG/.3ML
0.3 INJECTION SUBCUTANEOUS EVERY 5 MIN PRN
Status: DISCONTINUED | OUTPATIENT
Start: 2025-05-12 | End: 2025-05-12 | Stop reason: HOSPADM

## 2025-05-12 RX ORDER — ALBUTEROL SULFATE 0.83 MG/ML
3 SOLUTION RESPIRATORY (INHALATION) AS NEEDED
Status: DISCONTINUED | OUTPATIENT
Start: 2025-05-12 | End: 2025-05-12 | Stop reason: HOSPADM

## 2025-05-12 RX ORDER — DIPHENHYDRAMINE HYDROCHLORIDE 50 MG/ML
50 INJECTION, SOLUTION INTRAMUSCULAR; INTRAVENOUS AS NEEDED
OUTPATIENT
Start: 2025-06-02

## 2025-05-12 RX ORDER — FAMOTIDINE 10 MG/ML
20 INJECTION, SOLUTION INTRAVENOUS ONCE AS NEEDED
OUTPATIENT
Start: 2025-06-02

## 2025-05-12 RX ORDER — ALBUTEROL SULFATE 0.83 MG/ML
3 SOLUTION RESPIRATORY (INHALATION) AS NEEDED
OUTPATIENT
Start: 2025-06-02

## 2025-05-12 RX ORDER — FAMOTIDINE 10 MG/ML
20 INJECTION, SOLUTION INTRAVENOUS ONCE AS NEEDED
Status: DISCONTINUED | OUTPATIENT
Start: 2025-05-12 | End: 2025-05-12 | Stop reason: HOSPADM

## 2025-05-12 RX ORDER — DIPHENHYDRAMINE HYDROCHLORIDE 50 MG/ML
50 INJECTION, SOLUTION INTRAMUSCULAR; INTRAVENOUS AS NEEDED
Status: DISCONTINUED | OUTPATIENT
Start: 2025-05-12 | End: 2025-05-12 | Stop reason: HOSPADM

## 2025-05-12 RX ORDER — EPINEPHRINE 0.3 MG/.3ML
0.3 INJECTION SUBCUTANEOUS EVERY 5 MIN PRN
OUTPATIENT
Start: 2025-06-02

## 2025-05-12 RX ADMIN — LUSPATERCEPT 137.5 MG: 75 INJECTION, POWDER, LYOPHILIZED, FOR SOLUTION SUBCUTANEOUS at 12:09

## 2025-05-12 ASSESSMENT — PAIN SCALES - GENERAL: PAINLEVEL_OUTOF10: 0-NO PAIN

## 2025-05-13 ENCOUNTER — OFFICE VISIT (OUTPATIENT)
Dept: ORTHOPEDIC SURGERY | Facility: CLINIC | Age: 80
End: 2025-05-13
Payer: MEDICARE

## 2025-05-13 ENCOUNTER — APPOINTMENT (OUTPATIENT)
Dept: PRIMARY CARE | Facility: CLINIC | Age: 80
End: 2025-05-13
Payer: MEDICARE

## 2025-05-13 VITALS
BODY MASS INDEX: 28.25 KG/M2 | WEIGHT: 175 LBS | OXYGEN SATURATION: 91 % | HEART RATE: 82 BPM | SYSTOLIC BLOOD PRESSURE: 103 MMHG | DIASTOLIC BLOOD PRESSURE: 62 MMHG | TEMPERATURE: 97 F | RESPIRATION RATE: 20 BRPM

## 2025-05-13 DIAGNOSIS — D46.1 REFRACTORY ANEMIA WITH RING SIDEROBLASTS: ICD-10-CM

## 2025-05-13 DIAGNOSIS — M17.12 PRIMARY OSTEOARTHRITIS OF LEFT KNEE: Primary | ICD-10-CM

## 2025-05-13 DIAGNOSIS — E08.65 DIABETES MELLITUS DUE TO UNDERLYING CONDITION WITH HYPERGLYCEMIA, WITH LONG-TERM CURRENT USE OF INSULIN: ICD-10-CM

## 2025-05-13 DIAGNOSIS — N18.32 STAGE 3B CHRONIC KIDNEY DISEASE (MULTI): ICD-10-CM

## 2025-05-13 DIAGNOSIS — M17.0 PRIMARY OSTEOARTHRITIS OF BOTH KNEES: ICD-10-CM

## 2025-05-13 DIAGNOSIS — D46.Z MDS (MYELODYSPLASTIC SYNDROME), LOW GRADE (MULTI): ICD-10-CM

## 2025-05-13 DIAGNOSIS — E11.9 DIABETES MELLITUS TREATED WITH INSULIN AND ORAL MEDICATION (MULTI): ICD-10-CM

## 2025-05-13 DIAGNOSIS — I20.9 ANGINA, CLASS II: ICD-10-CM

## 2025-05-13 DIAGNOSIS — Z79.4 DIABETES MELLITUS TREATED WITH INSULIN AND ORAL MEDICATION (MULTI): ICD-10-CM

## 2025-05-13 DIAGNOSIS — Z79.84 DIABETES MELLITUS TREATED WITH INSULIN AND ORAL MEDICATION (MULTI): ICD-10-CM

## 2025-05-13 DIAGNOSIS — I10 ESSENTIAL HYPERTENSION: Primary | ICD-10-CM

## 2025-05-13 DIAGNOSIS — Z79.4 DIABETES MELLITUS DUE TO UNDERLYING CONDITION WITH HYPERGLYCEMIA, WITH LONG-TERM CURRENT USE OF INSULIN: ICD-10-CM

## 2025-05-13 PROCEDURE — 99213 OFFICE O/P EST LOW 20 MIN: CPT | Performed by: ORTHOPAEDIC SURGERY

## 2025-05-13 PROCEDURE — 99212 OFFICE O/P EST SF 10 MIN: CPT | Performed by: ORTHOPAEDIC SURGERY

## 2025-05-13 PROCEDURE — 99213 OFFICE O/P EST LOW 20 MIN: CPT | Performed by: NURSE PRACTITIONER

## 2025-05-13 PROCEDURE — 1159F MED LIST DOCD IN RCRD: CPT | Performed by: ORTHOPAEDIC SURGERY

## 2025-05-13 ASSESSMENT — ENCOUNTER SYMPTOMS
COUGH: 0
PALPITATIONS: 0
NUMBNESS: 1
SHORTNESS OF BREATH: 1
FATIGUE: 1
LIGHT-HEADEDNESS: 1
DIARRHEA: 1
ARTHRALGIAS: 1
WEAKNESS: 1

## 2025-05-13 ASSESSMENT — PATIENT HEALTH QUESTIONNAIRE - PHQ9
SUM OF ALL RESPONSES TO PHQ9 QUESTIONS 1 AND 2: 0
2. FEELING DOWN, DEPRESSED OR HOPELESS: NOT AT ALL
1. LITTLE INTEREST OR PLEASURE IN DOING THINGS: NOT AT ALL

## 2025-05-13 NOTE — ASSESSMENT & PLAN NOTE
Reviewed recent labs, most recently done at Dr. Bowser's office yesterday. Hgb fairly stable at 10.8. He had Luspatercept infusion yesterday at that office.  Pt. States he feels pretty good as long as Hgb is at least in the 10s.

## 2025-05-13 NOTE — PROGRESS NOTES
Subjective   Patient ID: Chas Melgar is a 80 y.o. male who presents for Follow-up (3 month follow fu. He does not monitor his sugars. His lisinopril was recently changed from 30 mg to 10 because his BP was low at his last hematology visit. No questions, concerns, or complaints. /).  HPI Patient follows with me for geriatric care as needed. He continues to follow with Dr. Helm as his PCP. He has no specific requests from me todfay.     Review of Systems   Constitutional:  Positive for fatigue.   Respiratory:  Positive for shortness of breath (chronic, with exertion). Negative for cough.    Cardiovascular:  Positive for leg swelling (intermittent, worse in evenings.). Negative for chest pain and palpitations.   Gastrointestinal:  Positive for diarrhea.   Musculoskeletal:  Positive for arthralgias (celeste knees, left hip.) and gait problem (States he has been walking a bit more. Able to walk short).   Neurological:  Positive for weakness, light-headedness and numbness (lower legs/feet. Pt states it's intermittent.).   Objective   Vitals:  /62   Pulse 82   Temp 36.1 °C (97 °F)   Resp 20   Wt 79.4 kg (175 lb)   SpO2 91%   BMI 28.25 kg/m²     Physical Exam  Vitals reviewed.   Constitutional:       Appearance: Normal appearance. He is not ill-appearing.   HENT:      Head: Normocephalic.   Eyes:      Conjunctiva/sclera: Conjunctivae normal.   Cardiovascular:      Rate and Rhythm: Normal rate and regular rhythm.      Pulses:           Posterior tibial pulses are 1+ on the right side and 1+ on the left side.      Heart sounds: S1 normal and S2 normal. No murmur heard.  Pulmonary:      Effort: Pulmonary effort is normal.      Breath sounds: Normal breath sounds.   Abdominal:      General: Bowel sounds are normal.      Palpations: Abdomen is soft.   Musculoskeletal:      Cervical back: Neck supple.      Right lower leg: No edema.      Left lower leg: No edema.   Lymphadenopathy:      Cervical: No cervical  adenopathy.   Skin:     General: Skin is warm and dry.   Neurological:      General: No focal deficit present.      Mental Status: He is alert and oriented to person, place, and time.   Psychiatric:         Mood and Affect: Mood normal.         Thought Content: Thought content normal.       CBC -  Recent Labs     05/12/25  1025 04/21/25  1125 03/31/25  0943   WBC 7.3 8.0 7.2   HGB 10.8* 11.2* 10.7*   HCT 31.9* 34.4* 31.6*    177 155   * 115* 113*       CMP -  Recent Labs     05/12/25  1025 05/06/25  0900 04/21/25  1125 08/23/22  0533 08/22/22  1305 10/07/21  0938 08/23/21  0632 08/22/21  0853    135 137   < > 137   < > 137 134*   K 4.5 4.6 4.1   < > 4.1   < > 4.2 4.6    100 103   < > 102   < > 105 104   CO2 24 23 22   < > 24   < > 24 21   ANIONGAP 16 12 16   < > 15   < > 12 14   BUN 52* 53* 33*   < > 33*   < > 23 31*   CREATININE 2.11* 2.37* 1.97*   < > 1.60*   < > 1.24 1.33*   EGFR 31* 27* 34*   < >  --   --   --   --    MG  --   --   --   --  1.50*  --  1.80 1.70    < > = values in this interval not displayed.     Recent Labs     05/12/25  1025 05/06/25  0900 04/21/25  1125   ALBUMIN 4.5 4.4 4.6   ALKPHOS 72 75 72   ALT <3* 12 4*   AST 10 11 14   BILITOT 1.6* 1.4* 1.3*       LIPID PANEL -   Recent Labs     05/06/25  0900 04/22/24  0855 12/22/23  0854 03/02/23  1009 05/24/22  0912 10/07/21  0938   CHOL 84 98 88 63 92 94   LDLCALC 31 38 33  --   --   --    LDLF  --   --   --  19 40 38   HDL 37* 42.6 41.5 32.3* 39.0* 42.0   TRIG 82 87 66 60 64 72       Recent Labs     05/06/25  0900 11/04/24  1107 08/21/24  1017 10/31/22  0937 08/22/22  1305   BNP  --   --   --   --  66   HGBA1C 7.9* 7.2* 7.1*   < >  --     < > = values in this interval not displayed.       Lab Results   Component Value Date    [id number] 1,731 (H) 09/03/2024       Lab Results   Component Value Date    VITD25 31 03/14/2024        Assessment/Plan   Problem List Items Addressed This Visit       Angina, class II (CMS-HCC)     Current Assessment & Plan   Denies any CP since last visit.         Stage 3b chronic kidney disease (Multi)    Essential hypertension - Primary    Overview   Managed w/ lisinopril 30 mg, diltiazem  mg, Imdur 30 mg, hydrochlorothiazide 20 mg         Diabetes mellitus treated with insulin and oral medication (Multi)    MDS (myelodysplastic syndrome), low grade (Multi)    Overview   Follows w/ Dr. Miki Bowser         Refractory anemia with ring sideroblasts    Overview   Follows with Dr. Bowser. Previously was on Aranesp, but states it was no longer effective. Started Luspatercept infusion 5/12/2025.         Current Assessment & Plan   Reviewed recent labs, most recently done at Dr. Bowser's office yesterday. Hgb fairly stable at 10.8. He had Luspatercept infusion yesterday at that office.  Pt. States he feels pretty good as long as Hgb is at least in the 10s.          Diabetes mellitus due to underlying condition with hyperglycemia, with long-term current use of insulin    Osteoarthritis of both knees    Overview   Follows w/ Dr. Ananda Colindres. Has had gel injections.         Current Assessment & Plan   Pt. States pain in his left knee has improved some since his last visit here. He has been walking a bit more than he had been. He dose have a follow-up appointment with Dr. Colindres later today.                 JESSICA Rivera-CNP 05/13/25 11:00 AM

## 2025-05-13 NOTE — ASSESSMENT & PLAN NOTE
Pt. States pain in his left knee has improved some since his last visit here. He has been walking a bit more than he had been. He dose have a follow-up appointment with Dr. Colindres later today.

## 2025-05-14 NOTE — PROGRESS NOTES
History of present illness    80-year-old male here for left knee pain follow-up states his left knee is getting better he has noticed significant improvement over the past few weeks states the pain is a 1 out of 10 currently.  He had Gelsyn in November 2024.  He is still taking multiple medications including gabapentin.  He has had some issues with some clunking in his right knee but is not really painful he has noted some soreness in his left hip but that does not seem to be the point where he wants to get checked out he is continue to work on his exercise program.      Past medical , Surgical, Family and social history reviewed.      Physical exam  General: No acute distress and breathing comfortably.  Patient is pleasant and cooperative with the examination.    Extremity  Left knee has mild crepitus no pain with varus valgus stress brisk cap refill compartments are soft calf is nontender lacks about 5 degrees of full extension flexion 95    Diagnostics      Imaging  No results found.    Cardiology, Vascular, and Other Imaging  No other imaging results found for the past 7 days       Procedure  [ none]    Assessment  Left knee arthritis    Treatment plan  1.  The natural history of the condition and its associated treatment alternatives including surgical and nonsurgical options were discussed with the patient at length.  2.  Continue working on range of motion strengthening continue weight-bear as tolerated continue with his exercise program if his symptoms return he will let me know otherwise follow-up as needed.  3. [   ]  4.  All of the patient's questions were answered.    No orders of the defined types were placed in this encounter.      This note was prepared using voice recognition software.  The details of this note are correct and have been reviewed, and corrected to the best of my ability.  Some grammatical areas may persist related to the Dragon software    Ananda Colindres,  MD  Senior Attending Physician  [facility]  Orthopedic [place]    [phone]

## 2025-05-21 ENCOUNTER — PATIENT OUTREACH (OUTPATIENT)
Dept: PRIMARY CARE | Facility: CLINIC | Age: 80
End: 2025-05-21
Payer: MEDICARE

## 2025-05-21 DIAGNOSIS — I10 ESSENTIAL HYPERTENSION: ICD-10-CM

## 2025-05-21 DIAGNOSIS — Z79.4 DIABETES MELLITUS DUE TO UNDERLYING CONDITION WITH HYPERGLYCEMIA, WITH LONG-TERM CURRENT USE OF INSULIN: ICD-10-CM

## 2025-05-21 DIAGNOSIS — E08.65 DIABETES MELLITUS DUE TO UNDERLYING CONDITION WITH HYPERGLYCEMIA, WITH LONG-TERM CURRENT USE OF INSULIN: ICD-10-CM

## 2025-05-21 NOTE — PROGRESS NOTES
Care Management Monthly Outreach  Chart review completed  Confirmation of at least 2 patient identifiers  Change in insurance? No    Has patient been to ER/Urgent Care since last outreach? No    Last Office Visit with PCP: 5/13/2025   Next Office Visit with PCP: Visit date not found   APC Collaboration: n/a    Chronic Conditions and Outreach Summary:   Essential hypertension    Diabetes mellitus due to underlying condition with hyperglycemia, with long-term current use of insulin    Patient reports that his left hip is really hurting him.  He plans to give Dr. Colindres's office a call for appt.  He was in to see ortho on 5/13/25.  At that time his knees were bothering him.  He reports that he and his wife did a lot of flower planting yesterday.  He is using heating pad which offers temporary relief.  Recommended ice pack in intervals to decrease inflammation.  Patient states that he is sitting on a stool eating breakfast and experiencing slight pain.      Medications:   Are there medication changes since last visit? No  Refills needed? No    Social Drivers of Health: Deferred  Care Gaps Addressed? Deferred  Care Plan addressed: No    Upcoming Appointments:   Future Appointments       Date / Time Provider Department Dept Phone    6/2/2025 1:00 PM (Arrive by 12:45 PM) Miki Bowser MD [facility]  [phone]    6/2/2025 1:30 PM INF 8A [facility]  [phone]    7/24/2025 10:00 AM Wilfrido Goodwin MD [facility] [phone]    7/30/2025 10:40 AM Siddhartha Burnett MD [facility]  [phone]    8/13/2025 1:15 PM (Arrive by 1:00 PM) Ananda Colindres MD [facility] [phone]    9/8/2025 9:30 AM Jimmy Helm MD  [facility] [phone]          Blood Pressures Reviewed  BP Readings from Last 3 Encounters:   05/13/25 103/62   05/12/25 119/64   05/05/25 101/63     Labs Reviewed:  Lab Results   Component Value Date     CREATININE 2.11 (H) 05/12/2025    GLUCOSE 166 (H) 05/12/2025    ALKPHOS 72 05/12/2025    K 4.5 05/12/2025    PROT 7.2 05/12/2025     05/12/2025    CALCIUM 9.4 05/12/2025    AST 10 05/12/2025    ALT <3 (L) 05/12/2025    BUN 52 (H) 05/12/2025    MG 1.50 (L) 08/22/2022    PHOS 4.0 06/30/2023    GFRMALE 51 (A) 09/11/2023     Lab Results   Component Value Date    TRIG 82 05/06/2025    CHOL 84 05/06/2025    LDLCALC 31 05/06/2025    HDL 37 (L) 05/06/2025     Lab Results   Component Value Date    HGBA1C 7.9 (H) 05/06/2025    HGBA1C 7.2 (H) 11/04/2024    HGBA1C 7.1 (H) 08/21/2024     Lab Results   Component Value Date    WBC 7.3 05/12/2025    RBC 2.84 (L) 05/12/2025    HGB 10.8 (L) 05/12/2025     05/12/2025   No other concerns at this time.  Agreeable to continue monthly outreaches.  Encouraged to call if questions or concerns arise.    Agnes Tierney RN

## 2025-05-30 ENCOUNTER — PATIENT MESSAGE (OUTPATIENT)
Dept: PRIMARY CARE | Facility: CLINIC | Age: 80
End: 2025-05-30
Payer: MEDICARE

## 2025-05-30 DIAGNOSIS — I10 ESSENTIAL HYPERTENSION: ICD-10-CM

## 2025-05-30 RX ORDER — HYDROCHLOROTHIAZIDE 25 MG/1
25 TABLET ORAL DAILY
Qty: 90 TABLET | Refills: 3 | Status: SHIPPED | OUTPATIENT
Start: 2025-05-30

## 2025-06-02 ENCOUNTER — LAB (OUTPATIENT)
Dept: LAB | Facility: CLINIC | Age: 80
End: 2025-06-02
Payer: MEDICARE

## 2025-06-02 ENCOUNTER — OFFICE VISIT (OUTPATIENT)
Dept: HEMATOLOGY/ONCOLOGY | Facility: CLINIC | Age: 80
End: 2025-06-02
Payer: MEDICARE

## 2025-06-02 ENCOUNTER — INFUSION (OUTPATIENT)
Dept: HEMATOLOGY/ONCOLOGY | Facility: CLINIC | Age: 80
End: 2025-06-02
Payer: MEDICARE

## 2025-06-02 VITALS
SYSTOLIC BLOOD PRESSURE: 98 MMHG | RESPIRATION RATE: 16 BRPM | DIASTOLIC BLOOD PRESSURE: 51 MMHG | TEMPERATURE: 97.2 F | HEART RATE: 92 BPM | BODY MASS INDEX: 27.83 KG/M2 | OXYGEN SATURATION: 93 % | WEIGHT: 172.4 LBS

## 2025-06-02 DIAGNOSIS — D46.1 REFRACTORY ANEMIA WITH RING SIDEROBLASTS: ICD-10-CM

## 2025-06-02 DIAGNOSIS — D46.Z MDS (MYELODYSPLASTIC SYNDROME), LOW GRADE (MULTI): ICD-10-CM

## 2025-06-02 DIAGNOSIS — E08.65 DIABETES MELLITUS DUE TO UNDERLYING CONDITION WITH HYPERGLYCEMIA, WITH LONG-TERM CURRENT USE OF INSULIN: ICD-10-CM

## 2025-06-02 DIAGNOSIS — E83.110 HEREDITARY HEMOCHROMATOSIS: ICD-10-CM

## 2025-06-02 DIAGNOSIS — I73.9 PVD (PERIPHERAL VASCULAR DISEASE): ICD-10-CM

## 2025-06-02 DIAGNOSIS — D46.Z MDS (MYELODYSPLASTIC SYNDROME), LOW GRADE (MULTI): Primary | ICD-10-CM

## 2025-06-02 DIAGNOSIS — I10 ESSENTIAL HYPERTENSION: ICD-10-CM

## 2025-06-02 DIAGNOSIS — E78.2 MIXED HYPERLIPIDEMIA: ICD-10-CM

## 2025-06-02 DIAGNOSIS — Z79.4 DIABETES MELLITUS DUE TO UNDERLYING CONDITION WITH HYPERGLYCEMIA, WITH LONG-TERM CURRENT USE OF INSULIN: ICD-10-CM

## 2025-06-02 LAB
ALBUMIN SERPL BCP-MCNC: 4.6 G/DL (ref 3.4–5)
ALP SERPL-CCNC: 72 U/L (ref 33–136)
ALT SERPL W P-5'-P-CCNC: 5 U/L (ref 10–52)
ANION GAP SERPL CALC-SCNC: 16 MMOL/L (ref 10–20)
AST SERPL W P-5'-P-CCNC: 10 U/L (ref 9–39)
BASOPHILS # BLD AUTO: 0.09 X10*3/UL (ref 0–0.1)
BASOPHILS NFR BLD AUTO: 1.5 %
BILIRUB SERPL-MCNC: 1.7 MG/DL (ref 0–1.2)
BUN SERPL-MCNC: 37 MG/DL (ref 6–23)
CALCIUM SERPL-MCNC: 9.5 MG/DL (ref 8.6–10.3)
CHLORIDE SERPL-SCNC: 101 MMOL/L (ref 98–107)
CO2 SERPL-SCNC: 26 MMOL/L (ref 21–32)
CREAT SERPL-MCNC: 1.85 MG/DL (ref 0.5–1.3)
EGFRCR SERPLBLD CKD-EPI 2021: 36 ML/MIN/1.73M*2
EOSINOPHIL # BLD AUTO: 0.34 X10*3/UL (ref 0–0.4)
EOSINOPHIL NFR BLD AUTO: 5.8 %
ERYTHROCYTE [DISTWIDTH] IN BLOOD BY AUTOMATED COUNT: 22.3 % (ref 11.5–14.5)
GLUCOSE SERPL-MCNC: 139 MG/DL (ref 74–99)
HCT VFR BLD AUTO: 31.1 % (ref 41–52)
HGB BLD-MCNC: 10.4 G/DL (ref 13.5–17.5)
IMM GRANULOCYTES # BLD AUTO: 0.01 X10*3/UL (ref 0–0.5)
IMM GRANULOCYTES NFR BLD AUTO: 0.2 % (ref 0–0.9)
LYMPHOCYTES # BLD AUTO: 1.57 X10*3/UL (ref 0.8–3)
LYMPHOCYTES NFR BLD AUTO: 26.7 %
MCH RBC QN AUTO: 38.1 PG (ref 26–34)
MCHC RBC AUTO-ENTMCNC: 33.4 G/DL (ref 32–36)
MCV RBC AUTO: 114 FL (ref 80–100)
MONOCYTES # BLD AUTO: 0.61 X10*3/UL (ref 0.05–0.8)
MONOCYTES NFR BLD AUTO: 10.4 %
NEUTROPHILS # BLD AUTO: 3.25 X10*3/UL (ref 1.6–5.5)
NEUTROPHILS NFR BLD AUTO: 55.4 %
PLATELET # BLD AUTO: 163 X10*3/UL (ref 150–450)
POTASSIUM SERPL-SCNC: 4.6 MMOL/L (ref 3.5–5.3)
PROT SERPL-MCNC: 7 G/DL (ref 6.4–8.2)
RBC # BLD AUTO: 2.73 X10*6/UL (ref 4.5–5.9)
SODIUM SERPL-SCNC: 138 MMOL/L (ref 136–145)
WBC # BLD AUTO: 5.9 X10*3/UL (ref 4.4–11.3)

## 2025-06-02 PROCEDURE — 99214 OFFICE O/P EST MOD 30 MIN: CPT | Performed by: INTERNAL MEDICINE

## 2025-06-02 PROCEDURE — 96372 THER/PROPH/DIAG INJ SC/IM: CPT

## 2025-06-02 PROCEDURE — 1126F AMNT PAIN NOTED NONE PRSNT: CPT | Performed by: INTERNAL MEDICINE

## 2025-06-02 PROCEDURE — 36415 COLL VENOUS BLD VENIPUNCTURE: CPT

## 2025-06-02 PROCEDURE — 1159F MED LIST DOCD IN RCRD: CPT | Performed by: INTERNAL MEDICINE

## 2025-06-02 PROCEDURE — 3078F DIAST BP <80 MM HG: CPT | Performed by: INTERNAL MEDICINE

## 2025-06-02 PROCEDURE — 85025 COMPLETE CBC W/AUTO DIFF WBC: CPT

## 2025-06-02 PROCEDURE — 2500000004 HC RX 250 GENERAL PHARMACY W/ HCPCS (ALT 636 FOR OP/ED): Mod: JW,TB | Performed by: INTERNAL MEDICINE

## 2025-06-02 PROCEDURE — 1160F RVW MEDS BY RX/DR IN RCRD: CPT | Performed by: INTERNAL MEDICINE

## 2025-06-02 PROCEDURE — 99214 OFFICE O/P EST MOD 30 MIN: CPT | Mod: 25 | Performed by: INTERNAL MEDICINE

## 2025-06-02 PROCEDURE — 80053 COMPREHEN METABOLIC PANEL: CPT

## 2025-06-02 PROCEDURE — G2211 COMPLEX E/M VISIT ADD ON: HCPCS | Performed by: INTERNAL MEDICINE

## 2025-06-02 PROCEDURE — 3074F SYST BP LT 130 MM HG: CPT | Performed by: INTERNAL MEDICINE

## 2025-06-02 RX ORDER — EPINEPHRINE 0.3 MG/.3ML
0.3 INJECTION SUBCUTANEOUS EVERY 5 MIN PRN
OUTPATIENT
Start: 2025-06-23

## 2025-06-02 RX ORDER — EPINEPHRINE 0.3 MG/.3ML
0.3 INJECTION SUBCUTANEOUS EVERY 5 MIN PRN
Status: DISCONTINUED | OUTPATIENT
Start: 2025-06-02 | End: 2025-06-02 | Stop reason: HOSPADM

## 2025-06-02 RX ORDER — ALBUTEROL SULFATE 0.83 MG/ML
3 SOLUTION RESPIRATORY (INHALATION) AS NEEDED
OUTPATIENT
Start: 2025-06-23

## 2025-06-02 RX ORDER — ALBUTEROL SULFATE 0.83 MG/ML
3 SOLUTION RESPIRATORY (INHALATION) AS NEEDED
Status: DISCONTINUED | OUTPATIENT
Start: 2025-06-02 | End: 2025-06-02 | Stop reason: HOSPADM

## 2025-06-02 RX ORDER — DIPHENHYDRAMINE HYDROCHLORIDE 50 MG/ML
50 INJECTION, SOLUTION INTRAMUSCULAR; INTRAVENOUS AS NEEDED
Status: DISCONTINUED | OUTPATIENT
Start: 2025-06-02 | End: 2025-06-02 | Stop reason: HOSPADM

## 2025-06-02 RX ORDER — DIPHENHYDRAMINE HYDROCHLORIDE 50 MG/ML
50 INJECTION, SOLUTION INTRAMUSCULAR; INTRAVENOUS AS NEEDED
OUTPATIENT
Start: 2025-06-23

## 2025-06-02 RX ORDER — FAMOTIDINE 10 MG/ML
20 INJECTION, SOLUTION INTRAVENOUS ONCE AS NEEDED
OUTPATIENT
Start: 2025-06-23

## 2025-06-02 RX ORDER — FAMOTIDINE 10 MG/ML
20 INJECTION, SOLUTION INTRAVENOUS ONCE AS NEEDED
Status: DISCONTINUED | OUTPATIENT
Start: 2025-06-02 | End: 2025-06-02 | Stop reason: HOSPADM

## 2025-06-02 RX ADMIN — LUSPATERCEPT 137.5 MG: 75 INJECTION, POWDER, LYOPHILIZED, FOR SOLUTION SUBCUTANEOUS at 14:24

## 2025-06-02 ASSESSMENT — PAIN SCALES - GENERAL: PAINLEVEL_OUTOF10: 0-NO PAIN

## 2025-06-02 NOTE — PROGRESS NOTES
Patient ID: Chas Melgar is a 80 y.o. male.  Referring Physician: Miki Bowser MD  [id number]  [place] [address]  Primary Care Provider: Jimmy Helm MD  Visit Type: Follow Up      Subjective    HPI How are my blood counts?    Review of Systems   Constitutional:  Positive for fatigue.   HENT:  Negative.     Eyes: Negative.    Respiratory: Negative.     Cardiovascular: Negative.    Gastrointestinal: Negative.    Endocrine: Negative.    Genitourinary: Negative.     Musculoskeletal:  Positive for arthralgias.   Skin: Negative.    Neurological: Negative.    Hematological: Negative.    Psychiatric/Behavioral: Negative.          Objective   BSA: 1.91 meters squared  BP 98/51 (BP Location: Right arm, Patient Position: Sitting, BP Cuff Size: Large adult)   Pulse 92   Temp 36.2 °C (97.2 °F) (Temporal)   Resp 16   Wt 78.2 kg (172 lb 6.4 oz)   SpO2 93%   BMI 27.83 kg/m²      has a past medical history of Acute gastric ulcer with hemorrhage (09/07/2022), Acute upper respiratory infection, unspecified, Arthritis, Body mass index (BMI) 28.0-28.9, adult (10/19/2021), Cancer (Multi), Coronary artery disease, Diabetes mellitus (Multi), Encounter for other preprocedural examination (10/14/2021), Encounter for screening for malignant neoplasm of prostate, Impingement syndrome of unspecified shoulder (07/22/2021), Other abnormalities of breathing, 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), Other specified follicular disorders, Other specified postprocedural states (06/09/2021), Overweight (02/21/2022), Overweight (01/18/2022), Pain in right hip, Pain in right leg, Pain in unspecified hip (08/04/2021), Pain in unspecified shoulder (08/10/2021), Personal history of malignant neoplasm, unspecified (08/04/2021), Personal history of other diseases of male genital organs, Personal history of other diseases of the circulatory system (08/04/2021), Personal history of other  diseases of the circulatory system (08/04/2021), Personal history of other diseases of the digestive system (10/14/2021), Personal history of other diseases of the digestive system (02/26/2022), Personal history of other diseases of the digestive system, Personal history of other diseases of the musculoskeletal system and connective tissue (08/04/2021), Personal history of other diseases of the musculoskeletal system and connective tissue (08/05/2021), Personal history of other diseases of the respiratory system (11/17/2021), Personal history of other diseases of the respiratory system, Personal history of other diseases of the respiratory system, Personal history of other diseases of the respiratory system, Personal history of other diseases of urinary system (09/01/2022), Personal history of other endocrine, nutritional and metabolic disease (10/06/2021), Personal history of other endocrine, nutritional and metabolic disease (01/18/2022), Personal history of other malignant neoplasm of skin, Personal history of other specified conditions (09/01/2022), Personal history of other specified conditions, Personal history of other specified conditions, Persons encountering health services in other specified circumstances, Primary osteoarthritis, 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   has a past surgical history that includes Other surgical history (11/07/2022); Other surgical history (10/14/2021); Other surgical history (10/14/2021); Other surgical history (10/14/2021); Other surgical history (10/14/2021); Other surgical history (10/14/2021); Other surgical history (11/17/2021); CT angio aorta and bilateral iliofemoral runoff including without contrast if performed  (10/27/2020); and Rotator cuff repair.  Family History[1]  Oncology History    No history exists.       Chas Melgar  reports that he quit smoking about 39 years ago. His smoking use included cigarettes. He started smoking about 59 years ago. He has a 40 pack-year smoking history. He has never used smokeless tobacco.  He  reports current alcohol use of about 2.0 standard drinks of alcohol per week.  He  reports no history of drug use.    Physical Exam  Vitals reviewed.   Constitutional:       Appearance: Normal appearance.      Comments: Arrives in a wheelchair   HENT:      Head: Normocephalic.      Mouth/Throat:      Mouth: Mucous membranes are moist.   Eyes:      Extraocular Movements: Extraocular movements intact.      Pupils: Pupils are equal, round, and reactive to light.   Cardiovascular:      Rate and Rhythm: Normal rate and regular rhythm.      Pulses: Normal pulses.      Heart sounds: Normal heart sounds.   Pulmonary:      Effort: Pulmonary effort is normal.      Breath sounds: Normal breath sounds.   Abdominal:      General: Bowel sounds are normal.      Palpations: Abdomen is soft.   Musculoskeletal:         General: Normal range of motion.      Cervical back: Normal range of motion and neck supple.   Skin:     General: Skin is warm.   Neurological:      General: No focal deficit present.      Mental Status: He is alert and oriented to person, place, and time.   Psychiatric:         Mood and Affect: Mood normal.         Behavior: Behavior normal.         WBC   Date/Time Value Ref Range Status   06/02/2025 10:57 AM 5.9 4.4 - 11.3 x10*3/uL Final   05/12/2025 10:25 AM 7.3 4.4 - 11.3 x10*3/uL Final   04/21/2025 11:25 AM 8.0 4.4 - 11.3 x10*3/uL Final     nRBC   Date Value Ref Range Status   05/12/2025   Final     Comment:     Not Measured   04/21/2025   Final     Comment:     Not Measured   03/31/2025   Final     Comment:     Not Measured     RBC   Date Value Ref Range Status   06/02/2025 2.73 (L) 4.50  - 5.90 x10*6/uL Final   05/12/2025 2.84 (L) 4.50 - 5.90 x10*6/uL Final   04/21/2025 2.98 (L) 4.50 - 5.90 x10*6/uL Final     Hemoglobin   Date Value Ref Range Status   06/02/2025 10.4 (L) 13.5 - 17.5 g/dL Final   05/12/2025 10.8 (L) 13.5 - 17.5 g/dL Final   04/21/2025 11.2 (L) 13.5 - 17.5 g/dL Final     Hematocrit   Date Value Ref Range Status   06/02/2025 31.1 (L) 41.0 - 52.0 % Final   05/12/2025 31.9 (L) 41.0 - 52.0 % Final   04/21/2025 34.4 (L) 41.0 - 52.0 % Final     MCV   Date/Time Value Ref Range Status   06/02/2025 10:57  (H) 80 - 100 fL Final   05/12/2025 10:25  (H) 80 - 100 fL Final   04/21/2025 11:25  (H) 80 - 100 fL Final     MCH   Date/Time Value Ref Range Status   06/02/2025 10:57 AM 38.1 (H) 26.0 - 34.0 pg Final   05/12/2025 10:25 AM 38.0 (H) 26.0 - 34.0 pg Final   04/21/2025 11:25 AM 37.6 (H) 26.0 - 34.0 pg Final     MCHC   Date/Time Value Ref Range Status   06/02/2025 10:57 AM 33.4 32.0 - 36.0 g/dL Final   05/12/2025 10:25 AM 33.9 32.0 - 36.0 g/dL Final   04/21/2025 11:25 AM 32.6 32.0 - 36.0 g/dL Final     RDW   Date/Time Value Ref Range Status   06/02/2025 10:57 AM 22.3 (H) 11.5 - 14.5 % Final   05/12/2025 10:25 AM 21.5 (H) 11.5 - 14.5 % Final   04/21/2025 11:25 AM 22.6 (H) 11.5 - 14.5 % Final     Platelets   Date/Time Value Ref Range Status   06/02/2025 10:57  150 - 450 x10*3/uL Final   05/12/2025 10:25  150 - 450 x10*3/uL Final   04/21/2025 11:25  150 - 450 x10*3/uL Final     MPV   Date/Time Value Ref Range Status   10/23/2023 01:06 PM 14.6 (H) 7.5 - 11.5 fL Final   10/02/2023 12:34 PM 14.5 (H) 7.5 - 11.5 fL Final     Neutrophils %   Date/Time Value Ref Range Status   06/02/2025 10:57 AM 55.4 40.0 - 80.0 % Final   05/12/2025 10:25 AM 60.5 40.0 - 80.0 % Final   04/21/2025 11:25 AM 55.1 40.0 - 80.0 % Final     Immature Granulocytes %, Automated   Date/Time Value Ref Range Status   06/02/2025 10:57 AM 0.2 0.0 - 0.9 % Final     Comment:     Immature Granulocyte Count  (IG) includes promyelocytes, myelocytes and metamyelocytes but does not include bands. Percent differential counts (%) should be interpreted in the context of the absolute cell counts (cells/UL).   05/12/2025 10:25 AM 0.4 0.0 - 0.9 % Final     Comment:     Immature Granulocyte Count (IG) includes promyelocytes, myelocytes and metamyelocytes but does not include bands. Percent differential counts (%) should be interpreted in the context of the absolute cell counts (cells/UL).   04/21/2025 11:25 AM 0.6 0.0 - 0.9 % Final     Comment:     Immature Granulocyte Count (IG) includes promyelocytes, myelocytes and metamyelocytes but does not include bands. Percent differential counts (%) should be interpreted in the context of the absolute cell counts (cells/UL).     Lymphocytes %, Manual   Date/Time Value Ref Range Status   02/17/2025 09:31 AM 16.0 13.0 - 44.0 % Final     Lymphocytes %   Date/Time Value Ref Range Status   06/02/2025 10:57 AM 26.7 13.0 - 44.0 % Final   05/12/2025 10:25 AM 19.5 13.0 - 44.0 % Final   04/21/2025 11:25 AM 26.0 13.0 - 44.0 % Final     Monocytes %, Manual   Date/Time Value Ref Range Status   02/17/2025 09:31 AM 4.0 2.0 - 10.0 % Final     Monocytes %   Date/Time Value Ref Range Status   06/02/2025 10:57 AM 10.4 2.0 - 10.0 % Final   05/12/2025 10:25 AM 11.3 2.0 - 10.0 % Final   04/21/2025 11:25 AM 10.7 2.0 - 10.0 % Final     Eosinophils %, Manual   Date/Time Value Ref Range Status   02/17/2025 09:31 AM 7.0 0.0 - 6.0 % Final     Eosinophils %   Date/Time Value Ref Range Status   06/02/2025 10:57 AM 5.8 0.0 - 6.0 % Final   05/12/2025 10:25 AM 6.8 0.0 - 6.0 % Final   04/21/2025 11:25 AM 5.7 0.0 - 6.0 % Final     Basophils %, Manual   Date/Time Value Ref Range Status   02/17/2025 09:31 AM 1.0 0.0 - 2.0 % Final     Basophils %   Date/Time Value Ref Range Status   06/02/2025 10:57 AM 1.5 0.0 - 2.0 % Final   05/12/2025 10:25 AM 1.5 0.0 - 2.0 % Final   04/21/2025 11:25 AM 1.9 0.0 - 2.0 % Final      Neutrophils Absolute   Date/Time Value Ref Range Status   06/02/2025 10:57 AM 3.25 1.60 - 5.50 x10*3/uL Final     Comment:     Percent differential counts (%) should be interpreted in the context of the absolute cell counts (cells/uL).   05/12/2025 10:25 AM 4.43 1.60 - 5.50 x10*3/uL Final     Comment:     Percent differential counts (%) should be interpreted in the context of the absolute cell counts (cells/uL).   04/21/2025 11:25 AM 4.41 1.60 - 5.50 x10*3/uL Final     Comment:     Percent differential counts (%) should be interpreted in the context of the absolute cell counts (cells/uL).     Immature Granulocytes Absolute, Automated   Date/Time Value Ref Range Status   06/02/2025 10:57 AM 0.01 0.00 - 0.50 x10*3/uL Final   05/12/2025 10:25 AM 0.03 0.00 - 0.50 x10*3/uL Final   04/21/2025 11:25 AM 0.05 0.00 - 0.50 x10*3/uL Final     Lymphocytes Absolute   Date/Time Value Ref Range Status   06/02/2025 10:57 AM 1.57 0.80 - 3.00 x10*3/uL Final   05/12/2025 10:25 AM 1.43 0.80 - 3.00 x10*3/uL Final   04/21/2025 11:25 AM 2.08 0.80 - 3.00 x10*3/uL Final     Monocytes Absolute   Date/Time Value Ref Range Status   06/02/2025 10:57 AM 0.61 0.05 - 0.80 x10*3/uL Final   05/12/2025 10:25 AM 0.83 (H) 0.05 - 0.80 x10*3/uL Final   04/21/2025 11:25 AM 0.86 (H) 0.05 - 0.80 x10*3/uL Final     Eosinophils Absolute   Date/Time Value Ref Range Status   06/02/2025 10:57 AM 0.34 0.00 - 0.40 x10*3/uL Final   05/12/2025 10:25 AM 0.50 (H) 0.00 - 0.40 x10*3/uL Final   04/21/2025 11:25 AM 0.46 (H) 0.00 - 0.40 x10*3/uL Final     Eosinophils Absolute, Manual   Date/Time Value Ref Range Status   02/17/2025 09:31 AM 0.60 (H) 0.00 - 0.40 x10*3/uL Final     Basophils Absolute   Date/Time Value Ref Range Status   06/02/2025 10:57 AM 0.09 0.00 - 0.10 x10*3/uL Final   05/12/2025 10:25 AM 0.11 (H) 0.00 - 0.10 x10*3/uL Final   04/21/2025 11:25 AM 0.15 (H) 0.00 - 0.10 x10*3/uL Final     Comment:     Automated WBC differential has been confirmed by manual  "smear.     Basophils Absolute, Manual   Date/Time Value Ref Range Status   02/17/2025 09:31 AM 0.09 0.00 - 0.10 x10*3/uL Final       No components found for: \"PT\"  aPTT   Date/Time Value Ref Range Status   11/21/2023 08:20 AM 31 27 - 38 seconds Final   08/22/2022 01:05 PM 30 26 - 39 sec Final     Comment:       THE APTT IS NO LONGER USED FOR MONITORING     UNFRACTIONATED HEPARIN THERAPY.    FOR MONITORING HEPARIN THERAPY,     USE THE HEPARIN ASSAY.     08/21/2021 07:08 AM 24 (L) 25 - 35 sec Final     Comment:       THE APTT IS NO LONGER USED FOR MONITORING     UNFRACTIONATED HEPARIN THERAPY.    FOR MONITORING HEPARIN THERAPY,     USE THE HEPARIN ASSAY.     04/29/2021 08:43 AM 28 25 - 35 sec Final     Comment:       THE APTT IS NO LONGER USED FOR MONITORING     UNFRACTIONATED HEPARIN THERAPY.    FOR MONITORING HEPARIN THERAPY,     USE THE HEPARIN ASSAY.       Medication Documentation Review Audit       Reviewed by Justin Hernandez MA (Medical Assistant) on 06/02/25 at 1309      Medication Order Taking? Sig Documenting Provider Last Dose Status   alfuzosin (Uroxatral) 10 mg 24 hr tablet [id number] Yes Take 1 tablet (10 mg) by mouth early in the morning.. Historical Provider, MD  Active   aspirin 81 mg EC tablet [id number] Yes Take 1 tablet (81 mg) by mouth once daily. Historical Provider, MD  Active   BD Ultra-Fine Short Pen Needle 31 gauge x 5/16\" needle [id number] Yes USE ONCE DAILY Jimmy Helm MD  Active   calcitriol (Rocaltrol) 0.25 mcg capsule [id number] Yes TAKE 1 CAPSULE BY MOUTH EVERY other day Siddhartha Burnett MD  Active   carbidopa-levodopa (Sinemet)  mg tablet [id number] Yes Take by mouth. Historical Provider, MD  Active   cyanocobalamin (Vitamin B-12) 1,000 mcg tablet [id number] Yes Take 2 tablets (2,000 mcg) by mouth once daily. Historical Provider, MD  Active   dapagliflozin propanediol (Farxiga) 10 mg [id number] Yes Take 1 tablet (10 mg) by mouth once daily in the morning. Take before meals. Wilfrido Z " MD Buddy  Active   dilTIAZem CD (Cardizem CD) 240 mg 24 hr capsule [id number] Yes TAKE 1 CAPSULE BY MOUTH DAILY Wilfrido Goodwin MD  Active   finasteride (Proscar) 5 mg tablet [id number]  Take 1 tablet (5 mg) by mouth early in the morning.. Historical Provider, MD  Active   folic acid 0.8 mg capsule [id number] Yes Take 1 tablet by mouth once daily. Historical Provider, MD  Active   gabapentin (Neurontin) 100 mg capsule [id number] Yes Take 2 capsules (200 mg) by mouth 2 times a day. Jimmy Helm MD  Active   glimepiride (Amaryl) 4 mg tablet [id number] Yes Take 1 tablet (4 mg) by mouth 2 times a day. Jimmy Helm MD  Active     Discontinued 25   hydroCHLOROthiazide (HYDRODiuril) 25 mg tablet [id number] Yes TAKE 1 TABLET BY MOUTH ONCE DAILY Jimmy Helm MD  Active   insulin glargine (Lantus Solostar U-100 Insulin) 100 unit/mL (3 mL) pen [id number] Yes Inject 30 Units under the skin once daily at bedtime. Jimmy Helm MD  Active   isosorbide mononitrate ER (Imdur) 30 mg 24 hr tablet [id number] Yes TAKE 1 TABLET BY MOUTH DAILY Wilfrido Goodwin MD  Active   lisinopril 10 mg tablet [id number] Yes Take 1 tablet (10 mg) by mouth once daily. Jimmy Helm MD  Active   nitroglycerin (Nitrostat) 0.4 mg SL tablet [id number]  Place 1 tablet (0.4 mg) under the tongue every 5 minutes if needed for chest pain. Wilfrido Goodwin MD   25 2350   pantoprazole (ProtoNix) 40 mg EC tablet [id number] Yes TAKE 1 TABLET BY MOUTH ONCE DAILY Dixon Murray MD  Active   simvastatin (Zocor) 20 mg tablet [id number] Yes Take 1 tablet (20 mg) by mouth once daily at bedtime. Wilfrido Goodwin MD  Active   tamsulosin (Flomax) 0.4 mg 24 hr capsule [id number] Yes Take 1 capsule (0.4 mg) by mouth once daily. Historical Provider, MD  Active                   Assessment/Plan    1) MDS/RARS  -has symptomatic anemia  -failed aranesp  -currently on luspatercept Q3 weeks  -currently being dosed at 1.75 mg/kg  -energy levels have picked  up  -however still dealing with pain in his left leg with sciatica like symptoms--he closed his car door on his left leg by accident; has been following with orthopedist  -labs to be done today include CBC, COMP  -results reviewed--wbc 5.9, hgb 10.4, plt 163,000, creatinine 1.85, calcium 9.5, alk phos 72, AST 10, total bili 1.7, ALT 5  -benefits, risks, potential morbidity related to luspatercept were reviewed with Chas and he provided informed consent to prceed  -he went on to receive luspatercept 1.75 mg/kg subcutaneous  -he knows that he will not receive a dose if and when his hgb is >=11.5  -he will continue to return Q21 days     2) hereditary hemochromatosis  -had been on therapeutic phlebotomy in the past, however, given current marked anemia, it has not been wise to continue with phlebotomy     3) hyperlipidemia  -on simvastatin     4) hypertension  -on hydrochlorothiazide  -on lisinopril  -on Cartia XT     5) diabetes  -on glimepiride  -on metformin  -on levemir     6) PAD  -on ASA  -s/p multiple stents     Problem List Items Addressed This Visit           ICD-10-CM    MDS (myelodysplastic syndrome), low grade (Multi) D46.Z    Relevant Orders    Infusion Appointment Request [facility] INFUSION    Infusion Appointment Request [facility] INFUSION    Infusion Appointment Request [facility] INFUSION    Clinic Appointment Request Chemo Follow Up; MIKI BOWSER; [facility]    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    Relevant Orders    Infusion Appointment Request [facility]    Infusion Appointment Request [facility]    Infusion Appointment Request [facility] INFUSION    Clinic Appointment Request Chemo Follow Up; MIKI BOWSER; [facility]            Miki Bowser MD                                [1]   Family History  Problem Relation Name Age of Onset    Diabetes Mother Melinda andino     Coronary artery disease Father      Hypertension Other       Alzheimer's disease Other

## 2025-06-02 NOTE — PATIENT INSTRUCTIONS
Hgb-10.4. Pt here for luspatercept. Tolerated injection without incident. RTC in 3 weeks for next injection.

## 2025-06-03 ENCOUNTER — OFFICE VISIT (OUTPATIENT)
Dept: ORTHOPEDIC SURGERY | Facility: CLINIC | Age: 80
End: 2025-06-03
Payer: MEDICARE

## 2025-06-03 ENCOUNTER — HOSPITAL ENCOUNTER (OUTPATIENT)
Dept: RADIOLOGY | Facility: CLINIC | Age: 80
Discharge: HOME | End: 2025-06-03
Payer: MEDICARE

## 2025-06-03 DIAGNOSIS — M25.561 ACUTE PAIN OF RIGHT KNEE: ICD-10-CM

## 2025-06-03 DIAGNOSIS — M17.0 PRIMARY OSTEOARTHRITIS OF BOTH KNEES: Primary | ICD-10-CM

## 2025-06-03 PROCEDURE — 99213 OFFICE O/P EST LOW 20 MIN: CPT | Performed by: ORTHOPAEDIC SURGERY

## 2025-06-03 PROCEDURE — 99212 OFFICE O/P EST SF 10 MIN: CPT | Performed by: ORTHOPAEDIC SURGERY

## 2025-06-03 PROCEDURE — 2500000004 HC RX 250 GENERAL PHARMACY W/ HCPCS (ALT 636 FOR OP/ED): Performed by: ORTHOPAEDIC SURGERY

## 2025-06-03 PROCEDURE — 73564 X-RAY EXAM KNEE 4 OR MORE: CPT | Mod: RT

## 2025-06-03 PROCEDURE — 20610 DRAIN/INJ JOINT/BURSA W/O US: CPT | Mod: LT | Performed by: ORTHOPAEDIC SURGERY

## 2025-06-03 PROCEDURE — 73564 X-RAY EXAM KNEE 4 OR MORE: CPT | Mod: RIGHT SIDE | Performed by: ORTHOPAEDIC SURGERY

## 2025-06-03 RX ADMIN — LIDOCAINE HYDROCHLORIDE 4 ML: 10 INJECTION, SOLUTION INFILTRATION; PERINEURAL at 11:56

## 2025-06-03 RX ADMIN — BETAMETHASONE ACETATE AND BETAMETHASONE SODIUM PHOSPHATE 2 ML: 3; 3 INJECTION, SUSPENSION INTRA-ARTICULAR; INTRALESIONAL; INTRAMUSCULAR; SOFT TISSUE at 11:56

## 2025-06-03 NOTE — PROGRESS NOTES
History of present illness    80-year-old gentleman I been following for knee arthritis on his left side he states his right knee started to give him more trouble he is having popping clicking and grinding in the right knee no new injury he knows he has significant right knee arthritis.  He states that the gel injection he had on the left side helped but he had an injury to his left side that seems to have aggravated things both knees are giving him trouble he is having significant impairment of bodily function.      Past medical , Surgical, Family and social history reviewed.      Physical exam  General: No acute distress and breathing comfortably.  Patient is pleasant and cooperative with the examination.    Extremity  Right knee has crepitus with range of motion tender medial joint space no instability brisk cap refill compartment soft calf is nontender    Diagnostics      Imaging  No results found.    Cardiology, Vascular, and Other Imaging  XR knee right 4+ views  Result Date: 6/3/2025  Interpreted By:  Blanka Colindres, STUDY: XR KNEE RIGHT 4+ VIEWS; 6/3/2025 11:36 am   INDICATION: Signs/Symptoms:pain.   ACCESSION NUMBER(S): [id number]   ORDERING CLINICIAN: BLANKA COLINDRES   FINDINGS: Right knee weightbearing four views. Severe knee arthritis is varus deformity bone-on-bone articulation medial joint space large osteophyte formation no signs of fracture dislocation or other bony abnormality     Signed by: Blanka Colindres 6/3/2025 12:24 PM Dictation workstation:   [id number]         Procedure  See dictated procedure note    Assessment  Bilateral knee arthritis    Treatment plan  1.  The natural history of the condition and its associated treatment alternatives including surgical and nonsurgical options were discussed with the patient at length.  2.  After thoughtful consideration he like to proceed with cortisone injection left knee which is performed today with his consent without  complication.  Patient understands the cortisone may provide temporary relief how much it helps her how long it lasts is unpredictable.  Cortisone can be repeated after 3 months if symptoms return.  He is also interested in trying viscosupplementation in his right knee when a good submit for approval.  3.  In conclusion, this patient has osteoarthritis of the knee which is symptomatic.  This is causing significant morning stiffness lasting over an hour.  The patient has popping clicking and grinding in the knee with range of motion that is decreased and gets worse with prolonged standing or going up and down stairs.  This affects functional activities and activities of daily living.  There is radiographic evidence of osteoarthritis with joint space narrowing and marginal osteophyte formation.  This patient has also failed nonpharmacologic treatment for osteoarthritis including attempts at weight loss, and a home exercise program with or without physical therapy.  The patient has also failed pharmacologic treatments for osteoarthritis including over-the-counter analgesics, anti-inflammatory medication as well as injectable treatments.  For these reasons I feel the patient is a candidate for Visco supplementation injections of the knee.  4.  All of the patient's questions were answered.    Orders Placed This Encounter    Inj/Asp: Left knee    XR knee right 4+ views       This note was prepared using voice recognition software.  The details of this note are correct and have been reviewed, and corrected to the best of my ability.  Some grammatical areas may persist related to the Dragon software    Ananda Colindres MD  Senior Attending Physician  [facility]  Orthopedic [place]    [phone]    Inj/Asp: Left knee on 6/3/2025 11:56 AM  Indications: pain and diagnostic evaluation  Details: 22 G needle, anteromedial approach  Medications: 2 mL betamethasone acet,sod phos 6 mg/mL; 4 mL lidocaine 10 mg/mL  (1 %)  Outcome: tolerated well, no immediate complications  Procedure, treatment alternatives, risks and benefits explained, specific risks discussed. Consent was given by the patient. Immediately prior to procedure a time out was called to verify the correct patient, procedure, equipment, support staff and site/side marked as required. Patient was prepped and draped in the usual sterile fashion.

## 2025-06-04 RX ORDER — LIDOCAINE HYDROCHLORIDE 10 MG/ML
4 INJECTION, SOLUTION INFILTRATION; PERINEURAL
Status: COMPLETED | OUTPATIENT
Start: 2025-06-03 | End: 2025-06-03

## 2025-06-04 RX ORDER — BETAMETHASONE SODIUM PHOSPHATE AND BETAMETHASONE ACETATE 3; 3 MG/ML; MG/ML
2 INJECTION, SUSPENSION INTRA-ARTICULAR; INTRALESIONAL; INTRAMUSCULAR; SOFT TISSUE
Status: COMPLETED | OUTPATIENT
Start: 2025-06-03 | End: 2025-06-03

## 2025-06-04 ASSESSMENT — ENCOUNTER SYMPTOMS
EYES NEGATIVE: 1
NEUROLOGICAL NEGATIVE: 1
CARDIOVASCULAR NEGATIVE: 1
RESPIRATORY NEGATIVE: 1
HEMATOLOGIC/LYMPHATIC NEGATIVE: 1
GASTROINTESTINAL NEGATIVE: 1
ARTHRALGIAS: 1
FATIGUE: 1
PSYCHIATRIC NEGATIVE: 1
ENDOCRINE NEGATIVE: 1

## 2025-06-11 ENCOUNTER — HOSPITAL ENCOUNTER (OUTPATIENT)
Dept: RADIOLOGY | Facility: CLINIC | Age: 80
Discharge: HOME | End: 2025-06-11
Payer: MEDICARE

## 2025-06-11 ENCOUNTER — OFFICE VISIT (OUTPATIENT)
Dept: ORTHOPEDIC SURGERY | Facility: CLINIC | Age: 80
End: 2025-06-11
Payer: MEDICARE

## 2025-06-11 DIAGNOSIS — M25.552 LEFT HIP PAIN: Primary | ICD-10-CM

## 2025-06-11 DIAGNOSIS — M25.552 LEFT HIP PAIN: ICD-10-CM

## 2025-06-11 PROCEDURE — 1159F MED LIST DOCD IN RCRD: CPT | Performed by: ORTHOPAEDIC SURGERY

## 2025-06-11 PROCEDURE — 99212 OFFICE O/P EST SF 10 MIN: CPT | Performed by: ORTHOPAEDIC SURGERY

## 2025-06-11 PROCEDURE — 99213 OFFICE O/P EST LOW 20 MIN: CPT | Performed by: ORTHOPAEDIC SURGERY

## 2025-06-11 PROCEDURE — 73502 X-RAY EXAM HIP UNI 2-3 VIEWS: CPT | Mod: LT

## 2025-06-11 PROCEDURE — 73502 X-RAY EXAM HIP UNI 2-3 VIEWS: CPT | Mod: LEFT SIDE | Performed by: ORTHOPAEDIC SURGERY

## 2025-06-11 NOTE — PROGRESS NOTES
Subjective    Patient ID: Chas    Chief Complaint:   Chief Complaint   Patient presents with    Left Hip - Pain     New problem, xrays today     History of present illness    80-year-old gentleman presented to clinic today for evaluation new problem.  He seen us previously for left knee pain and was given a cortisone injection which gave some decent relief.  He has been on gabapentin and he has been in contact with his pain management provider to see if they can increase his dose.  He is having mild numbness and tingling in the left lower extremity mostly in the left foot.  He complains of posterior lateral left buttock pain.  No groin pain at this time.  He states his left buttock pain comes and goes.  He has seen orthopedic spine specialty previously but decided against any surgical intervention.      Past medical , Surgical, Family and social history reviewed.      Physical exam  General: No acute distress and breathing comfortably.  Patient is pleasant and cooperative with the examination.    Extremity  Left hip is neurovascular intact.  Good range of motion.  No sign of infection.  Compartment soft.  Capillary refill within normal is distally.  No groin pain with rotation.  Negative straight leg raise test.  Mild tenderness palpation over the lumbar spine and over the left SI joint.    Diagnostics  Please see dictated x-ray report  Imaging  No results found.    Cardiology, Vascular, and Other Imaging  XR hip left with pelvis when performed 2 or 3 views  Result Date: 6/11/2025  Interpreted By:  Blanka Colindres, STUDY: XR HIP LEFT WITH PELVIS WHEN PERFORMED 2 OR 3 VIEWS; 6/11/2025 11:37 am   INDICATION: Signs/Symptoms:pain.   ACCESSION NUMBER(S): [id number]   ORDERING CLINICIAN: BLANKA COLINDRES   FINDINGS: AP pelvis and lateral view of the left hip. Hip joint space is well-maintained without evidence of fracture dislocation or other bony abnormality significant calcification of his vascular  structures in his lower extremity with what appears to be grafting on the right side no signs of fracture dislocation or other bony abnormality     Signed by: Ananda Colinrdes 6/11/2025 12:04 PM Dictation workstation:   [id number]         Procedure  [ none]    Assessment  Left-sided SI joint dysfunction with lumbar radiculopathy    Treatment plan  1.  At this time we long discussion regards to continued conservative management.  2.  He will continue weightbearing activity as tolerated.  Continue with pain management as needed.  3.  We will refer him back to orthopedic spine for possible treatment for spinal stenosis which she was diagnosed with previously.  He will follow-up with us as needed.  For complete plan and/or surgical details, please refer to Dr. Colindres's portion of the split/shared dictation.  4.  All of the patient's questions were answered.    Orders Placed This Encounter    XR hip left with pelvis when performed 2 or 3 views       This note was prepared using voice recognition software.  The details of this note are correct and have been reviewed, and corrected to the best of my ability.  Some grammatical areas may persist related to the Dragon software    Miles Cassidy PA-C, [facility]  [facility] [place]    [phone]     In a face-to-face encounter performed today, I Ananda Colindres MD performed a history and physical examination, discussed pertinent diagnostic studies if indicated, and discussed diagnosis and management strategies with both the patient and the midlevel provider.  I reviewed the midlevel's note and agree with the documented findings and plan of care.  Greater than 50% of the evaluation and treatment decision was performed by the physician myself during today's visit.    80-year-old male been seen for both knees presents complaining of numbness and tingling down into his left foot he has chronic back problems he saw Dr. Albert was 10 years ago symptoms  are becoming progressively worse he feels it mostly on his left side in the posterior his left hip no new injury.  In addition he has minimal pain with internal ex rotation of the hip most of his Gelsyn SI joint posteriorly straight leg raise testing is positive neurologically intact distally with good muscle strength and brisk cap refill.  X-rays are obtained today [place] dictated x-ray report.  Impression is lumbar radiculitis left lower extremity.  Plan is to continue with his gabapentin and he is planning to increase his dosage per his pain management doctor he is can follow-up with Dr. Brewer for evaluation of his back.          This note was prepared using voice recognition software.  The details of this note are correct and have been reviewed, and corrected to the best of my ability.  Some grammatical areas may persist related to the Dragon software    Ananda Colinders MD  Senior Attending Physician  [facility]    [phone]

## 2025-06-23 ENCOUNTER — LAB (OUTPATIENT)
Dept: LAB | Facility: CLINIC | Age: 80
End: 2025-06-23
Payer: MEDICARE

## 2025-06-23 ENCOUNTER — INFUSION (OUTPATIENT)
Dept: HEMATOLOGY/ONCOLOGY | Facility: CLINIC | Age: 80
End: 2025-06-23
Payer: MEDICARE

## 2025-06-23 VITALS
RESPIRATION RATE: 17 BRPM | HEART RATE: 69 BPM | OXYGEN SATURATION: 94 % | WEIGHT: 174.38 LBS | SYSTOLIC BLOOD PRESSURE: 121 MMHG | BODY MASS INDEX: 28.15 KG/M2 | DIASTOLIC BLOOD PRESSURE: 71 MMHG | TEMPERATURE: 97.5 F

## 2025-06-23 DIAGNOSIS — D46.1 REFRACTORY ANEMIA WITH RING SIDEROBLASTS: ICD-10-CM

## 2025-06-23 DIAGNOSIS — D46.Z MDS (MYELODYSPLASTIC SYNDROME), LOW GRADE (MULTI): ICD-10-CM

## 2025-06-23 LAB
ALBUMIN SERPL BCP-MCNC: 4.4 G/DL (ref 3.4–5)
ALP SERPL-CCNC: 75 U/L (ref 33–136)
ALT SERPL W P-5'-P-CCNC: 7 U/L (ref 10–52)
ANION GAP SERPL CALC-SCNC: 15 MMOL/L (ref 10–20)
AST SERPL W P-5'-P-CCNC: 10 U/L (ref 9–39)
BASOPHILS # BLD AUTO: 0.15 X10*3/UL (ref 0–0.1)
BASOPHILS NFR BLD AUTO: 1.8 %
BILIRUB SERPL-MCNC: 1.2 MG/DL (ref 0–1.2)
BUN SERPL-MCNC: 36 MG/DL (ref 6–23)
CALCIUM SERPL-MCNC: 9.3 MG/DL (ref 8.6–10.3)
CHLORIDE SERPL-SCNC: 104 MMOL/L (ref 98–107)
CO2 SERPL-SCNC: 26 MMOL/L (ref 21–32)
CREAT SERPL-MCNC: 1.96 MG/DL (ref 0.5–1.3)
EGFRCR SERPLBLD CKD-EPI 2021: 34 ML/MIN/1.73M*2
EOSINOPHIL # BLD AUTO: 0.47 X10*3/UL (ref 0–0.4)
EOSINOPHIL NFR BLD AUTO: 5.6 %
ERYTHROCYTE [DISTWIDTH] IN BLOOD BY AUTOMATED COUNT: 22.9 % (ref 11.5–14.5)
GLUCOSE SERPL-MCNC: 201 MG/DL (ref 74–99)
HCT VFR BLD AUTO: 31.9 % (ref 41–52)
HGB BLD-MCNC: 10.7 G/DL (ref 13.5–17.5)
IMM GRANULOCYTES # BLD AUTO: 0.06 X10*3/UL (ref 0–0.5)
IMM GRANULOCYTES NFR BLD AUTO: 0.7 % (ref 0–0.9)
LYMPHOCYTES # BLD AUTO: 1.78 X10*3/UL (ref 0.8–3)
LYMPHOCYTES NFR BLD AUTO: 21.4 %
MCH RBC QN AUTO: 38.5 PG (ref 26–34)
MCHC RBC AUTO-ENTMCNC: 33.5 G/DL (ref 32–36)
MCV RBC AUTO: 115 FL (ref 80–100)
MONOCYTES # BLD AUTO: 0.89 X10*3/UL (ref 0.05–0.8)
MONOCYTES NFR BLD AUTO: 10.7 %
NEUTROPHILS # BLD AUTO: 4.97 X10*3/UL (ref 1.6–5.5)
NEUTROPHILS NFR BLD AUTO: 59.8 %
NRBC BLD-RTO: ABNORMAL /100{WBCS}
OVALOCYTES BLD QL SMEAR: NORMAL
PLATELET # BLD AUTO: 176 X10*3/UL (ref 150–450)
POTASSIUM SERPL-SCNC: 4.8 MMOL/L (ref 3.5–5.3)
PROT SERPL-MCNC: 6.7 G/DL (ref 6.4–8.2)
RBC # BLD AUTO: 2.78 X10*6/UL (ref 4.5–5.9)
RBC MORPH BLD: NORMAL
SODIUM SERPL-SCNC: 140 MMOL/L (ref 136–145)
STOMATOCYTES BLD QL SMEAR: NORMAL
WBC # BLD AUTO: 8.3 X10*3/UL (ref 4.4–11.3)

## 2025-06-23 PROCEDURE — 36415 COLL VENOUS BLD VENIPUNCTURE: CPT

## 2025-06-23 PROCEDURE — 85025 COMPLETE CBC W/AUTO DIFF WBC: CPT

## 2025-06-23 PROCEDURE — 96372 THER/PROPH/DIAG INJ SC/IM: CPT

## 2025-06-23 PROCEDURE — 2500000004 HC RX 250 GENERAL PHARMACY W/ HCPCS (ALT 636 FOR OP/ED): Mod: JW,TB | Performed by: INTERNAL MEDICINE

## 2025-06-23 PROCEDURE — 80053 COMPREHEN METABOLIC PANEL: CPT

## 2025-06-23 RX ORDER — FAMOTIDINE 10 MG/ML
20 INJECTION, SOLUTION INTRAVENOUS ONCE AS NEEDED
Status: DISCONTINUED | OUTPATIENT
Start: 2025-06-23 | End: 2025-06-23 | Stop reason: HOSPADM

## 2025-06-23 RX ORDER — DIPHENHYDRAMINE HYDROCHLORIDE 50 MG/ML
50 INJECTION, SOLUTION INTRAMUSCULAR; INTRAVENOUS AS NEEDED
Status: DISCONTINUED | OUTPATIENT
Start: 2025-06-23 | End: 2025-06-23 | Stop reason: HOSPADM

## 2025-06-23 RX ORDER — ALBUTEROL SULFATE 0.83 MG/ML
3 SOLUTION RESPIRATORY (INHALATION) AS NEEDED
OUTPATIENT
Start: 2025-07-14

## 2025-06-23 RX ORDER — FAMOTIDINE 10 MG/ML
20 INJECTION, SOLUTION INTRAVENOUS ONCE AS NEEDED
OUTPATIENT
Start: 2025-07-14

## 2025-06-23 RX ORDER — DIPHENHYDRAMINE HYDROCHLORIDE 50 MG/ML
50 INJECTION, SOLUTION INTRAMUSCULAR; INTRAVENOUS AS NEEDED
OUTPATIENT
Start: 2025-07-14

## 2025-06-23 RX ORDER — ALBUTEROL SULFATE 0.83 MG/ML
3 SOLUTION RESPIRATORY (INHALATION) AS NEEDED
Status: DISCONTINUED | OUTPATIENT
Start: 2025-06-23 | End: 2025-06-23 | Stop reason: HOSPADM

## 2025-06-23 RX ORDER — EPINEPHRINE 0.3 MG/.3ML
0.3 INJECTION SUBCUTANEOUS EVERY 5 MIN PRN
OUTPATIENT
Start: 2025-07-14

## 2025-06-23 RX ORDER — EPINEPHRINE 0.3 MG/.3ML
0.3 INJECTION SUBCUTANEOUS EVERY 5 MIN PRN
Status: DISCONTINUED | OUTPATIENT
Start: 2025-06-23 | End: 2025-06-23 | Stop reason: HOSPADM

## 2025-06-23 RX ADMIN — LUSPATERCEPT 137.5 MG: 75 INJECTION, POWDER, LYOPHILIZED, FOR SOLUTION SUBCUTANEOUS at 09:26

## 2025-06-23 ASSESSMENT — PAIN SCALES - GENERAL: PAINLEVEL_OUTOF10: 0-NO PAIN

## 2025-06-24 ENCOUNTER — HOSPITAL ENCOUNTER (OUTPATIENT)
Dept: RADIOLOGY | Facility: CLINIC | Age: 80
Discharge: HOME | End: 2025-06-24
Payer: MEDICARE

## 2025-06-24 ENCOUNTER — OFFICE VISIT (OUTPATIENT)
Dept: ORTHOPEDIC SURGERY | Facility: CLINIC | Age: 80
End: 2025-06-24
Payer: MEDICARE

## 2025-06-24 DIAGNOSIS — M54.50 LUMBAR PAIN: Primary | ICD-10-CM

## 2025-06-24 DIAGNOSIS — M54.50 LUMBAR PAIN: ICD-10-CM

## 2025-06-24 PROCEDURE — 99214 OFFICE O/P EST MOD 30 MIN: CPT | Performed by: ORTHOPAEDIC SURGERY

## 2025-06-24 PROCEDURE — 72120 X-RAY BEND ONLY L-S SPINE: CPT

## 2025-06-24 PROCEDURE — 99212 OFFICE O/P EST SF 10 MIN: CPT | Performed by: ORTHOPAEDIC SURGERY

## 2025-06-24 PROCEDURE — 72110 X-RAY EXAM L-2 SPINE 4/>VWS: CPT | Performed by: ORTHOPAEDIC SURGERY

## 2025-06-24 NOTE — PROGRESS NOTES
Chas Melgar is a 80 y.o. male who presents for No chief complaint on file..    HPI:  80-year-old gentleman here for new patient evaluation of low back pain and radicular symptoms.  Sent by Dr. Colindres.  He denies any fever chills nausea vomiting night sweats.  He has no bowel or bladder complaints.    Physical exam:  Physical exam is limited patient is in a wheelchair.  Well-nourished, well kept.No lymphangitis or lymphadenopathy in the examined extremities.  Gait normal slow and somewhat shuffling, ambulates in a wheelchair due to pain.  Can rise from a seated position and sit from a standing position with some difficulty can stand on heels and toes.   Examination of the back shows tenderness in the paraspinous musculature.  There is decreased range of motion in all directions due to guarding/muscle spasms and pain at extremes.  There is good strength and no instability.  Examination of the lower extremities reveals no point tenderness, swelling, or deformity.  Range of motion of the hips, knees, and ankles are full without crepitance, instability, or exacerbation of pain.  Strength is 4+/5 throughout, there is some diffuse generalized weakness throughout the lower extremity bilaterally but this is symmetric and even.  No redness, abrasions, or lesions on extremities  Gross sensation intact in the extremities. Clonus negative.  Affect normal.  Alert and oriented ×3.  Coordination normal.    Imaging studies:  AP lateral flexion-extension plain films of the lumbar spine were ordered and reviewed today.  X-rays of the left hip from June 11, 2025 were also reviewed.    Assessment:  80-year-old gentleman here for new patient evaluation of low back pain and radicular symptoms.  Sent by Dr. Colindres.  This is a patient that is seen Dr. Brewer about 10 years ago.  He has not had back surgery.  Dr. Colindres is seeing him for his knees which are moderate to severely degenerative bilaterally.  He also has some  hip issues that he is being treated for.  He is having the low back pain but he is also having radiating pain down through the right leg into the top of the right foot, he had an accident recently where he shut his knee in a car door and began having numbness tingling and pain running down his leg into the foot ever since.  Overall his back and leg symptoms are pretty even.  He was sent by Dr. Colindres to see if there was any component from his back that may be contributing to his symptoms.  His pain is better in the morning and gets worse throughout the day, by the end of the day he has to use a walker or most of the time a wheelchair because of the pain and stiffness and leg issues.  He is diffusely weak throughout the lower extremities.  No recent physical therapy or chiropractic care.    We have ordered and reviewed tests, x-rays x 3.  We have reviewed the notes from Dr. Colindres from June 11, 2025 that mention his back issues.  He is here with his wife today who is a helpful and necessary historian.  This is an exacerbation of a chronic problem that is affecting his bodily function.    For complete plan and/or surgical details, please refer to Dr. Brewer's portion of this split dictation.    -Madi Holguin PA-C    In a face-to-face encounter, I performed a history and physical examination, discussed pertinent diagnostic studies if indicated, and discussed diagnosis and management strategies with both the patient and the midlevel provider.  I reviewed the midlevel's note and agree with the documented findings and plan of care.    Patient has a long history of back pain and bilateral leg symptoms.  At this point its mainly his hips and knees.  It is unclear if that is coming from his actual hips and knees are more radicular.  He does have severe knee arthritis bilaterally but we can range his knees and does not reproduce a lot of pain.  Ranging of his hips does not reproduce any of his pain.  I saw him  about 10 years ago and I think we told him he had stenosis.  His x-rays were ordered and reviewed today and it shows diffuse degenerative changes with a spondylolisthesis at L4-5 which is grade 1.  He is in a wheelchair for the past while.  He uses that because of the hip and knee pain.  He also has back pain.  Hemoglobin A1c from May 6 was 7.9.    Assessment/plan: Patient with back pain and leg symptoms that may be coming from his hips and knees or may be radicular or neurogenic claudication in nature.  He has poorly controlled diabetes.  I am not sure that surgery is the best option for him.  He wants me to work this up further.  He is 80.  We are going to get him into physical therapy and get an MRI of his lumbar spine.  Will see him back after the MRI.  He is severely inhibited bodily function.      Jimmy Brewer MD  Orthopedic surgery

## 2025-06-25 ENCOUNTER — PATIENT OUTREACH (OUTPATIENT)
Dept: PRIMARY CARE | Facility: CLINIC | Age: 80
End: 2025-06-25
Payer: MEDICARE

## 2025-06-25 NOTE — PROGRESS NOTES
Patients PCP is Dr Helm.  Patient informed that he will no longer qualify for CM monthly outreach under Floridalma Piper CNP.

## 2025-06-27 ENCOUNTER — TELEPHONE (OUTPATIENT)
Dept: CARDIOLOGY | Facility: CLINIC | Age: 80
End: 2025-06-27
Payer: MEDICARE

## 2025-06-27 NOTE — TELEPHONE ENCOUNTER
[facility]  She said she would like to discuss some concerns with you  She asks to not let patient know  Requesting a personal call     Rin Ramirez- [phone]

## 2025-07-01 ENCOUNTER — OFFICE VISIT (OUTPATIENT)
Dept: ORTHOPEDIC SURGERY | Facility: CLINIC | Age: 80
End: 2025-07-01
Payer: MEDICARE

## 2025-07-01 DIAGNOSIS — M16.11 PRIMARY OSTEOARTHRITIS OF RIGHT HIP: ICD-10-CM

## 2025-07-01 DIAGNOSIS — M17.11 PRIMARY OSTEOARTHRITIS OF RIGHT KNEE: Primary | ICD-10-CM

## 2025-07-01 PROCEDURE — 2500000004 HC RX 250 GENERAL PHARMACY W/ HCPCS (ALT 636 FOR OP/ED): Mod: JZ | Performed by: ORTHOPAEDIC SURGERY

## 2025-07-01 PROCEDURE — 20610 DRAIN/INJ JOINT/BURSA W/O US: CPT | Mod: RT | Performed by: ORTHOPAEDIC SURGERY

## 2025-07-01 RX ADMIN — Medication 6 ML: at 11:31

## 2025-07-01 NOTE — PROGRESS NOTES
Subjective    Patient ID: Chas Melgar    Chief Complaint   Patient presents with    Right Knee - Injections     Synvisc ONE       Inj/Asp: Right knee on 7/1/2025 11:31 AM  Indications: pain  Details: 18 G needle, anteromedial approach  Medications: 6 mL hylan 48 mg/6 mL  Outcome: tolerated well, no immediate complications  Procedure, treatment alternatives, risks and benefits explained, specific risks discussed. Consent was given by the patient. Immediately prior to procedure a time out was called to verify the correct patient, procedure, equipment, support staff and site/side marked as required. Patient was prepped and draped in the usual sterile fashion.

## 2025-07-07 DIAGNOSIS — I10 ESSENTIAL HYPERTENSION: ICD-10-CM

## 2025-07-07 RX ORDER — DILTIAZEM HYDROCHLORIDE 240 MG/1
240 CAPSULE, COATED, EXTENDED RELEASE ORAL DAILY
Qty: 90 CAPSULE | Refills: 3 | Status: SHIPPED | OUTPATIENT
Start: 2025-07-07 | End: 2026-07-07

## 2025-07-07 NOTE — TELEPHONE ENCOUNTER
Received request for prescription refills for patient.   Patient follows with Dr. Goodwin    Request is for diltiazem  Is patient currently on medication yes    Last OV 10/24/2024  Next OV 7/24/2025    Pended for signing and sent to provider

## 2025-07-08 ENCOUNTER — OFFICE VISIT (OUTPATIENT)
Age: 80
End: 2025-07-08
Payer: COMMERCIAL

## 2025-07-08 VITALS
HEIGHT: 67 IN | DIASTOLIC BLOOD PRESSURE: 63 MMHG | SYSTOLIC BLOOD PRESSURE: 102 MMHG | BODY MASS INDEX: 26.64 KG/M2 | WEIGHT: 169.75 LBS | HEART RATE: 83 BPM

## 2025-07-08 DIAGNOSIS — Z79.4 DIABETES MELLITUS TREATED WITH INSULIN AND ORAL MEDICATION (MULTI): ICD-10-CM

## 2025-07-08 DIAGNOSIS — R05.3 CHRONIC COUGH: ICD-10-CM

## 2025-07-08 DIAGNOSIS — E11.69 TYPE 2 DIABETES MELLITUS WITH OTHER SPECIFIED COMPLICATION, UNSPECIFIED WHETHER LONG TERM INSULIN USE (HCC): Primary | ICD-10-CM

## 2025-07-08 DIAGNOSIS — E11.9 DIABETES MELLITUS TREATED WITH INSULIN AND ORAL MEDICATION (MULTI): ICD-10-CM

## 2025-07-08 DIAGNOSIS — Z79.84 DIABETES MELLITUS TREATED WITH INSULIN AND ORAL MEDICATION (MULTI): ICD-10-CM

## 2025-07-08 LAB
CHP ED QC CHECK: ABNORMAL
GLUCOSE BLD-MCNC: 242 MG/DL
HBA1C MFR BLD: 7.4 %

## 2025-07-08 PROCEDURE — 1123F ACP DISCUSS/DSCN MKR DOCD: CPT | Performed by: INTERNAL MEDICINE

## 2025-07-08 PROCEDURE — 83036 HEMOGLOBIN GLYCOSYLATED A1C: CPT | Performed by: INTERNAL MEDICINE

## 2025-07-08 PROCEDURE — 1036F TOBACCO NON-USER: CPT | Performed by: INTERNAL MEDICINE

## 2025-07-08 PROCEDURE — G8427 DOCREV CUR MEDS BY ELIG CLIN: HCPCS | Performed by: INTERNAL MEDICINE

## 2025-07-08 PROCEDURE — 82962 GLUCOSE BLOOD TEST: CPT | Performed by: INTERNAL MEDICINE

## 2025-07-08 PROCEDURE — 3051F HG A1C>EQUAL 7.0%<8.0%: CPT | Performed by: INTERNAL MEDICINE

## 2025-07-08 PROCEDURE — 99213 OFFICE O/P EST LOW 20 MIN: CPT | Performed by: INTERNAL MEDICINE

## 2025-07-08 PROCEDURE — G8419 CALC BMI OUT NRM PARAM NOF/U: HCPCS | Performed by: INTERNAL MEDICINE

## 2025-07-08 RX ORDER — PEN NEEDLE, DIABETIC 31 GX5/16"
NEEDLE, DISPOSABLE MISCELLANEOUS
Qty: 100 EACH | Refills: 5 | Status: SHIPPED | OUTPATIENT
Start: 2025-07-08

## 2025-07-08 RX ORDER — BLOOD-GLUCOSE METER
EACH MISCELLANEOUS
COMMUNITY
Start: 2025-04-08

## 2025-07-08 RX ORDER — GLIMEPIRIDE 4 MG/1
4 TABLET ORAL 2 TIMES DAILY
Qty: 180 TABLET | Refills: 1 | Status: SHIPPED | OUTPATIENT
Start: 2025-07-08

## 2025-07-08 RX ORDER — INSULIN GLARGINE 100 [IU]/ML
INJECTION, SOLUTION SUBCUTANEOUS
Qty: 5 ADJUSTABLE DOSE PRE-FILLED PEN SYRINGE | Refills: 3 | Status: SHIPPED | OUTPATIENT
Start: 2025-07-08

## 2025-07-08 ASSESSMENT — ENCOUNTER SYMPTOMS: COUGH: 1

## 2025-07-08 NOTE — PROGRESS NOTES
2025    Assessment:       Diagnosis Orders   1. Type 2 diabetes mellitus with other specified complication, unspecified whether long term insulin use (HCC)  POCT Glucose    POCT glycosylated hemoglobin (Hb A1C)    Basic Metabolic Panel    Hemoglobin A1C    Cody Nur MD, [facility]      2. Chronic cough  Cody Nur MD, [facility]            PLAN:     Orders Placed This Encounter   Procedures    Basic Metabolic Panel     Standing Status:   Future     Expected Date:   2025     Expiration Date:   2026    Hemoglobin A1C     Standing Status:   Future     Expected Date:   2025     Expiration Date:   2026    Cody Nur MD, [facility]     Referral Priority:   Routine     Referral Type:   Eval and Treat     Referral Reason:   Specialty Services Required     Referred to Provider:   Cody Valencia MD     Requested Specialty:   Pulmonary Disease     Number of Visits Requested:   1    POCT Glucose    POCT glycosylated hemoglobin (Hb A1C)     Orders Placed This Encounter   Medications    B-D ULTRAFINE III SHORT PEN 31G X 8 MM MISC     Sig: Once a day     Dispense:  100 each     Refill:  5    BASAGLAR KWIKPEN 100 UNIT/ML injection pen     Si units at bedtime     Dispense:  5 Adjustable Dose Pre-filled Pen Syringe     Refill:  3   Continue current dose of insulin glimepiride and Farxiga refer patient to pulmonology          Orders Placed This Encounter   Procedures    POCT Glucose    POCT glycosylated hemoglobin (Hb A1C)     No orders of the defined types were placed in this encounter.    No follow-ups on file.  Subjective:     Chief Complaint   Patient presents with    Diabetes     Vitals:    25 1046   BP: 102/63   Pulse: 83   Weight: 77 kg (169 lb 12.1 oz)   Height: 1.702 m (5' 7.01\")     Wt Readings from Last 3 Encounters:   25 77 kg (169 lb 12.1 oz)   25 79 kg (174 lb 2.6 oz)     BP Readings from Last 3 Encounters:

## 2025-07-14 ENCOUNTER — INFUSION (OUTPATIENT)
Dept: HEMATOLOGY/ONCOLOGY | Facility: CLINIC | Age: 80
End: 2025-07-14
Payer: MEDICARE

## 2025-07-14 ENCOUNTER — LAB (OUTPATIENT)
Dept: LAB | Facility: CLINIC | Age: 80
End: 2025-07-14
Payer: MEDICARE

## 2025-07-14 VITALS
HEART RATE: 97 BPM | TEMPERATURE: 97 F | SYSTOLIC BLOOD PRESSURE: 117 MMHG | BODY MASS INDEX: 27.4 KG/M2 | OXYGEN SATURATION: 93 % | DIASTOLIC BLOOD PRESSURE: 70 MMHG | RESPIRATION RATE: 16 BRPM | WEIGHT: 169.75 LBS

## 2025-07-14 DIAGNOSIS — D46.Z MDS (MYELODYSPLASTIC SYNDROME), LOW GRADE (MULTI): ICD-10-CM

## 2025-07-14 DIAGNOSIS — D46.1 REFRACTORY ANEMIA WITH RING SIDEROBLASTS: ICD-10-CM

## 2025-07-14 LAB
ALBUMIN SERPL BCP-MCNC: 4.6 G/DL (ref 3.4–5)
ALP SERPL-CCNC: 80 U/L (ref 33–136)
ALT SERPL W P-5'-P-CCNC: 7 U/L (ref 10–52)
ANION GAP SERPL CALC-SCNC: 16 MMOL/L (ref 10–20)
AST SERPL W P-5'-P-CCNC: 9 U/L (ref 9–39)
BASOPHILS # BLD AUTO: 0.06 X10*3/UL (ref 0–0.1)
BASOPHILS NFR BLD AUTO: 1 %
BILIRUB SERPL-MCNC: 1.3 MG/DL (ref 0–1.2)
BUN SERPL-MCNC: 44 MG/DL (ref 6–23)
CALCIUM SERPL-MCNC: 9.4 MG/DL (ref 8.6–10.3)
CHLORIDE SERPL-SCNC: 103 MMOL/L (ref 98–107)
CO2 SERPL-SCNC: 23 MMOL/L (ref 21–32)
CREAT SERPL-MCNC: 2.15 MG/DL (ref 0.5–1.3)
EGFRCR SERPLBLD CKD-EPI 2021: 30 ML/MIN/1.73M*2
EOSINOPHIL # BLD AUTO: 0.43 X10*3/UL (ref 0–0.4)
EOSINOPHIL NFR BLD AUTO: 7.4 %
ERYTHROCYTE [DISTWIDTH] IN BLOOD BY AUTOMATED COUNT: 22.1 % (ref 11.5–14.5)
GLUCOSE SERPL-MCNC: 241 MG/DL (ref 74–99)
HCT VFR BLD AUTO: 30.6 % (ref 41–52)
HGB BLD-MCNC: 10.1 G/DL (ref 13.5–17.5)
IMM GRANULOCYTES # BLD AUTO: 0.04 X10*3/UL (ref 0–0.5)
IMM GRANULOCYTES NFR BLD AUTO: 0.7 % (ref 0–0.9)
LYMPHOCYTES # BLD AUTO: 1.45 X10*3/UL (ref 0.8–3)
LYMPHOCYTES NFR BLD AUTO: 25 %
MCH RBC QN AUTO: 37.7 PG (ref 26–34)
MCHC RBC AUTO-ENTMCNC: 33 G/DL (ref 32–36)
MCV RBC AUTO: 114 FL (ref 80–100)
MONOCYTES # BLD AUTO: 0.58 X10*3/UL (ref 0.05–0.8)
MONOCYTES NFR BLD AUTO: 10 %
NEUTROPHILS # BLD AUTO: 3.23 X10*3/UL (ref 1.6–5.5)
NEUTROPHILS NFR BLD AUTO: 55.9 %
PLATELET # BLD AUTO: 178 X10*3/UL (ref 150–450)
POTASSIUM SERPL-SCNC: 4.5 MMOL/L (ref 3.5–5.3)
PROT SERPL-MCNC: 7 G/DL (ref 6.4–8.2)
RBC # BLD AUTO: 2.68 X10*6/UL (ref 4.5–5.9)
SODIUM SERPL-SCNC: 137 MMOL/L (ref 136–145)
WBC # BLD AUTO: 5.8 X10*3/UL (ref 4.4–11.3)

## 2025-07-14 PROCEDURE — 2500000004 HC RX 250 GENERAL PHARMACY W/ HCPCS (ALT 636 FOR OP/ED): Mod: JW,TB | Performed by: INTERNAL MEDICINE

## 2025-07-14 PROCEDURE — 96372 THER/PROPH/DIAG INJ SC/IM: CPT

## 2025-07-14 PROCEDURE — 85025 COMPLETE CBC W/AUTO DIFF WBC: CPT

## 2025-07-14 PROCEDURE — 80053 COMPREHEN METABOLIC PANEL: CPT

## 2025-07-14 PROCEDURE — 36415 COLL VENOUS BLD VENIPUNCTURE: CPT

## 2025-07-14 RX ORDER — DIPHENHYDRAMINE HYDROCHLORIDE 50 MG/ML
50 INJECTION, SOLUTION INTRAMUSCULAR; INTRAVENOUS AS NEEDED
OUTPATIENT
Start: 2025-08-04

## 2025-07-14 RX ORDER — FAMOTIDINE 10 MG/ML
20 INJECTION, SOLUTION INTRAVENOUS ONCE AS NEEDED
OUTPATIENT
Start: 2025-08-04

## 2025-07-14 RX ORDER — EPINEPHRINE 0.3 MG/.3ML
0.3 INJECTION SUBCUTANEOUS EVERY 5 MIN PRN
OUTPATIENT
Start: 2025-08-04

## 2025-07-14 RX ORDER — ALBUTEROL SULFATE 0.83 MG/ML
3 SOLUTION RESPIRATORY (INHALATION) AS NEEDED
OUTPATIENT
Start: 2025-08-04

## 2025-07-14 RX ADMIN — LUSPATERCEPT 135 MG: 75 INJECTION, POWDER, LYOPHILIZED, FOR SOLUTION SUBCUTANEOUS at 09:36

## 2025-07-14 ASSESSMENT — PAIN SCALES - GENERAL: PAINLEVEL_OUTOF10: 4

## 2025-07-18 ENCOUNTER — PATIENT MESSAGE (OUTPATIENT)
Dept: PRIMARY CARE | Facility: CLINIC | Age: 80
End: 2025-07-18
Payer: MEDICARE

## 2025-07-19 ENCOUNTER — HOSPITAL ENCOUNTER (OUTPATIENT)
Dept: RADIOLOGY | Facility: HOSPITAL | Age: 80
Discharge: HOME | End: 2025-07-19
Payer: MEDICARE

## 2025-07-19 DIAGNOSIS — M54.50 LUMBAR PAIN: ICD-10-CM

## 2025-07-19 PROCEDURE — 72148 MRI LUMBAR SPINE W/O DYE: CPT

## 2025-07-19 PROCEDURE — 72148 MRI LUMBAR SPINE W/O DYE: CPT | Performed by: RADIOLOGY

## 2025-07-22 ENCOUNTER — OFFICE VISIT (OUTPATIENT)
Dept: ORTHOPEDIC SURGERY | Facility: CLINIC | Age: 80
End: 2025-07-22
Payer: MEDICARE

## 2025-07-22 ENCOUNTER — APPOINTMENT (OUTPATIENT)
Dept: ORTHOPEDIC SURGERY | Facility: CLINIC | Age: 80
End: 2025-07-22
Payer: MEDICARE

## 2025-07-22 DIAGNOSIS — M54.16 LUMBAR RADICULOPATHY: Primary | ICD-10-CM

## 2025-07-22 PROCEDURE — 99212 OFFICE O/P EST SF 10 MIN: CPT | Performed by: ORTHOPAEDIC SURGERY

## 2025-07-22 PROCEDURE — 99214 OFFICE O/P EST MOD 30 MIN: CPT | Performed by: ORTHOPAEDIC SURGERY

## 2025-07-22 NOTE — PROGRESS NOTES
Chas Melgar is a 80 y.o. male who presents for Follow-up and Results of the Lower Back (MRI review).    HPI:  80-year-old gentleman here for low back MRI results.  He denies any fever chills nausea vomiting night sweats.  He has no bowel or bladder complaints.    Physical exam:  Well-nourished, well kept.No lymphangitis or lymphadenopathy in the examined extremities.  Gait is slow, patient is in a wheelchair today.  Can stand on heels and toes minimally, difficulty.   Examination of the back shows no significant tenderness in the paraspinous musculature.  There is decreased range of motion in all directions due to guarding/muscle spasms and pain at extremes.  There is good strength and no instability.  Examination of the lower extremities reveals no point tenderness, swelling, or deformity.  Range of motion of the hips, knees, and ankles are full without crepitance, instability, or exacerbation of pain.  Strength is 5/5 throughout, there is some diffuse deconditioning weakness throughout the lower extremities bilaterally.  I do not get any specific weakness in any muscle group..  No redness, abrasions, or lesions on extremities  Gross sensation intact in the extremities.  Affect normal.  Alert and oriented ×3.  Coordination normal.    Imaging studies:  An MRI of the lumbar spine was reviewed today from July 19, 2025.    Assessment:  This is a patient who was seen on June 24, 2025, he is here for MRI review.  He did get into physical therapy and has done several visits, and feels like it is improving his symptoms.  Overall he feels 50% better.  His back pain is now very intermittent and the leg symptoms that he was having prior to that are also better.  He still does have some hip and knee pain but he has been seeing Dr. Colindres for his knees.  He got a right knee injection with Dr. Colindres July 1, 2025, he is still having some pain in that knee however the pain in the left knee has resolved.  He is in a  wheelchair today, mostly because of his knees and a little bit because of his hips.  He is a poorly controlled diabetic, comprehensive metabolic panel from July 14, 2025 was reviewed, glucose is 241.  His MRI shows a large central herniated disc at L4-5.  He has some varying degrees of mild to moderate degenerative changes throughout the lumbar spine.    Have reviewed test today, MRI.  This is a patient with 2 chronic stable problems, back pain, leg pain.    For complete plan and/or surgical details, please refer to Dr. Brewer's portion of this split dictation.    -Madi Holguin PA-C    In a face-to-face encounter, I performed a history and physical examination, discussed pertinent diagnostic studies if indicated, and discussed diagnosis and management strategies with both the patient and the midlevel provider.  I reviewed the midlevel's note and agree with the documented findings and plan of care.    Patient here for a follow-up.  He had an MRI.  He has hip and knee pain bilaterally.  He does have Parkinson's.  He sees a neurologist regularly for that.  MRI was reviewed and shows severe stenosis centrally at L4-5 where there is a spondylolisthesis as well.  The stenosis is from a central right sided herniated disc.  This very well could be contributing to his hip and knee pain.  However he does not really exhibit a lot of pain otherwise.  He is in a wheelchair he says because his hips and knees hurt but I am not sure if there is a neurogenic claudication component going on here.  Also his symptoms could be parkinsonian in nature and his degenerative changes in nature in his hips and knees.  I had a long discussion with the patient and explained to them that their options are 1) live with the symptoms and see how they evolve, 2) physical therapy, 3) pain management or 4) surgery.  At this point he does not want surgery on his spine which I think is reasonable.  He is 80 years old and does have a lot of other  medical comorbidities.  He is severely inhibited bodily function and he uses a wheelchair to get around.  We will let him engage in activities as tolerated and continue to work with his hip and knee physicians as well as his Parkinson's doctor.  If ever wants to an operation we could consider an L4-5 TLIF but that would be an absolute last resort option.  He can follow-up on a as needed basis.    Jimmy Brewer MD  Orthopedic surgery

## 2025-07-23 ENCOUNTER — HOSPITAL ENCOUNTER (OUTPATIENT)
Dept: RADIOLOGY | Facility: HOSPITAL | Age: 80
Discharge: HOME | End: 2025-07-23
Payer: MEDICARE

## 2025-07-23 DIAGNOSIS — M16.11 PRIMARY OSTEOARTHRITIS OF RIGHT HIP: ICD-10-CM

## 2025-07-23 PROCEDURE — 20610 DRAIN/INJ JOINT/BURSA W/O US: CPT | Mod: RIGHT SIDE | Performed by: STUDENT IN AN ORGANIZED HEALTH CARE EDUCATION/TRAINING PROGRAM

## 2025-07-23 PROCEDURE — 77002 NEEDLE LOCALIZATION BY XRAY: CPT | Mod: RIGHT SIDE | Performed by: STUDENT IN AN ORGANIZED HEALTH CARE EDUCATION/TRAINING PROGRAM

## 2025-07-23 PROCEDURE — 77002 NEEDLE LOCALIZATION BY XRAY: CPT | Mod: RT

## 2025-07-23 PROCEDURE — 2500000004 HC RX 250 GENERAL PHARMACY W/ HCPCS (ALT 636 FOR OP/ED): Mod: JZ | Performed by: ORTHOPAEDIC SURGERY

## 2025-07-23 PROCEDURE — 96372 THER/PROPH/DIAG INJ SC/IM: CPT | Performed by: ORTHOPAEDIC SURGERY

## 2025-07-23 PROCEDURE — 2550000001 HC RX 255 CONTRASTS: Performed by: ORTHOPAEDIC SURGERY

## 2025-07-23 RX ORDER — BUPIVACAINE HYDROCHLORIDE 5 MG/ML
10 INJECTION, SOLUTION EPIDURAL; INTRACAUDAL; PERINEURAL ONCE
Status: COMPLETED | OUTPATIENT
Start: 2025-07-23 | End: 2025-07-23

## 2025-07-23 RX ORDER — METHYLPREDNISOLONE ACETATE 40 MG/ML
40 INJECTION, SUSPENSION INTRA-ARTICULAR; INTRALESIONAL; INTRAMUSCULAR; SOFT TISSUE ONCE
Status: COMPLETED | OUTPATIENT
Start: 2025-07-23 | End: 2025-07-23

## 2025-07-23 RX ORDER — LIDOCAINE HYDROCHLORIDE 20 MG/ML
20 INJECTION, SOLUTION INFILTRATION; PERINEURAL ONCE
Status: COMPLETED | OUTPATIENT
Start: 2025-07-23 | End: 2025-07-23

## 2025-07-23 RX ADMIN — LIDOCAINE HYDROCHLORIDE 10 ML: 20 INJECTION, SOLUTION INFILTRATION; PERINEURAL at 14:56

## 2025-07-23 RX ADMIN — IOHEXOL 2 ML: 300 INJECTION, SOLUTION INTRAVENOUS at 14:55

## 2025-07-23 RX ADMIN — METHYLPREDNISOLONE ACETATE 40 MG: 40 INJECTION, SUSPENSION INTRA-ARTICULAR; INTRALESIONAL; INTRAMUSCULAR; INTRASYNOVIAL; SOFT TISSUE at 14:56

## 2025-07-23 RX ADMIN — BUPIVACAINE HYDROCHLORIDE 3 ML: 5 INJECTION, SOLUTION EPIDURAL; INTRACAUDAL; PERINEURAL at 14:56

## 2025-07-24 ENCOUNTER — APPOINTMENT (OUTPATIENT)
Dept: CARDIOLOGY | Facility: CLINIC | Age: 80
End: 2025-07-24
Payer: MEDICARE

## 2025-07-24 VITALS
BODY MASS INDEX: 27.11 KG/M2 | SYSTOLIC BLOOD PRESSURE: 116 MMHG | HEART RATE: 75 BPM | DIASTOLIC BLOOD PRESSURE: 58 MMHG | HEIGHT: 67 IN | WEIGHT: 172.7 LBS

## 2025-07-24 DIAGNOSIS — D64.9 CHRONIC ANEMIA: ICD-10-CM

## 2025-07-24 DIAGNOSIS — E11.9 DIABETES MELLITUS TREATED WITH INSULIN AND ORAL MEDICATION (MULTI): ICD-10-CM

## 2025-07-24 DIAGNOSIS — Z95.1 S/P CABG X 5: ICD-10-CM

## 2025-07-24 DIAGNOSIS — Z79.84 DIABETES MELLITUS TREATED WITH INSULIN AND ORAL MEDICATION (MULTI): ICD-10-CM

## 2025-07-24 DIAGNOSIS — Z79.4 DIABETES MELLITUS TREATED WITH INSULIN AND ORAL MEDICATION (MULTI): ICD-10-CM

## 2025-07-24 DIAGNOSIS — I10 ESSENTIAL HYPERTENSION: ICD-10-CM

## 2025-07-24 DIAGNOSIS — I25.10 CORONARY ARTERY DISEASE INVOLVING NATIVE CORONARY ARTERY OF NATIVE HEART, UNSPECIFIED WHETHER ANGINA PRESENT: ICD-10-CM

## 2025-07-24 DIAGNOSIS — E78.2 MIXED HYPERLIPIDEMIA: ICD-10-CM

## 2025-07-24 DIAGNOSIS — Z87.891 FORMER SMOKER: ICD-10-CM

## 2025-07-24 PROCEDURE — 3078F DIAST BP <80 MM HG: CPT | Performed by: INTERNAL MEDICINE

## 2025-07-24 PROCEDURE — 99214 OFFICE O/P EST MOD 30 MIN: CPT | Performed by: INTERNAL MEDICINE

## 2025-07-24 PROCEDURE — 3074F SYST BP LT 130 MM HG: CPT | Performed by: INTERNAL MEDICINE

## 2025-07-24 PROCEDURE — 1159F MED LIST DOCD IN RCRD: CPT | Performed by: INTERNAL MEDICINE

## 2025-07-24 NOTE — PROGRESS NOTES
"Referred by Dr. Marroquin ref. provider found provider found for No chief complaint on file.       Chief complaint: No chief complaint on file.       History of Present Illness  Chas Melgar is a 80 y.o. year old male patient is here for follow-up.  He is on a wheelchair.  Does have significant degenerative joint disease.  That he had hip problem required some steroid injection.  He also has chronic anemia with hemoglobin of 10.  Followed by hematologist.  Cardiac wise he has been stable did not have any symptoms of palpitation or syncope.  Discussed with the patient we will continue medication will call for any problem and follow-up as scheduled    Past Medical History  Medical History[1]    Social History  Social History[2]    Family History   Family History[3]    Review of Systems  As per HPI, all other systems reviewed and negative.    Allergies:  RX Allergies[4]     Outpatient Medications:  Current Outpatient Medications   Medication Instructions    alfuzosin (Uroxatral) 10 mg 24 hr tablet 1 tablet, Daily (0630)    aspirin 81 mg EC tablet 1 tablet, Daily    BD Ultra-Fine Short Pen Needle 31 gauge x 5/16\" needle USE ONCE DAILY    calcitriol (ROCALTROL) 0.25 mcg, oral, Every other day    carbidopa-levodopa (Sinemet)  mg tablet Take by mouth.    cyanocobalamin (Vitamin B-12) 1,000 mcg tablet 2 tablets, Daily    dapagliflozin propanediol (FARXIGA) 10 mg, oral, Daily before breakfast    dilTIAZem CD (CARDIZEM CD) 240 mg, oral, Daily    finasteride (Proscar) 5 mg tablet 1 tablet, Daily (0630)    folic acid 0.8 mg capsule 1 tablet, Daily    gabapentin (NEURONTIN) 200 mg, oral, 2 times daily    glimepiride (AMARYL) 4 mg, oral, 2 times daily    hydroCHLOROthiazide (HYDRODIURIL) 25 mg, oral, Daily    isosorbide mononitrate ER (IMDUR) 30 mg, oral, Daily    Lantus Solostar U-100 Insulin 30 Units, subcutaneous, Nightly    lisinopril 10 mg, oral, Daily    nitroglycerin (NITROSTAT) 0.4 mg, sublingual, Every 5 min PRN "    pantoprazole (PROTONIX) 40 mg, oral, Daily    simvastatin (ZOCOR) 20 mg, oral, Nightly    tamsulosin (Flomax) 0.4 mg 24 hr capsule 1 capsule, Daily         Vitals:  There were no vitals filed for this visit.    Physical Exam:  Physical Exam  Vitals and nursing note reviewed.   Constitutional:       Appearance: Normal appearance.   HENT:      Head: Normocephalic and atraumatic.     Eyes:      Extraocular Movements: Extraocular movements intact.      Pupils: Pupils are equal, round, and reactive to light.       Cardiovascular:      Rate and Rhythm: Normal rate and regular rhythm.      Pulses: Normal pulses.   Pulmonary:      Effort: Pulmonary effort is normal.      Breath sounds: Normal breath sounds.     Musculoskeletal:         General: Normal range of motion.      Cervical back: Normal range of motion.      Right lower leg: No edema.      Left lower leg: No edema.     Skin:     General: Skin is warm and dry.     Neurological:      General: No focal deficit present.      Mental Status: He is alert and oriented to person, place, and time.             Assessment/Plan   Diagnoses and all orders for this visit:  Coronary artery disease involving native coronary artery of native heart, unspecified whether angina present  S/P CABG x 5  Essential hypertension  Mixed hyperlipidemia  Diabetes mellitus treated with insulin and oral medication (Multi)  BMI 27.0-27.9,adult  Chronic anemia  Former smoker          Rocio YARBROUGH LPN am scribing for, and in the presence of Wilfrido Goodwin M.D..    Wilfrido YARBROUGH M.D., personally performed the services described in the documentation as scribed by Rocio Pena LPN in my presence, and confirm it is both accurate and complete.    Wilfrido Goodwin MD [facility]  Interventional Cardiology   of [facility]     Thank you for allowing me to participate in the care of this patient. Please do not hesitate to contact me with any further questions or concerns.         [1]   Past  Medical History:  Diagnosis Date    Acute gastric ulcer with hemorrhage 09/07/2022    Acute gastric ulcer with bleeding    Acute upper respiratory infection, unspecified     URI, acute    Anemia 2012    Arthritis     Body mass index (BMI) 28.0-28.9, adult 10/19/2021    BMI 28.0-28.9,adult    Cancer (Multi)     Chronic kidney disease     Coronary artery disease     Diabetes mellitus (Multi)     Encounter for other preprocedural examination 10/14/2021    Pre-operative clearance    Encounter for screening for malignant neoplasm of prostate     Encounter for prostate cancer screening    Hypertension     Impingement syndrome of unspecified shoulder 07/22/2021    Impingement syndrome of shoulder region, unspecified laterality    Other abnormalities of breathing     Difficulty breathing    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)     Other specified disorders of pancreatic internal secretion    Other specified follicular disorders     Actinic folliculitis    Other specified postprocedural states 06/09/2021    Status post arthroscopy of left shoulder    Overweight 02/21/2022    Overweight with body mass index (BMI) of 29 to 29.9 in adult    Overweight 01/18/2022    Overweight with body mass index (BMI) of 28 to 28.9 in adult    Pain in right hip     Hip pain, right    Pain in right leg     Pain of right lower extremity    Pain in unspecified hip 08/04/2021    Hip joint pain    Pain in unspecified shoulder 08/10/2021    Shoulder pain    Personal history of malignant neoplasm, unspecified 08/04/2021    History of malignant neoplasm    Personal history of other diseases of male genital organs     History of benign prostatic hyperplasia    Personal history of other diseases of the circulatory system 08/04/2021    History of hypertension    Personal history of other diseases of the circulatory system 08/04/2021    History of cardiac disorder    Personal history of other diseases of the digestive  system 10/14/2021    History of gastrointestinal hemorrhage    Personal history of other diseases of the digestive system 02/26/2022    History of right inguinal hernia    Personal history of other diseases of the digestive system     History of fatty infiltration of liver    Personal history of other diseases of the musculoskeletal system and connective tissue 08/04/2021    History of arthritis    Personal history of other diseases of the musculoskeletal system and connective tissue 08/05/2021    History of rotator cuff tear    Personal history of other diseases of the respiratory system 11/17/2021    History of upper respiratory infection    Personal history of other diseases of the respiratory system     History of acute bronchitis    Personal history of other diseases of the respiratory system     History of acute pharyngitis    Personal history of other diseases of the respiratory system     History of acute sinusitis    Personal history of other diseases of urinary system 09/01/2022    History of renal insufficiency syndrome    Personal history of other endocrine, nutritional and metabolic disease 10/06/2021    History of diabetes mellitus    Personal history of other endocrine, nutritional and metabolic disease 01/18/2022    History of hyperkalemia    Personal history of other malignant neoplasm of skin     History of other malignant neoplasm of skin    Personal history of other specified conditions 09/01/2022    History of chest pain    Personal history of other specified conditions     History of dysuria    Personal history of other specified conditions     History of chest pain    Persons encountering health services in other specified circumstances     Encounter for support and coordination of transition of care    Primary osteoarthritis, 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    DJD of left shoulder    Right lower quadrant pain 02/26/2022    Right inguinal pain    Strain of muscle, fascia and tendon of lower back,  initial encounter     Lumbar strain    Strain of muscle, fascia and tendon of other parts of biceps, right arm, initial encounter 10/14/2021    Rupture of right biceps tendon, initial encounter    Trochanteric bursitis, left hip 08/10/2021    Trochanteric bursitis of left hip   [2]   Social History  Tobacco Use    Smoking status: Former     Current packs/day: 0.00     Average packs/day: 2.0 packs/day for 20.0 years (40.0 ttl pk-yrs)     Types: Cigarettes     Start date: 1966     Quit date: 1986     Years since quittin.5    Smokeless tobacco: Never   Vaping Use    Vaping status: Never Used   Substance Use Topics    Alcohol use: Yes     Alcohol/week: 2.0 standard drinks of alcohol     Types: 2 Standard drinks or equivalent per week     Comment: weekly    Drug use: Never   [3]   Family History  Problem Relation Name Age of Onset    Diabetes Mother Melinda andino     Cancer Mother Melinda andino     Coronary artery disease Father      Hypertension Other      Alzheimer's disease Other     [4]   Allergies  Allergen Reactions    Aluminum Rash     Aluminum    Nickel Unknown and Rash     NICKEL  CONTACT DERMATITIS

## 2025-07-28 ENCOUNTER — OFFICE VISIT (OUTPATIENT)
Age: 80
End: 2025-07-28
Payer: COMMERCIAL

## 2025-07-28 VITALS
OXYGEN SATURATION: 96 % | BODY MASS INDEX: 27.4 KG/M2 | SYSTOLIC BLOOD PRESSURE: 100 MMHG | TEMPERATURE: 96.9 F | HEART RATE: 76 BPM | WEIGHT: 175 LBS | DIASTOLIC BLOOD PRESSURE: 50 MMHG

## 2025-07-28 DIAGNOSIS — Z87.891 PERSONAL HISTORY OF TOBACCO USE: ICD-10-CM

## 2025-07-28 DIAGNOSIS — R05.3 CHRONIC COUGH: Primary | ICD-10-CM

## 2025-07-28 DIAGNOSIS — J44.9 CHRONIC OBSTRUCTIVE PULMONARY DISEASE, UNSPECIFIED COPD TYPE (HCC): ICD-10-CM

## 2025-07-28 PROCEDURE — 3023F SPIROM DOC REV: CPT | Performed by: INTERNAL MEDICINE

## 2025-07-28 PROCEDURE — G8427 DOCREV CUR MEDS BY ELIG CLIN: HCPCS | Performed by: INTERNAL MEDICINE

## 2025-07-28 PROCEDURE — 1123F ACP DISCUSS/DSCN MKR DOCD: CPT | Performed by: INTERNAL MEDICINE

## 2025-07-28 PROCEDURE — 99204 OFFICE O/P NEW MOD 45 MIN: CPT | Performed by: INTERNAL MEDICINE

## 2025-07-28 PROCEDURE — G8419 CALC BMI OUT NRM PARAM NOF/U: HCPCS | Performed by: INTERNAL MEDICINE

## 2025-07-28 PROCEDURE — 1036F TOBACCO NON-USER: CPT | Performed by: INTERNAL MEDICINE

## 2025-07-28 ASSESSMENT — ENCOUNTER SYMPTOMS
COUGH: 1
RHINORRHEA: 0
SHORTNESS OF BREATH: 1
ABDOMINAL PAIN: 0
VOMITING: 0
VOICE CHANGE: 0
EYE ITCHING: 0
WHEEZING: 0
SORE THROAT: 0
NAUSEA: 0
CHEST TIGHTNESS: 0
DIARRHEA: 0

## 2025-07-28 NOTE — PROGRESS NOTES
Subjective:             Lucas Boswell is a 80 y.o. male who complains today of:     Chief Complaint   Patient presents with    New Patient     Ref by Tommy for chronic cough        HPI  He was sent here for cough since last few  months , more than than 6 month .  He said he does not anything other than robitussin  prn bases.   As per wife he chokes on dry stuff , pretzel , peanut , popcorn  etc, in few minute clears up . Cough is dry  , no mucus  or sometime clear mucus.   C/o shortness of breath with exertion.   No  Wheezing. No Hemoptysis.No Chest tightness.   No Chest pain with radiation  or pleuritic pain.  No  leg edema. No orthopnea.No Fever or chills.   No Rhinorrhea and postnasal drip.  He was smoking 2 ppd  since 14 yrs old , heavy  smoking 2 ppd for 24 yrs , quit in   He said he was diagnose with COPD but does not use any bronchodilator or O2     He had DM , parkinson disease , MDS, hemochromatosis, back pain , CAD s/p CABG, hyperlipidemia     Allergies:  Aluminum and Nickel  No past medical history on file.  No past surgical history on file.  No family history on file.  Social History     Socioeconomic History    Marital status: Unknown     Spouse name: Not on file    Number of children: Not on file    Years of education: Not on file    Highest education level: Not on file   Occupational History    Not on file   Tobacco Use    Smoking status: Former     Current packs/day: 0.00     Average packs/day: 2.0 packs/day for 10.0 years (20.0 ttl pk-yrs)     Types: Cigarettes     Start date:      Quit date:      Years since quittin.5     Passive exposure: Past    Smokeless tobacco: Never   Vaping Use    Vaping status: Never Used   Substance and Sexual Activity    Alcohol use: Yes     Alcohol/week: 3.0 standard drinks of alcohol     Types: 1 Cans of beer, 2 Shots of liquor per week     Comment: occ    Drug use: Never    Sexual activity: Not on file   Other Topics Concern    Not on file

## 2025-07-30 ENCOUNTER — APPOINTMENT (OUTPATIENT)
Dept: NEPHROLOGY | Facility: CLINIC | Age: 80
End: 2025-07-30
Payer: MEDICARE

## 2025-07-30 VITALS — DIASTOLIC BLOOD PRESSURE: 53 MMHG | SYSTOLIC BLOOD PRESSURE: 101 MMHG | HEART RATE: 77 BPM

## 2025-07-30 DIAGNOSIS — N18.32 STAGE 3B CHRONIC KIDNEY DISEASE (MULTI): ICD-10-CM

## 2025-07-30 DIAGNOSIS — I10 ESSENTIAL HYPERTENSION: ICD-10-CM

## 2025-07-30 DIAGNOSIS — E21.3 HYPERPARATHYROIDISM (MULTI): ICD-10-CM

## 2025-07-30 DIAGNOSIS — E08.22 DIABETES MELLITUS DUE TO UNDERLYING CONDITION WITH DIABETIC CHRONIC KIDNEY DISEASE, UNSPECIFIED CKD STAGE, UNSPECIFIED WHETHER LONG TERM INSULIN USE: Primary | ICD-10-CM

## 2025-07-30 DIAGNOSIS — R80.8 OTHER PROTEINURIA: ICD-10-CM

## 2025-07-30 PROCEDURE — 3074F SYST BP LT 130 MM HG: CPT | Performed by: INTERNAL MEDICINE

## 2025-07-30 PROCEDURE — 3078F DIAST BP <80 MM HG: CPT | Performed by: INTERNAL MEDICINE

## 2025-07-30 PROCEDURE — 99213 OFFICE O/P EST LOW 20 MIN: CPT | Performed by: INTERNAL MEDICINE

## 2025-07-30 PROCEDURE — 1159F MED LIST DOCD IN RCRD: CPT | Performed by: INTERNAL MEDICINE

## 2025-08-02 DIAGNOSIS — I20.9 ANGINA, CLASS II: ICD-10-CM

## 2025-08-02 DIAGNOSIS — Z95.1 S/P CABG X 5: ICD-10-CM

## 2025-08-02 DIAGNOSIS — I25.10 CORONARY ARTERY DISEASE INVOLVING NATIVE CORONARY ARTERY OF NATIVE HEART, UNSPECIFIED WHETHER ANGINA PRESENT: ICD-10-CM

## 2025-08-04 ENCOUNTER — OFFICE VISIT (OUTPATIENT)
Dept: HEMATOLOGY/ONCOLOGY | Facility: CLINIC | Age: 80
End: 2025-08-04
Payer: COMMERCIAL

## 2025-08-04 ENCOUNTER — LAB (OUTPATIENT)
Dept: LAB | Facility: CLINIC | Age: 80
End: 2025-08-04
Payer: COMMERCIAL

## 2025-08-04 ENCOUNTER — INFUSION (OUTPATIENT)
Dept: HEMATOLOGY/ONCOLOGY | Facility: CLINIC | Age: 80
End: 2025-08-04
Payer: COMMERCIAL

## 2025-08-04 VITALS
HEART RATE: 76 BPM | RESPIRATION RATE: 16 BRPM | DIASTOLIC BLOOD PRESSURE: 56 MMHG | WEIGHT: 175.71 LBS | TEMPERATURE: 97.3 F | SYSTOLIC BLOOD PRESSURE: 109 MMHG | OXYGEN SATURATION: 93 % | BODY MASS INDEX: 27.52 KG/M2

## 2025-08-04 DIAGNOSIS — I73.9 PVD (PERIPHERAL VASCULAR DISEASE): ICD-10-CM

## 2025-08-04 DIAGNOSIS — D46.1 REFRACTORY ANEMIA WITH RING SIDEROBLASTS: ICD-10-CM

## 2025-08-04 DIAGNOSIS — D46.Z MDS (MYELODYSPLASTIC SYNDROME), LOW GRADE (MULTI): Primary | ICD-10-CM

## 2025-08-04 DIAGNOSIS — E08.65 DIABETES MELLITUS DUE TO UNDERLYING CONDITION WITH HYPERGLYCEMIA, WITH LONG-TERM CURRENT USE OF INSULIN: ICD-10-CM

## 2025-08-04 DIAGNOSIS — I10 ESSENTIAL HYPERTENSION: ICD-10-CM

## 2025-08-04 DIAGNOSIS — E83.110 HEREDITARY HEMOCHROMATOSIS: ICD-10-CM

## 2025-08-04 DIAGNOSIS — D46.Z MDS (MYELODYSPLASTIC SYNDROME), LOW GRADE (MULTI): ICD-10-CM

## 2025-08-04 DIAGNOSIS — Z79.4 DIABETES MELLITUS DUE TO UNDERLYING CONDITION WITH HYPERGLYCEMIA, WITH LONG-TERM CURRENT USE OF INSULIN: ICD-10-CM

## 2025-08-04 LAB
ALBUMIN SERPL BCP-MCNC: 4.2 G/DL (ref 3.4–5)
ALP SERPL-CCNC: 76 U/L (ref 33–136)
ALT SERPL W P-5'-P-CCNC: <3 U/L (ref 10–52)
ANION GAP SERPL CALC-SCNC: 12 MMOL/L (ref 10–20)
AST SERPL W P-5'-P-CCNC: 9 U/L (ref 9–39)
BASOPHILS # BLD AUTO: 0.09 X10*3/UL (ref 0–0.1)
BASOPHILS NFR BLD AUTO: 1.3 %
BILIRUB SERPL-MCNC: 1.1 MG/DL (ref 0–1.2)
BUN SERPL-MCNC: 36 MG/DL (ref 6–23)
CALCIUM SERPL-MCNC: 8.7 MG/DL (ref 8.6–10.3)
CHLORIDE SERPL-SCNC: 105 MMOL/L (ref 98–107)
CO2 SERPL-SCNC: 26 MMOL/L (ref 21–32)
CREAT SERPL-MCNC: 1.8 MG/DL (ref 0.5–1.3)
EGFRCR SERPLBLD CKD-EPI 2021: 37 ML/MIN/1.73M*2
EOSINOPHIL # BLD AUTO: 0.41 X10*3/UL (ref 0–0.4)
EOSINOPHIL NFR BLD AUTO: 5.7 %
ERYTHROCYTE [DISTWIDTH] IN BLOOD BY AUTOMATED COUNT: 22.9 % (ref 11.5–14.5)
GLUCOSE SERPL-MCNC: 179 MG/DL (ref 74–99)
HCT VFR BLD AUTO: 29.6 % (ref 41–52)
HGB BLD-MCNC: 9.9 G/DL (ref 13.5–17.5)
IMM GRANULOCYTES # BLD AUTO: 0.04 X10*3/UL (ref 0–0.5)
IMM GRANULOCYTES NFR BLD AUTO: 0.6 % (ref 0–0.9)
LYMPHOCYTES # BLD AUTO: 1.26 X10*3/UL (ref 0.8–3)
LYMPHOCYTES NFR BLD AUTO: 17.5 %
MCH RBC QN AUTO: 38.8 PG (ref 26–34)
MCHC RBC AUTO-ENTMCNC: 33.4 G/DL (ref 32–36)
MCV RBC AUTO: 116 FL (ref 80–100)
MONOCYTES # BLD AUTO: 0.96 X10*3/UL (ref 0.05–0.8)
MONOCYTES NFR BLD AUTO: 13.4 %
NEUTROPHILS # BLD AUTO: 4.43 X10*3/UL (ref 1.6–5.5)
NEUTROPHILS NFR BLD AUTO: 61.5 %
NRBC BLD-RTO: ABNORMAL /100{WBCS}
PLATELET # BLD AUTO: 144 X10*3/UL (ref 150–450)
POTASSIUM SERPL-SCNC: 4.1 MMOL/L (ref 3.5–5.3)
PROT SERPL-MCNC: 6.1 G/DL (ref 6.4–8.2)
RBC # BLD AUTO: 2.55 X10*6/UL (ref 4.5–5.9)
SODIUM SERPL-SCNC: 139 MMOL/L (ref 136–145)
WBC # BLD AUTO: 7.2 X10*3/UL (ref 4.4–11.3)

## 2025-08-04 PROCEDURE — 1160F RVW MEDS BY RX/DR IN RCRD: CPT | Performed by: INTERNAL MEDICINE

## 2025-08-04 PROCEDURE — 99214 OFFICE O/P EST MOD 30 MIN: CPT | Performed by: INTERNAL MEDICINE

## 2025-08-04 PROCEDURE — 2500000004 HC RX 250 GENERAL PHARMACY W/ HCPCS (ALT 636 FOR OP/ED): Mod: JW | Performed by: INTERNAL MEDICINE

## 2025-08-04 PROCEDURE — 96372 THER/PROPH/DIAG INJ SC/IM: CPT

## 2025-08-04 PROCEDURE — 3074F SYST BP LT 130 MM HG: CPT | Performed by: INTERNAL MEDICINE

## 2025-08-04 PROCEDURE — 3078F DIAST BP <80 MM HG: CPT | Performed by: INTERNAL MEDICINE

## 2025-08-04 PROCEDURE — 1159F MED LIST DOCD IN RCRD: CPT | Performed by: INTERNAL MEDICINE

## 2025-08-04 PROCEDURE — 36415 COLL VENOUS BLD VENIPUNCTURE: CPT

## 2025-08-04 PROCEDURE — 1126F AMNT PAIN NOTED NONE PRSNT: CPT | Performed by: INTERNAL MEDICINE

## 2025-08-04 PROCEDURE — 80053 COMPREHEN METABOLIC PANEL: CPT

## 2025-08-04 PROCEDURE — 85025 COMPLETE CBC W/AUTO DIFF WBC: CPT

## 2025-08-04 RX ORDER — DIPHENHYDRAMINE HYDROCHLORIDE 50 MG/ML
50 INJECTION, SOLUTION INTRAMUSCULAR; INTRAVENOUS AS NEEDED
OUTPATIENT
Start: 2025-08-25

## 2025-08-04 RX ORDER — EPINEPHRINE 0.3 MG/.3ML
0.3 INJECTION SUBCUTANEOUS EVERY 5 MIN PRN
OUTPATIENT
Start: 2025-08-25

## 2025-08-04 RX ORDER — ISOSORBIDE MONONITRATE 30 MG/1
30 TABLET, EXTENDED RELEASE ORAL
Qty: 90 TABLET | Refills: 3 | Status: SHIPPED | OUTPATIENT
Start: 2025-08-04 | End: 2026-08-04

## 2025-08-04 RX ORDER — FAMOTIDINE 10 MG/ML
20 INJECTION, SOLUTION INTRAVENOUS ONCE AS NEEDED
OUTPATIENT
Start: 2025-08-25

## 2025-08-04 RX ORDER — ALBUTEROL SULFATE 0.83 MG/ML
3 SOLUTION RESPIRATORY (INHALATION) AS NEEDED
OUTPATIENT
Start: 2025-08-25

## 2025-08-04 RX ADMIN — LUSPATERCEPT 140 MG: 75 INJECTION, POWDER, LYOPHILIZED, FOR SOLUTION SUBCUTANEOUS at 10:13

## 2025-08-04 ASSESSMENT — ENCOUNTER SYMPTOMS
HEMATOLOGIC/LYMPHATIC NEGATIVE: 1
PSYCHIATRIC NEGATIVE: 1
CARDIOVASCULAR NEGATIVE: 1
EXTREMITY WEAKNESS: 1
ENDOCRINE NEGATIVE: 1
EYES NEGATIVE: 1
ARTHRALGIAS: 1
GASTROINTESTINAL NEGATIVE: 1
FATIGUE: 1
RESPIRATORY NEGATIVE: 1

## 2025-08-04 ASSESSMENT — PAIN SCALES - GENERAL: PAINLEVEL_OUTOF10: 0-NO PAIN

## 2025-08-04 NOTE — TELEPHONE ENCOUNTER
Received request for prescription refills for patient.   Patient follows with Dr. Goodwin    Request is for Imdur  Is patient currently on medication yes    Last OV 7/24/2025  Next OV 4/23/2026    Pended for signing and sent to provider
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
## 2025-08-13 ENCOUNTER — OFFICE VISIT (OUTPATIENT)
Dept: ORTHOPEDIC SURGERY | Facility: CLINIC | Age: 80
End: 2025-08-13
Payer: COMMERCIAL

## 2025-08-13 DIAGNOSIS — M16.11 PRIMARY OSTEOARTHRITIS OF RIGHT HIP: Primary | ICD-10-CM

## 2025-08-13 PROCEDURE — 99212 OFFICE O/P EST SF 10 MIN: CPT | Performed by: ORTHOPAEDIC SURGERY

## 2025-08-13 PROCEDURE — 99213 OFFICE O/P EST LOW 20 MIN: CPT | Performed by: ORTHOPAEDIC SURGERY

## 2025-08-15 ENCOUNTER — HOSPITAL ENCOUNTER (OUTPATIENT)
Dept: GENERAL RADIOLOGY | Age: 80
Discharge: HOME | End: 2025-08-17
Payer: COMMERCIAL

## 2025-08-15 ENCOUNTER — HOSPITAL ENCOUNTER (OUTPATIENT)
Dept: PULMONOLOGY | Age: 80
Discharge: HOME | End: 2025-08-15
Attending: INTERNAL MEDICINE
Payer: COMMERCIAL

## 2025-08-15 ENCOUNTER — HOSPITAL ENCOUNTER (OUTPATIENT)
Dept: GENERAL RADIOLOGY | Age: 80
Discharge: HOME | End: 2025-08-17
Attending: INTERNAL MEDICINE
Payer: COMMERCIAL

## 2025-08-15 ENCOUNTER — HOSPITAL ENCOUNTER (OUTPATIENT)
Dept: SPEECH THERAPY | Age: 80
Setting detail: THERAPIES SERIES
Discharge: HOME | End: 2025-08-15
Attending: INTERNAL MEDICINE

## 2025-08-15 DIAGNOSIS — J44.9 CHRONIC OBSTRUCTIVE PULMONARY DISEASE, UNSPECIFIED COPD TYPE (HCC): ICD-10-CM

## 2025-08-15 DIAGNOSIS — R05.3 CHRONIC COUGH: ICD-10-CM

## 2025-08-15 PROCEDURE — 92611 MOTION FLUOROSCOPY/SWALLOW: CPT

## 2025-08-15 PROCEDURE — 94729 DIFFUSING CAPACITY: CPT

## 2025-08-15 PROCEDURE — 94060 EVALUATION OF WHEEZING: CPT

## 2025-08-15 PROCEDURE — 71046 X-RAY EXAM CHEST 2 VIEWS: CPT

## 2025-08-15 PROCEDURE — 74230 X-RAY XM SWLNG FUNCJ C+: CPT

## 2025-08-15 PROCEDURE — 94726 PLETHYSMOGRAPHY LUNG VOLUMES: CPT

## 2025-08-15 PROCEDURE — 2500000003 HC RX 250 WO HCPCS: Performed by: INTERNAL MEDICINE

## 2025-08-15 PROCEDURE — 6360000002 HC RX W HCPCS

## 2025-08-15 RX ORDER — ALBUTEROL SULFATE 0.83 MG/ML
SOLUTION RESPIRATORY (INHALATION)
Status: COMPLETED
Start: 2025-08-15 | End: 2025-08-15

## 2025-08-15 RX ADMIN — ALBUTEROL SULFATE 2.5 MG: 2.5 SOLUTION RESPIRATORY (INHALATION) at 12:20

## 2025-08-15 RX ADMIN — BARIUM SULFATE 50 ML: 0.81 POWDER, FOR SUSPENSION ORAL at 11:49

## 2025-08-18 DIAGNOSIS — E11.9 DIABETES MELLITUS TREATED WITH INSULIN AND ORAL MEDICATION (MULTI): ICD-10-CM

## 2025-08-18 DIAGNOSIS — Z79.4 DIABETES MELLITUS TREATED WITH INSULIN AND ORAL MEDICATION (MULTI): ICD-10-CM

## 2025-08-18 DIAGNOSIS — G62.9 NEUROPATHY: ICD-10-CM

## 2025-08-18 DIAGNOSIS — Z79.84 DIABETES MELLITUS TREATED WITH INSULIN AND ORAL MEDICATION (MULTI): ICD-10-CM

## 2025-08-18 RX ORDER — GLIMEPIRIDE 4 MG/1
4 TABLET ORAL 2 TIMES DAILY
Qty: 180 TABLET | Refills: 1 | Status: SHIPPED | OUTPATIENT
Start: 2025-08-18

## 2025-08-18 RX ORDER — GABAPENTIN 100 MG/1
200 CAPSULE ORAL 2 TIMES DAILY
Qty: 120 CAPSULE | Refills: 1 | Status: SHIPPED | OUTPATIENT
Start: 2025-08-18 | End: 2026-02-14

## 2025-08-25 ENCOUNTER — LAB (OUTPATIENT)
Dept: LAB | Facility: CLINIC | Age: 80
End: 2025-08-25
Payer: COMMERCIAL

## 2025-08-25 ENCOUNTER — APPOINTMENT (OUTPATIENT)
Dept: HEMATOLOGY/ONCOLOGY | Facility: CLINIC | Age: 80
End: 2025-08-25
Payer: MEDICARE

## 2025-08-25 ENCOUNTER — INFUSION (OUTPATIENT)
Dept: HEMATOLOGY/ONCOLOGY | Facility: CLINIC | Age: 80
End: 2025-08-25
Payer: COMMERCIAL

## 2025-08-25 VITALS
DIASTOLIC BLOOD PRESSURE: 69 MMHG | TEMPERATURE: 98.1 F | RESPIRATION RATE: 17 BRPM | BODY MASS INDEX: 27.28 KG/M2 | OXYGEN SATURATION: 95 % | SYSTOLIC BLOOD PRESSURE: 151 MMHG | HEART RATE: 88 BPM | WEIGHT: 174.16 LBS

## 2025-08-25 DIAGNOSIS — D46.Z MDS (MYELODYSPLASTIC SYNDROME), LOW GRADE (MULTI): ICD-10-CM

## 2025-08-25 DIAGNOSIS — D46.1 REFRACTORY ANEMIA WITH RING SIDEROBLASTS: ICD-10-CM

## 2025-08-25 LAB
ALBUMIN SERPL BCP-MCNC: 4.4 G/DL (ref 3.4–5)
ALP SERPL-CCNC: 74 U/L (ref 33–136)
ALT SERPL W P-5'-P-CCNC: 10 U/L (ref 10–52)
ANION GAP SERPL CALC-SCNC: 16 MMOL/L (ref 10–20)
AST SERPL W P-5'-P-CCNC: 10 U/L (ref 9–39)
BASOPHILS # BLD AUTO: 0.09 X10*3/UL (ref 0–0.1)
BASOPHILS NFR BLD AUTO: 1.4 %
BILIRUB SERPL-MCNC: 1.5 MG/DL (ref 0–1.2)
BUN SERPL-MCNC: 39 MG/DL (ref 6–23)
CALCIUM SERPL-MCNC: 9.4 MG/DL (ref 8.6–10.3)
CHLORIDE SERPL-SCNC: 98 MMOL/L (ref 98–107)
CO2 SERPL-SCNC: 26 MMOL/L (ref 21–32)
CREAT SERPL-MCNC: 1.86 MG/DL (ref 0.5–1.3)
EGFRCR SERPLBLD CKD-EPI 2021: 36 ML/MIN/1.73M*2
EOSINOPHIL # BLD AUTO: 0.36 X10*3/UL (ref 0–0.4)
EOSINOPHIL NFR BLD AUTO: 5.5 %
ERYTHROCYTE [DISTWIDTH] IN BLOOD BY AUTOMATED COUNT: 22.1 % (ref 11.5–14.5)
GIANT PLATELETS BLD QL SMEAR: NORMAL
GLUCOSE SERPL-MCNC: 421 MG/DL (ref 74–99)
HCT VFR BLD AUTO: 32.7 % (ref 41–52)
HGB BLD-MCNC: 10.8 G/DL (ref 13.5–17.5)
IMM GRANULOCYTES # BLD AUTO: 0.03 X10*3/UL (ref 0–0.5)
IMM GRANULOCYTES NFR BLD AUTO: 0.5 % (ref 0–0.9)
LYMPHOCYTES # BLD AUTO: 1.41 X10*3/UL (ref 0.8–3)
LYMPHOCYTES NFR BLD AUTO: 21.6 %
MCH RBC QN AUTO: 38.2 PG (ref 26–34)
MCHC RBC AUTO-ENTMCNC: 33 G/DL (ref 32–36)
MCV RBC AUTO: 116 FL (ref 80–100)
MONOCYTES # BLD AUTO: 0.6 X10*3/UL (ref 0.05–0.8)
MONOCYTES NFR BLD AUTO: 9.2 %
NEUTROPHILS # BLD AUTO: 4.05 X10*3/UL (ref 1.6–5.5)
NEUTROPHILS NFR BLD AUTO: 61.8 %
NRBC BLD-RTO: ABNORMAL /100{WBCS}
OVALOCYTES BLD QL SMEAR: NORMAL
PLATELET # BLD AUTO: 189 X10*3/UL (ref 150–450)
POTASSIUM SERPL-SCNC: 4.9 MMOL/L (ref 3.5–5.3)
PROT SERPL-MCNC: 7 G/DL (ref 6.4–8.2)
RBC # BLD AUTO: 2.83 X10*6/UL (ref 4.5–5.9)
RBC MORPH BLD: NORMAL
SODIUM SERPL-SCNC: 135 MMOL/L (ref 136–145)
WBC # BLD AUTO: 6.5 X10*3/UL (ref 4.4–11.3)

## 2025-08-25 PROCEDURE — 36415 COLL VENOUS BLD VENIPUNCTURE: CPT

## 2025-08-25 PROCEDURE — 2500000004 HC RX 250 GENERAL PHARMACY W/ HCPCS (ALT 636 FOR OP/ED): Mod: JW | Performed by: INTERNAL MEDICINE

## 2025-08-25 PROCEDURE — 96372 THER/PROPH/DIAG INJ SC/IM: CPT

## 2025-08-25 PROCEDURE — 80053 COMPREHEN METABOLIC PANEL: CPT

## 2025-08-25 PROCEDURE — 85025 COMPLETE CBC W/AUTO DIFF WBC: CPT

## 2025-08-25 RX ORDER — ALBUTEROL SULFATE 0.83 MG/ML
3 SOLUTION RESPIRATORY (INHALATION) AS NEEDED
OUTPATIENT
Start: 2025-09-15

## 2025-08-25 RX ORDER — DIPHENHYDRAMINE HYDROCHLORIDE 50 MG/ML
50 INJECTION, SOLUTION INTRAMUSCULAR; INTRAVENOUS AS NEEDED
OUTPATIENT
Start: 2025-09-15

## 2025-08-25 RX ORDER — EPINEPHRINE 0.3 MG/.3ML
0.3 INJECTION SUBCUTANEOUS EVERY 5 MIN PRN
OUTPATIENT
Start: 2025-09-15

## 2025-08-25 RX ORDER — FAMOTIDINE 10 MG/ML
20 INJECTION, SOLUTION INTRAVENOUS ONCE AS NEEDED
OUTPATIENT
Start: 2025-09-15

## 2025-08-25 RX ADMIN — LUSPATERCEPT 137.5 MG: 75 INJECTION, POWDER, LYOPHILIZED, FOR SOLUTION SUBCUTANEOUS at 14:40

## 2025-08-25 ASSESSMENT — PAIN SCALES - GENERAL: PAINLEVEL_OUTOF10: 2

## 2025-08-26 ENCOUNTER — OFFICE VISIT (OUTPATIENT)
Age: 80
End: 2025-08-26
Payer: COMMERCIAL

## 2025-08-26 VITALS
HEART RATE: 92 BPM | OXYGEN SATURATION: 96 % | TEMPERATURE: 97.4 F | BODY MASS INDEX: 27.4 KG/M2 | WEIGHT: 175 LBS | SYSTOLIC BLOOD PRESSURE: 112 MMHG | DIASTOLIC BLOOD PRESSURE: 70 MMHG

## 2025-08-26 DIAGNOSIS — Z87.891 PERSONAL HISTORY OF TOBACCO USE: ICD-10-CM

## 2025-08-26 DIAGNOSIS — J44.9 CHRONIC OBSTRUCTIVE PULMONARY DISEASE, UNSPECIFIED COPD TYPE (HCC): Primary | ICD-10-CM

## 2025-08-26 DIAGNOSIS — R05.3 CHRONIC COUGH: ICD-10-CM

## 2025-08-26 PROCEDURE — 3023F SPIROM DOC REV: CPT | Performed by: INTERNAL MEDICINE

## 2025-08-26 PROCEDURE — G8419 CALC BMI OUT NRM PARAM NOF/U: HCPCS | Performed by: INTERNAL MEDICINE

## 2025-08-26 PROCEDURE — 99214 OFFICE O/P EST MOD 30 MIN: CPT | Performed by: INTERNAL MEDICINE

## 2025-08-26 PROCEDURE — 1123F ACP DISCUSS/DSCN MKR DOCD: CPT | Performed by: INTERNAL MEDICINE

## 2025-08-26 PROCEDURE — G8427 DOCREV CUR MEDS BY ELIG CLIN: HCPCS | Performed by: INTERNAL MEDICINE

## 2025-08-26 PROCEDURE — 1036F TOBACCO NON-USER: CPT | Performed by: INTERNAL MEDICINE

## 2025-08-28 PROBLEM — J44.9 CHRONIC OBSTRUCTIVE PULMONARY DISEASE (HCC): Status: ACTIVE | Noted: 2025-08-28

## 2025-09-05 DIAGNOSIS — D46.1 REFRACTORY ANEMIA WITH RING SIDEROBLASTS: ICD-10-CM

## 2025-09-05 DIAGNOSIS — D46.Z MDS (MYELODYSPLASTIC SYNDROME), LOW GRADE (MULTI): Primary | ICD-10-CM

## 2025-09-08 ENCOUNTER — APPOINTMENT (OUTPATIENT)
Dept: PRIMARY CARE | Facility: CLINIC | Age: 80
End: 2025-09-08
Payer: MEDICARE

## 2026-01-28 ENCOUNTER — APPOINTMENT (OUTPATIENT)
Dept: NEPHROLOGY | Facility: CLINIC | Age: 81
End: 2026-01-28
Payer: COMMERCIAL

## 2026-04-23 ENCOUNTER — APPOINTMENT (OUTPATIENT)
Dept: CARDIOLOGY | Facility: CLINIC | Age: 81
End: 2026-04-23
Payer: MEDICARE